# Patient Record
Sex: MALE | Race: BLACK OR AFRICAN AMERICAN | Employment: OTHER | URBAN - METROPOLITAN AREA
[De-identification: names, ages, dates, MRNs, and addresses within clinical notes are randomized per-mention and may not be internally consistent; named-entity substitution may affect disease eponyms.]

---

## 2017-08-19 ENCOUNTER — HOSPITAL ENCOUNTER (INPATIENT)
Age: 82
LOS: 5 days | Discharge: HOME | DRG: 247 | End: 2017-08-24
Attending: EMERGENCY MEDICINE | Admitting: INTERNAL MEDICINE
Payer: MEDICARE

## 2017-08-19 ENCOUNTER — APPOINTMENT (OUTPATIENT)
Dept: GENERAL RADIOLOGY | Age: 82
DRG: 247 | End: 2017-08-19
Attending: NURSE PRACTITIONER
Payer: MEDICARE

## 2017-08-19 DIAGNOSIS — I10 ESSENTIAL HYPERTENSION: ICD-10-CM

## 2017-08-19 DIAGNOSIS — G47.33 OSA (OBSTRUCTIVE SLEEP APNEA): ICD-10-CM

## 2017-08-19 DIAGNOSIS — I20.0 UNSTABLE ANGINA (HCC): Primary | ICD-10-CM

## 2017-08-19 LAB
ALBUMIN SERPL-MCNC: 3.9 G/DL (ref 3.5–5)
ALBUMIN/GLOB SERPL: 1 {RATIO} (ref 1.1–2.2)
ALP SERPL-CCNC: 84 U/L (ref 45–117)
ALT SERPL-CCNC: 16 U/L (ref 12–78)
ANION GAP SERPL CALC-SCNC: 5 MMOL/L (ref 5–15)
AST SERPL-CCNC: 11 U/L (ref 15–37)
ATRIAL RATE: 69 BPM
BASOPHILS # BLD: 0 K/UL (ref 0–0.1)
BASOPHILS NFR BLD: 0 % (ref 0–1)
BILIRUB SERPL-MCNC: 0.4 MG/DL (ref 0.2–1)
BUN SERPL-MCNC: 23 MG/DL (ref 6–20)
BUN/CREAT SERPL: 18 (ref 12–20)
CALCIUM SERPL-MCNC: 9.6 MG/DL (ref 8.5–10.1)
CALCULATED P AXIS, ECG09: 63 DEGREES
CALCULATED R AXIS, ECG10: 51 DEGREES
CALCULATED T AXIS, ECG11: 115 DEGREES
CHLORIDE SERPL-SCNC: 109 MMOL/L (ref 97–108)
CK SERPL-CCNC: 118 U/L (ref 39–308)
CO2 SERPL-SCNC: 28 MMOL/L (ref 21–32)
CREAT SERPL-MCNC: 1.26 MG/DL (ref 0.7–1.3)
DIAGNOSIS, 93000: NORMAL
EOSINOPHIL # BLD: 0.1 K/UL (ref 0–0.4)
EOSINOPHIL NFR BLD: 3 % (ref 0–7)
ERYTHROCYTE [DISTWIDTH] IN BLOOD BY AUTOMATED COUNT: 15.9 % (ref 11.5–14.5)
GLOBULIN SER CALC-MCNC: 4 G/DL (ref 2–4)
GLUCOSE SERPL-MCNC: 99 MG/DL (ref 65–100)
HCT VFR BLD AUTO: 40.3 % (ref 36.6–50.3)
HGB BLD-MCNC: 13.1 G/DL (ref 12.1–17)
LYMPHOCYTES # BLD: 2 K/UL (ref 0.8–3.5)
LYMPHOCYTES NFR BLD: 43 % (ref 12–49)
MCH RBC QN AUTO: 26.5 PG (ref 26–34)
MCHC RBC AUTO-ENTMCNC: 32.5 G/DL (ref 30–36.5)
MCV RBC AUTO: 81.4 FL (ref 80–99)
MONOCYTES # BLD: 0.4 K/UL (ref 0–1)
MONOCYTES NFR BLD: 9 % (ref 5–13)
NEUTS SEG # BLD: 2.1 K/UL (ref 1.8–8)
NEUTS SEG NFR BLD: 45 % (ref 32–75)
P-R INTERVAL, ECG05: 156 MS
PLATELET # BLD AUTO: 185 K/UL (ref 150–400)
POTASSIUM SERPL-SCNC: 4.1 MMOL/L (ref 3.5–5.1)
PROT SERPL-MCNC: 7.9 G/DL (ref 6.4–8.2)
Q-T INTERVAL, ECG07: 406 MS
QRS DURATION, ECG06: 82 MS
QTC CALCULATION (BEZET), ECG08: 435 MS
RBC # BLD AUTO: 4.95 M/UL (ref 4.1–5.7)
SODIUM SERPL-SCNC: 142 MMOL/L (ref 136–145)
TROPONIN I SERPL-MCNC: <0.04 NG/ML
TROPONIN I SERPL-MCNC: <0.04 NG/ML
VENTRICULAR RATE, ECG03: 69 BPM
WBC # BLD AUTO: 4.7 K/UL (ref 4.1–11.1)

## 2017-08-19 PROCEDURE — 74011250636 HC RX REV CODE- 250/636: Performed by: INTERNAL MEDICINE

## 2017-08-19 PROCEDURE — 65660000000 HC RM CCU STEPDOWN

## 2017-08-19 PROCEDURE — 99284 EMERGENCY DEPT VISIT MOD MDM: CPT

## 2017-08-19 PROCEDURE — 85025 COMPLETE CBC W/AUTO DIFF WBC: CPT | Performed by: NURSE PRACTITIONER

## 2017-08-19 PROCEDURE — 80053 COMPREHEN METABOLIC PANEL: CPT | Performed by: NURSE PRACTITIONER

## 2017-08-19 PROCEDURE — 36415 COLL VENOUS BLD VENIPUNCTURE: CPT | Performed by: NURSE PRACTITIONER

## 2017-08-19 PROCEDURE — 82550 ASSAY OF CK (CPK): CPT | Performed by: NURSE PRACTITIONER

## 2017-08-19 PROCEDURE — 84484 ASSAY OF TROPONIN QUANT: CPT | Performed by: NURSE PRACTITIONER

## 2017-08-19 PROCEDURE — 93005 ELECTROCARDIOGRAM TRACING: CPT

## 2017-08-19 PROCEDURE — 71020 XR CHEST PA LAT: CPT

## 2017-08-19 PROCEDURE — 74011250637 HC RX REV CODE- 250/637: Performed by: INTERNAL MEDICINE

## 2017-08-19 RX ORDER — SODIUM CHLORIDE 0.9 % (FLUSH) 0.9 %
5-10 SYRINGE (ML) INJECTION EVERY 8 HOURS
Status: DISCONTINUED | OUTPATIENT
Start: 2017-08-19 | End: 2017-08-24 | Stop reason: HOSPADM

## 2017-08-19 RX ORDER — LOSARTAN POTASSIUM 50 MG/1
100 TABLET ORAL DAILY
Status: CANCELLED | OUTPATIENT
Start: 2017-08-20

## 2017-08-19 RX ORDER — ACETAMINOPHEN 325 MG/1
650 TABLET ORAL
Status: DISCONTINUED | OUTPATIENT
Start: 2017-08-19 | End: 2017-08-24 | Stop reason: HOSPADM

## 2017-08-19 RX ORDER — POTASSIUM CHLORIDE 750 MG/1
20 TABLET, FILM COATED, EXTENDED RELEASE ORAL EVERY EVENING
Status: DISCONTINUED | OUTPATIENT
Start: 2017-08-19 | End: 2017-08-24 | Stop reason: HOSPADM

## 2017-08-19 RX ORDER — VITAMIN E 1000 UNIT
1000 CAPSULE ORAL DAILY
COMMUNITY
End: 2019-03-04

## 2017-08-19 RX ORDER — TRIAMCINOLONE ACETONIDE 1 MG/G
CREAM TOPICAL DAILY
Status: CANCELLED | OUTPATIENT
Start: 2017-08-20

## 2017-08-19 RX ORDER — HYDROCODONE BITARTRATE AND ACETAMINOPHEN 5; 325 MG/1; MG/1
1 TABLET ORAL
Status: CANCELLED | OUTPATIENT
Start: 2017-08-19

## 2017-08-19 RX ORDER — ERGOCALCIFEROL 1.25 MG/1
50000 CAPSULE ORAL
Status: CANCELLED | OUTPATIENT
Start: 2017-08-19

## 2017-08-19 RX ORDER — PRAVASTATIN SODIUM 20 MG/1
20 TABLET ORAL
Status: DISCONTINUED | OUTPATIENT
Start: 2017-08-19 | End: 2017-08-24 | Stop reason: HOSPADM

## 2017-08-19 RX ORDER — BICALUTAMIDE 50 MG/1
50 TABLET, FILM COATED ORAL DAILY
Status: DISCONTINUED | OUTPATIENT
Start: 2017-08-20 | End: 2017-08-24 | Stop reason: HOSPADM

## 2017-08-19 RX ORDER — METHOCARBAMOL 750 MG/1
750 TABLET, FILM COATED ORAL
Status: ON HOLD | COMMUNITY
End: 2019-05-24

## 2017-08-19 RX ORDER — NITROGLYCERIN 0.4 MG/1
0.4 TABLET SUBLINGUAL
Status: DISCONTINUED | OUTPATIENT
Start: 2017-08-19 | End: 2017-08-24 | Stop reason: HOSPADM

## 2017-08-19 RX ORDER — SODIUM CHLORIDE 0.9 % (FLUSH) 0.9 %
5-10 SYRINGE (ML) INJECTION AS NEEDED
Status: DISCONTINUED | OUTPATIENT
Start: 2017-08-19 | End: 2017-08-24 | Stop reason: HOSPADM

## 2017-08-19 RX ORDER — METFORMIN HYDROCHLORIDE 500 MG/1
500 TABLET ORAL 2 TIMES DAILY WITH MEALS
Status: DISCONTINUED | OUTPATIENT
Start: 2017-08-19 | End: 2017-08-20

## 2017-08-19 RX ORDER — LISINOPRIL 20 MG/1
40 TABLET ORAL DAILY
Status: DISCONTINUED | OUTPATIENT
Start: 2017-08-19 | End: 2017-08-24 | Stop reason: HOSPADM

## 2017-08-19 RX ORDER — EZETIMIBE 10 MG/1
10 TABLET ORAL DAILY
Status: CANCELLED | OUTPATIENT
Start: 2017-08-20

## 2017-08-19 RX ORDER — METOPROLOL TARTRATE 25 MG/1
25 TABLET, FILM COATED ORAL DAILY
Status: ON HOLD | COMMUNITY
End: 2017-08-24

## 2017-08-19 RX ORDER — LORATADINE 10 MG/1
10 TABLET ORAL
Status: DISCONTINUED | OUTPATIENT
Start: 2017-08-19 | End: 2017-08-24 | Stop reason: HOSPADM

## 2017-08-19 RX ORDER — FUROSEMIDE 40 MG/1
40 TABLET ORAL DAILY
Status: DISCONTINUED | OUTPATIENT
Start: 2017-08-19 | End: 2017-08-23

## 2017-08-19 RX ORDER — DOCUSATE SODIUM 100 MG/1
100 CAPSULE, LIQUID FILLED ORAL
COMMUNITY

## 2017-08-19 RX ORDER — METOPROLOL TARTRATE 50 MG/1
50 TABLET ORAL 2 TIMES DAILY
Status: DISCONTINUED | OUTPATIENT
Start: 2017-08-19 | End: 2017-08-21

## 2017-08-19 RX ORDER — NITROGLYCERIN 0.4 MG/1
0.4 TABLET SUBLINGUAL
Status: CANCELLED | OUTPATIENT
Start: 2017-08-19

## 2017-08-19 RX ORDER — AMLODIPINE BESYLATE 5 MG/1
10 TABLET ORAL DAILY
Status: DISCONTINUED | OUTPATIENT
Start: 2017-08-19 | End: 2017-08-24 | Stop reason: HOSPADM

## 2017-08-19 RX ORDER — METHOCARBAMOL 750 MG/1
750 TABLET, FILM COATED ORAL
Status: DISCONTINUED | OUTPATIENT
Start: 2017-08-19 | End: 2017-08-24 | Stop reason: HOSPADM

## 2017-08-19 RX ORDER — HYDROCODONE BITARTRATE AND ACETAMINOPHEN 5; 325 MG/1; MG/1
1 TABLET ORAL
COMMUNITY
End: 2017-08-19

## 2017-08-19 RX ORDER — ASCORBIC ACID 500 MG
1000 TABLET ORAL DAILY
Status: DISCONTINUED | OUTPATIENT
Start: 2017-08-20 | End: 2017-08-24 | Stop reason: HOSPADM

## 2017-08-19 RX ORDER — TRIAMCINOLONE ACETONIDE 1 MG/G
CREAM TOPICAL 2 TIMES DAILY
Status: DISCONTINUED | OUTPATIENT
Start: 2017-08-19 | End: 2017-08-24 | Stop reason: HOSPADM

## 2017-08-19 RX ORDER — DOCUSATE SODIUM 100 MG/1
100 CAPSULE, LIQUID FILLED ORAL
Status: DISCONTINUED | OUTPATIENT
Start: 2017-08-19 | End: 2017-08-24 | Stop reason: HOSPADM

## 2017-08-19 RX ORDER — ENOXAPARIN SODIUM 100 MG/ML
40 INJECTION SUBCUTANEOUS EVERY 24 HOURS
Status: DISCONTINUED | OUTPATIENT
Start: 2017-08-19 | End: 2017-08-21

## 2017-08-19 RX ORDER — ASPIRIN 81 MG/1
81 TABLET ORAL DAILY
Status: DISCONTINUED | OUTPATIENT
Start: 2017-08-20 | End: 2017-08-24 | Stop reason: HOSPADM

## 2017-08-19 RX ORDER — FLUTICASONE PROPIONATE 50 MCG
2 SPRAY, SUSPENSION (ML) NASAL
COMMUNITY
End: 2019-03-04 | Stop reason: ALTCHOICE

## 2017-08-19 RX ADMIN — Medication 10 ML: at 21:27

## 2017-08-19 RX ADMIN — ENOXAPARIN SODIUM 40 MG: 40 INJECTION SUBCUTANEOUS at 17:11

## 2017-08-19 RX ADMIN — PRAVASTATIN SODIUM 20 MG: 20 TABLET ORAL at 21:25

## 2017-08-19 RX ADMIN — METOPROLOL TARTRATE 50 MG: 50 TABLET ORAL at 15:49

## 2017-08-19 RX ADMIN — METHOCARBAMOL 750 MG: 750 TABLET ORAL at 21:25

## 2017-08-19 RX ADMIN — METFORMIN HYDROCHLORIDE 500 MG: 500 TABLET, FILM COATED ORAL at 17:12

## 2017-08-19 RX ADMIN — ACETAMINOPHEN 650 MG: 325 TABLET, FILM COATED ORAL at 17:11

## 2017-08-19 RX ADMIN — LISINOPRIL 40 MG: 20 TABLET ORAL at 15:49

## 2017-08-19 RX ADMIN — DOCUSATE SODIUM 100 MG: 100 CAPSULE, LIQUID FILLED ORAL at 21:25

## 2017-08-19 RX ADMIN — AMLODIPINE BESYLATE 10 MG: 5 TABLET ORAL at 15:48

## 2017-08-19 RX ADMIN — NITROGLYCERIN 1 INCH: 20 OINTMENT TOPICAL at 15:29

## 2017-08-19 RX ADMIN — POTASSIUM CHLORIDE 20 MEQ: 750 TABLET, FILM COATED, EXTENDED RELEASE ORAL at 17:12

## 2017-08-19 RX ADMIN — FUROSEMIDE 40 MG: 40 TABLET ORAL at 15:49

## 2017-08-19 RX ADMIN — Medication 10 ML: at 18:22

## 2017-08-19 NOTE — PROGRESS NOTES
Problem: Falls - Risk of  Goal: *Absence of Falls  Document J Luis Fall Risk and appropriate interventions in the flowsheet.    Outcome: Progressing Towards Goal  Fall Risk Interventions:     Call bell within reach  Bed in lowest position

## 2017-08-19 NOTE — ED NOTES
ED EKG interpretation:  Rhythm: normal sinus rhythm; and regular . Rate (approx.): 69; ST/T wave: T wave inverted in V5 and V6; No changes from June 2015.   Note written by Neris Mejia, as dictated by Price Day MD 11:19 AM    Alma Pandya NP  3:21 PM

## 2017-08-19 NOTE — PROGRESS NOTES
TRANSFER - IN REPORT:    Verbal report [facility] from [facility]name[facility]  being received from ED(unit) for routine progression of care      Report consisted of patients Situation, Background, Assessment and   Recommendations(SBAR). Information from the following report(s) SBAR, Kardex, [facility] and Recent Results was reviewed with the receiving nurse. Opportunity for questions and clarification was provided. Assessment completed upon patients arrival to unit and care assumed.

## 2017-08-19 NOTE — ED TRIAGE NOTES
Pt states that at 2 am this morning he woke with a sharp 10/10 midsternal chest pain which lasts about a minute at a time. Pt states that the pain is increased with inspiration and says he nauseated with a headache. Denies SOB and has been pain free.  Pt states that he took 324 mg of ASA at 9 am.

## 2017-08-19 NOTE — PROGRESS NOTES
Prior to Admission Medications   Prescriptions Last Dose Informant Patient Reported? Taking? HYDROcodone-acetaminophen (NORCO) 5-325 mg per tablet Not Taking at Unknown time  Yes No   Sig: Take 1 Tab by mouth every six (6) hours as needed for Pain. amLODIPine (NORVASC) 10 mg tablet 8/18/2017 at 0900  Yes Yes   Sig: Take 10 mg by mouth daily. ascorbic acid, vitamin C, (VITAMIN C) 1,000 mg tablet 8/18/2017 at 0900  Yes Yes   Sig: Take 1,000 mg by mouth daily. aspirin delayed-release 81 mg tablet 8/19/2017 at 0800  No Yes   Sig: Take 1 Tab by mouth daily. Patient taking differently: Take 81 mg by mouth two (2) times a day. betamethasone dipropionate (DIPROSONE) 0.05 % topical cream 8/12/2017 at Unknown time  Yes Yes   Sig: Apply  to affected area two (2) times a day. bicalutamide (CASODEX) 50 mg tablet 8/18/2017 at 0900  Yes Yes   Sig: Take 50 mg by mouth daily. docusate sodium (DULCOLAX STOOL SOFTENER, DSS,) 100 mg capsule 8/18/2017 at 2100  No Yes   Sig: Take 1 Cap by mouth two (2) times a day. Patient taking differently: Take 100 mg by mouth nightly.   ergocalciferol (VITAMIN D2) 50,000 unit capsule Not Taking at Unknown time  Yes No   Sig: Take 50,000 Units by mouth every thirty (30) days. ezetimibe (ZETIA) 10 mg tablet 8/18/2017 at 0900  Yes Yes   Sig: Take 10 mg by mouth daily. furosemide (LASIX) 40 mg tablet 8/18/2017 at 0900  Yes Yes   Sig: Take 40 mg by mouth daily. lisinopril (PRINIVIL, ZESTRIL) 40 mg tablet 8/18/2017 at 0900  Yes Yes   Sig: Take 40 mg by mouth daily. loratadine (CLARITIN) 10 mg tablet 8/18/2017 at 0900  Yes Yes   Sig: Take 10 mg by mouth nightly as needed for Allergies. losartan (COZAAR) 100 mg tablet 8/18/2017 at 2100  Yes Yes   Sig: Take 100 mg by mouth daily. metFORMIN (GLUCOPHAGE) 500 mg tablet 8/18/2017 at 1800  Yes Yes   Sig: Take 500 mg by mouth two (2) times daily (with meals).    methocarbamol (ROBAXIN) 750 mg tablet 8/18/2017 at 2100  Yes Yes   Sig: Take 750 mg by mouth nightly. metoprolol tartrate (LOPRESSOR) 25 mg tablet 8/18/2017 at 0900  Yes Yes   Sig: Take 25 mg by mouth daily. nitroglycerin (NITROSTAT) 0.4 mg SL tablet Not Taking at Unknown time  Yes No   Sig: by SubLINGual route every five (5) minutes as needed for Chest Pain (ntg 0.4 q 5 min. x 3 doses PRN chest pain  call MD or 911 if no relief). potassium chloride (K-DUR, KLOR-CON) 20 mEq tablet 8/18/2017 at 2100  Yes Yes   Sig: Take 20 mEq by mouth every evening. pravastatin (PRAVACHOL) 20 mg tablet 8/18/2017 at 2100  Yes Yes   Sig: Take 20 mg by mouth nightly. triamcinolone acetonide (KENALOG) 0.1 % topical cream 8/12/2017 at Unknown time  Yes Yes   Sig: Apply  to affected area daily. use thin layer      Facility-Administered Medications: None   Self:List updated per patient interview and some bottles brought in.  Awaiting wife to verify lorsartan and zetia

## 2017-08-19 NOTE — PROGRESS NOTES
Admission Medication Reconciliation:    Information obtained from: patient    Significant PMH/Disease States:   Past Medical History:   Diagnosis Date    Cancer [facility])     Prostate CA    [facility]     Hypertension     Myocardial infarct ([facility]     Sleep disorder     Sleep Apnea C-PAP use at home        Chief Complaint for this Admission:  No chief complaint on file. Allergies:  Iodine    Prior to Admission Medications:   Prior to Admission Medications   Prescriptions Last Dose Informant Patient Reported? Taking? VITAMIN E/QUININE SULFATE (LEG CRAMP TREATMENT PO) 2017  Yes Yes   Sig: Take 1 Cap by mouth nightly. Filiberto's leg cramps   amLODIPine (NORVASC) 10 mg tablet 2017 at 0900  Yes Yes   Sig: Take 10 mg by mouth daily. ascorbic acid, vitamin C, (VITAMIN C) 1,000 mg tablet 2017 at 0900  Yes Yes   Sig: Take 1,000 mg by mouth daily. aspirin delayed-release 81 mg tablet 2017 at 0800  No Yes   Sig: Take 1 Tab by mouth daily. betamethasone dipropionate (DIPROSONE) 0.05 % topical cream 2017  Yes Yes   Sig: Apply  to affected area two (2) times a day. bicalutamide (CASODEX) 50 mg tablet 2017 at 0900  Yes Yes   Sig: Take 50 mg by mouth daily. docusate sodium (COLACE) 100 mg capsule 2017  Yes Yes   Sig: Take 100 mg by mouth nightly as needed for Constipation. fluticasone (FLONASE) 50 mcg/actuation nasal spray 2017  Yes Yes   Si Sprays by Both Nostrils route nightly. furosemide (LASIX) 40 mg tablet 2017 at 0900  Yes Yes   Sig: Take 40 mg by mouth daily. lisinopril (PRINIVIL, ZESTRIL) 40 mg tablet 2017 at 0900  Yes Yes   Sig: Take 40 mg by mouth daily. loratadine (CLARITIN) 10 mg tablet 2017 at 0900  Yes Yes   Sig: Take 10 mg by mouth nightly as needed for Allergies. metFORMIN (GLUCOPHAGE) 500 mg tablet 2017 at 1800  Yes Yes   Sig: Take 500 mg by mouth two (2) times daily (with meals).    methocarbamol (ROBAXIN) 750 mg tablet 8/18/2017 at 2100  Yes Yes   Sig: Take 750 mg by mouth nightly. metoprolol tartrate (LOPRESSOR) 25 mg tablet 8/18/2017 at 0900  Yes Yes   Sig: Take 25 mg by mouth daily. potassium chloride (K-DUR, KLOR-CON) 20 mEq tablet 8/18/2017 at 2100  Yes Yes   Sig: Take 20 mEq by mouth every evening. pravastatin (PRAVACHOL) 20 mg tablet 8/18/2017 at 2100  Yes Yes   Sig: Take 20 mg by mouth nightly. Facility-Administered Medications: None         Comments/Recommendations: This medication history was obtained from patient and [address] [place] [address]  ([phone]); (s)he appears to be a good historian. An RX Query is available. Medications added: david's leg cramps, flonase  Medications deleted: vitD, zetia, losartan, nitrostat, triamcinolone  Medications amended: colace is nightly    Last doses of medication: yesterday. Patient only took 4 x aspirin this am  Review of Allergies: completed    Also of note: patient says he does not take the casodex and furosemide on sundays (reason is because he doesn't want to have to urinate during Taoism)    Thank you for allowing me to participate in the care of this patient. Please contact the pharmacy () or the medication reconciliation pharmacy () with any questions.     Chepe Dyer, MendezD

## 2017-08-19 NOTE — ED PROVIDER NOTES
HPI Comments: The pt is an 80year old male who presents with complaints of intermittent chest pain since 0200. Episodes last one minute and are exacerbated with deep breathing. Pt denies fevers, chills, night sweats, SOB, VALLADARES, PND, orthopnea, abdominal pain, n/v/d, melena, hematuria, dysuria, constipation, HA, dizziness, and syncope. He did take 325 mg of ASA this am.     Past Medical History:  No date: Cancer ([facility]      Comment: Prostate CA  No date: Diabetes ([facility]  No date: Hypertension  No date: Myocardial infarct ([facility]  No date: Sleep disorder      Comment: Sleep Apnea C-PAP use at home     Past Surgical History:  No date: CARDIAC SURG PROCEDURE UNLIST      Comment: CABG  2012: HX COLECTOMY  No date: HX CORONARY STENT PLACEMENT  No date: HX PROSTATECTOMY    PCP:  Nafisa Christiansen MD        Patient is a 80 y.o. male presenting with chest pain. The history is provided by the patient. Chest Pain (Angina)    This is a new problem. Episode onset: eight hours  The problem has not changed since onset. Duration of episode(s) is 1 minute. The problem occurs hourly. The pain is associated with normal activity. The pain is present in the substernal region. The quality of the pain is described as sharp. The pain does not radiate. Associated symptoms include headaches and nausea. Pertinent negatives include no abdominal pain, no back pain, no claudication, no cough, no diaphoresis, no dizziness, no exertional chest pressure, no fever, no hemoptysis, no irregular heartbeat, no leg pain, no lower extremity edema, no malaise/fatigue, no near-syncope, no numbness, no orthopnea, no palpitations, no PND, no shortness of breath, no sputum production, no vomiting and no weakness. He has tried aspirin for the symptoms. The treatment provided no relief. Risk factors include diabetes mellitus, obesity, a sendentary lifestyle, hypertension, male gender and cardiac disease.  Procedural history includes cardiac catheterization, stress echo and CABG. Past workup comments: most recent stress test in 2015. Past Medical History:   Diagnosis Date    Cancer [facility])     Prostate CA    [facility]     Hypertension     Myocardial infarct ([facility]     Sleep disorder     Sleep Apnea C-PAP use at home        Past Surgical History:   Procedure Laterality Date    CARDIAC SURG PROCEDURE UNLIST      CABG    HX COLECTOMY  2012    HX CORONARY STENT PLACEMENT      HX PROSTATECTOMY           History reviewed. No pertinent family history. Social History     Social History    Marital status: SINGLE     Spouse name: N/A    Number of children: N/A    Years of education: N/A     Occupational History    Not on file. Social History Main Topics    Smoking status: Never Smoker    Smokeless tobacco: Never Used    Alcohol use No    Drug use: Yes    Sexual activity: Not on file     Other Topics Concern    Not on file     Social History Narrative         ALLERGIES: Iodine    Review of Systems   Constitutional: Negative for activity change, appetite change, chills, diaphoresis, fatigue, fever, malaise/fatigue and unexpected weight change. HENT: Negative for congestion, ear pain, rhinorrhea, sinus pressure, sore throat, tinnitus, trouble swallowing and voice change. Eyes: Negative for pain, discharge, redness and visual disturbance. Respiratory: Negative for apnea, cough, hemoptysis, sputum production, choking, chest tightness, shortness of breath, wheezing and stridor. Cardiovascular: Positive for chest pain. Negative for palpitations, orthopnea, claudication, leg swelling, PND and near-syncope. Gastrointestinal: Positive for nausea. Negative for abdominal pain, constipation and vomiting. Endocrine: Negative for cold intolerance and heat intolerance. Genitourinary: Negative for difficulty urinating, dysuria, flank pain, hematuria, testicular pain and urgency.    Musculoskeletal: Negative for arthralgias, back pain, gait problem, joint swelling, myalgias, neck pain and neck stiffness. Skin: Negative for color change, pallor, rash and wound. Allergic/Immunologic: Negative for immunocompromised state. Neurological: Positive for headaches. Negative for dizziness, tremors, syncope, weakness, light-headedness and numbness. Hematological: Does not bruise/bleed easily. Psychiatric/Behavioral: Negative for agitation, confusion and suicidal ideas. Vitals:    08/19/17 1006   BP: (!) 183/91   Pulse: 71   Resp: 17   SpO2: 95%   Weight: 125.2 kg (276 lb)   Height: 5' 6\" (1.676 m)            Physical Exam   Constitutional: He is oriented to person, place, and time. He appears well-developed and well-nourished. No distress. HENT:   Head: Atraumatic. Nose: Nose normal.   Mouth/Throat: No oropharyngeal exudate. Eyes: Conjunctivae and EOM are normal. Right eye exhibits no discharge. Left eye exhibits no discharge. No scleral icterus. Neck: Normal range of motion. Neck supple. No JVD present. No tracheal deviation present. No thyromegaly present. Cardiovascular: Normal rate, regular rhythm, normal heart sounds, intact distal pulses and normal pulses. PMI is not displaced. Exam reveals no gallop and no friction rub. No murmur heard. Pulmonary/Chest: Breath sounds normal. No accessory muscle usage or stridor. No respiratory distress. He has no decreased breath sounds. He has no wheezes. He has no rhonchi. He has no rales. He exhibits no tenderness. Abdominal: Soft. Bowel sounds are normal. He exhibits no distension and no mass. There is no hepatosplenomegaly. There is no tenderness. There is no rigidity, no rebound, no guarding, no CVA tenderness, no tenderness at McBurney's point and negative Ferguson's sign. Musculoskeletal: Normal range of motion. He exhibits no edema or tenderness. Lymphadenopathy:     He has no cervical adenopathy. Neurological: He is alert and oriented to person, place, and time. He has normal strength.  No cranial nerve deficit or sensory deficit. GCS eye subscore is 4. GCS verbal subscore is 5. GCS motor subscore is 6. Skin: Skin is warm and dry. He is not diaphoretic. Psychiatric: He has a normal mood and affect. His behavior is normal.   Nursing note and vitals reviewed. MDM  Number of Diagnoses or Management Options  Diagnosis management comments:    * routine laboratory data    * EKG and CXR    * Consult to Cards        Amount and/or Complexity of Data Reviewed  Clinical lab tests: ordered and reviewed  Tests in the radiology section of CPT®: ordered and reviewed  Discussion of test results with the performing providers: yes  Review and summarize past medical records: yes  Discuss the patient with other providers: yes    Risk of Complications, Morbidity, and/or Mortality  General comments:    - hemodynamically stable pt in NAD    Patient Progress  Patient progress: stable    ED Course       Procedures         CONSULT NOTE:   3:20 PM  Rebeka Tyler NP spoke with Dr. Kasia Villaseñor MD,   Specialty: Cardiology  Discussed pt's hx, disposition, and available diagnostic and imaging results. Reviewed care plans. Consultant agrees with plans as outlined. Admit to inpatient. Rebeka Tyler NP      3:20 PM  Patient is being admitted to the hospital.  The results of their tests and reasons for their admission have been discussed with them and/or available family. They convey agreement and understanding for the need to be admitted and for their admission diagnosis. Consultation has been made with the inpatient physician specialist for hospitalization.     LABORATORY TESTS:  Recent Results (from the past 12 hour(s))   EKG, 12 LEAD, INITIAL    Collection Time: 08/19/17 10:05 AM   Result Value Ref Range    Ventricular Rate 69 BPM    Atrial Rate 69 BPM    P-R Interval 156 ms    QRS Duration 82 ms    Q-T Interval 406 ms    QTC Calculation (Bezet) 435 ms    Calculated P Axis 63 degrees    Calculated R Axis 51 degrees    Calculated T Axis 115 degrees    Diagnosis       Normal sinus rhythm  T wave abnormality, consider anterolateral ischemia  When compared with ECG of 14-JUL-2015 05:11,  No significant change was found     CBC WITH AUTOMATED DIFF    Collection Time: 08/19/17 10:15 AM   Result Value Ref Range    WBC 4.7 4.1 - 11.1 K/uL    RBC 4.95 4.10 - 5.70 M/uL    HGB 13.1 12.1 - 17.0 g/dL    HCT 40.3 36.6 - 50.3 %    MCV 81.4 80.0 - 99.0 FL    MCH 26.5 26.0 - 34.0 PG    MCHC 32.5 30.0 - 36.5 g/dL    RDW 15.9 (H) 11.5 - 14.5 %    PLATELET 230 567 - 571 K/uL    NEUTROPHILS 45 32 - 75 %    LYMPHOCYTES 43 12 - 49 %    MONOCYTES 9 5 - 13 %    EOSINOPHILS 3 0 - 7 %    BASOPHILS 0 0 - 1 %    ABS. NEUTROPHILS 2.1 1.8 - 8.0 K/UL    ABS. LYMPHOCYTES 2.0 0.8 - 3.5 K/UL    ABS. MONOCYTES 0.4 0.0 - 1.0 K/UL    ABS. EOSINOPHILS 0.1 0.0 - 0.4 K/UL    ABS. BASOPHILS 0.0 0.0 - 0.1 K/UL   METABOLIC PANEL, COMPREHENSIVE    Collection Time: 08/19/17 10:15 AM   Result Value Ref Range    Sodium 142 136 - 145 mmol/L    Potassium 4.1 3.5 - 5.1 mmol/L    Chloride 109 (H) 97 - 108 mmol/L    CO2 28 21 - 32 mmol/L    Anion gap 5 5 - 15 mmol/L    Glucose 99 65 - 100 mg/dL    BUN 23 (H) 6 - 20 MG/DL    Creatinine 1.26 0.70 - 1.30 MG/DL    BUN/Creatinine ratio 18 12 - 20      GFR est AA >60 >60 ml/min/1.73m2    GFR est non-AA 54 (L) >60 ml/min/1.73m2    Calcium 9.6 8.5 - 10.1 MG/DL    Bilirubin, total 0.4 0.2 - 1.0 MG/DL    ALT (SGPT) 16 12 - 78 U/L    AST (SGOT) 11 (L) 15 - 37 U/L    Alk.  phosphatase 84 45 - 117 U/L    Protein, total 7.9 6.4 - 8.2 g/dL    Albumin 3.9 3.5 - 5.0 g/dL    Globulin 4.0 2.0 - 4.0 g/dL    A-G Ratio 1.0 (L) 1.1 - 2.2     CK W/ REFLX CKMB    Collection Time: 08/19/17 10:15 AM   Result Value Ref Range     39 - 308 U/L   TROPONIN I    Collection Time: 08/19/17 10:15 AM   Result Value Ref Range    Troponin-I, Qt. <0.04 <0.05 ng/mL   TROPONIN I    Collection Time: 08/19/17 12:35 PM   Result Value Ref Range    Troponin-I, Qt. <0.04 <0.05 ng/mL       IMAGING RESULTS:  XR CHEST PA LAT   Final Result        Xr Chest Pa Lat    Result Date: 8/19/2017  INDICATION:   chest pain  midsternal, 10 out of 10, increases with inspiration, nausea and headache COMPARISON: 7/13/15 FINDINGS: Frontal and lateral views of the chest demonstrate cardiomegaly and prior median sternotomy. The lungs are bibasilar atelectasis or scarring. There is no edema, effusion, consolidation, or pneumothorax. The osseous structures are unremarkable. IMPRESSION: No acute process. MEDICATIONS GIVEN:  Medications   amLODIPine (NORVASC) tablet 10 mg (not administered)   furosemide (LASIX) tablet 40 mg (not administered)   lisinopril (PRINIVIL, ZESTRIL) tablet 40 mg (not administered)   nitroglycerin (NITROBID) 2 % ointment 1 Inch (not administered)   metoprolol tartrate (LOPRESSOR) tablet 50 mg (not administered)       IMPRESSION:  1. Unstable angina ([address]    2. Essential hypertension    3. NAOMIE (obstructive sleep apnea)        PLAN:  1.  Admit to Cardiology       Lilia Sims NP  3:21 PM

## 2017-08-19 NOTE — ROUTINE PROCESS
TRANSFER - OUT REPORT:    Verbal report given to MIRANDA Norton(name) on Mata Chacon  being transferred to [facility]) for routine progression of care       Report consisted of patients Situation, Background, Assessment and   Recommendations(SBAR). Information from the following report(s) SBAR and Kardex was reviewed with the receiving nurse. Lines:   Peripheral IV 08/19/17 Left Antecubital (Active)   Site Assessment Clean, dry, & intact 8/19/2017 10:19 AM   Phlebitis Assessment 0 8/19/2017 10:19 AM   Infiltration Assessment 0 8/19/2017 10:19 AM   Dressing Status Clean, dry, & intact 8/19/2017 10:19 AM   Dressing Type Tape;Transparent 8/19/2017 10:19 AM   Hub Color/Line Status Pink;Flushed;Patent 8/19/2017 10:19 AM   Action Taken Blood drawn 8/19/2017 10:19 AM        Opportunity for questions and clarification was provided.       Patient transported with:   [facility]

## 2017-08-19 NOTE — IP AVS SNAPSHOT
[address] 
[phone] Patient: Dez Parker MRN: [id number] VPK:[id number] You are allergic to the following Allergen Reactions Iodine Other (comments) \"it gives me a headache\" Recent Documentation Height Weight BMI Smoking Status 1.676 m 122.4 kg 43.55 kg/m2 Never Smoker Emergency Contacts Name Discharge Info Relation Home Work Mobile Janelle Gonsales  Child [2] [phone] About your hospitalization You were admitted on:  August 19, 2017 You last received care in the:  [facility] 4 IMCU 2 You were discharged on:  August 24, 2017 Unit phone number:  [phone] Why you were hospitalized Your primary diagnosis was:  Not on File Your diagnoses also included:  Unstable Angina (Hcc) Providers Seen During Your Hospitalizations Provider Role Specialty Primary office phone Stanford Willard MD Attending Provider Emergency Medicine [phone] Peter Flowers MD Attending Provider Cardiology [phone] Dheeraj Clemons MD Attending Provider Cardiology [phone] Your Primary Care Physician (PCP) Primary Care Physician Office Phone Office Fax [place], [address] [place] [address] [phone] Follow-up Information Follow up With Details Comments Contact Info Additional Information [facility] CARDIAC WELLNESS  appointmennt for Monday 9/11/2017 at 1:00pm. bring medication list and insurannce cards. this is a two hour appointment . wear comfortable clothing [phone] [facility] 54 
[phone] Please arrive 15 minutes prior to your appointment time and you will register in the [facility] 100, [address], on the first floor of the [address] [place] [address] Road. Telephone: [phone] Fax: [phone] Driving directions To [facility] and [facility] [place].  Building: Driving WEST on [address], take exit [address] to [place]. Turn left onto [facility], then turn right into [employer] Parking lot Driving EAST on [address], take [address] [facility]. Turn right at the end of the exit ramp. Turn left onto [facility], then turn right into [employer] lot. João Sosa MD  As needed [address] [address] 
[phone] Herrera Mendes MD On 9/13/2017 9/13/17 at 2pm for hospital follow up [address] [place] [address] [address] 
[phone] Your Appointments Monday September 11, 2017  1:00 PM EDT INTAKE with Kwabena Holley RN  
[facility] CARDIAC WELLNESS ([address]) [address]  
[phone] Please arrive 15 minutes prior to your appointment time and you will register in the [facility] 100, [address], on the first floor of the [address] [place] [address] Road. Telephone: [phone] Fax: [phone] Driving directions To [facility] and [facility] [place]. Building: Driving WEST on [address], take exit [address] to [place]. Turn left onto [facility], then turn right into [employer] Parking lot Driving EAST on [address], take exit [address] [facility]. Turn right at the end of the exit ramp. Turn left onto [facility], then turn right into [employer] lot. Current Discharge Medication List  
  
START taking these medications Dose & Instructions Dispensing Information Comments Morning Noon Evening Bedtime  
 clopidogrel 75 mg Tab Commonly known as:  PLAVIX Your last dose was: Your next dose is:    
   
   
 Dose:  75 mg Take 1 Tab by mouth daily. Indications: Thrombosis Prevention after PCI Quantity:  90 Tab Refills:  3 CONTINUE these medications which have CHANGED Dose & Instructions Dispensing Information Comments Morning Noon Evening Bedtime  
 metoprolol tartrate 25 mg tablet Commonly known as:  LOPRESSOR What changed:  how much to take Your last dose was: Your next dose is:    
   
   
 Dose:  12.5 mg Take 0.5 Tabs by mouth daily. Quantity:  30 Tab Refills:  3 CONTINUE these medications which have NOT CHANGED Dose & Instructions Dispensing Information Comments Morning Noon Evening Bedtime  
 amLODIPine 10 mg tablet Commonly known as:  Schuyler Cordial Your last dose was: Your next dose is:    
   
   
 Dose:  10 mg Take 10 mg by mouth daily. Refills:  0  
     
   
   
   
  
 aspirin delayed-release 81 mg tablet Your last dose was: Your next dose is:    
   
   
 Dose:  81 mg Take 1 Tab by mouth daily. Quantity:  100 Tab Refills:  4  
     
   
   
   
  
 betamethasone dipropionate 0.05 % topical cream  
Commonly known as:  Alroy Carbon Your last dose was: Your next dose is:    
   
   
 Apply  to affected area two (2) times a day. Refills:  0  
     
   
   
   
  
 bicalutamide 50 mg tablet Commonly known as:  CASODEX Your last dose was: Your next dose is:    
   
   
 Dose:  50 mg Take 50 mg by mouth daily. Refills:  0  
     
   
   
   
  
 COLACE 100 mg capsule Generic drug:  docusate sodium Your last dose was: Your next dose is:    
   
   
 Dose:  100 mg Take 100 mg by mouth nightly as needed for Constipation. Refills:  0  
     
   
   
   
  
 FLONASE 50 mcg/actuation nasal spray Generic drug:  fluticasone Your last dose was: Your next dose is:    
   
   
 Dose:  2 Spray 2 Sprays by Both Nostrils route nightly. Refills:  0 LEG CRAMP TREATMENT PO Your last dose was: Your next dose is:    
   
   
 Dose:  1 Cap Take 1 Cap by mouth nightly. Filiberto's leg cramps Refills:  0  
     
   
   
   
  
 lisinopril 40 mg tablet Commonly known as:  Willem Vlales Your last dose was: Your next dose is:    
   
   
 Dose:  40 mg Take 40 mg by mouth daily. Refills:  0  
     
   
   
   
  
 loratadine 10 mg tablet Commonly known as:  Deann Nett Your last dose was: Your next dose is:    
   
   
 Dose:  10 mg Take 10 mg by mouth nightly as needed for Allergies. Refills:  0  
     
   
   
   
  
 methocarbamol 750 mg tablet Commonly known as:  ROBAXIN Your last dose was: Your next dose is:    
   
   
 Dose:  750 mg Take 750 mg by mouth nightly. Refills:  0  
     
   
   
   
  
 pravastatin 20 mg tablet Commonly known as:  PRAVACHOL Your last dose was: Your next dose is:    
   
   
 Dose:  20 mg Take 20 mg by mouth nightly. Refills:  0  
     
   
   
   
  
 VITAMIN C 1,000 mg tablet Generic drug:  ascorbic acid (vitamin C) Your last dose was: Your next dose is:    
   
   
 Dose:  1000 mg Take 1,000 mg by mouth daily. Refills:  0 STOP taking these medications   
 furosemide 40 mg tablet Commonly known as:  LASIX  
   
  
 metFORMIN 500 mg tablet Commonly known as:  GLUCOPHAGE potassium chloride 20 mEq tablet Commonly known as:  K-DUR, KLOR-CON Where to Get Your Medications Information on where to get these meds will be given to you by the nurse or doctor. ! Ask your nurse or doctor about these medications  
  clopidogrel 75 mg Tab  
 metoprolol tartrate 25 mg tablet Discharge Instructions Patient Discharge Instructions Jair Reynolds / [id number] : 10/30/1930 Admitted 2017 Discharged: 2017 Take Home Medications · It is important that you take the medication exactly as they are prescribed. · Keep your medication in the bottles provided by the pharmacist and keep a list of the medication names, dosages, and times to be taken in your wallet. · Do not take other medications without consulting your doctor. · You must take aspirin and clopidogrel every day to keep your new stent from clotting · Do not take metformin until you see your PCP since you had a problem with your kidneys. You may need to take a different medication for your diabetes BRING ALL OF YOUR MEDICINES TO YOUR OFFICE VISIT with Dr. Tierra Carlos. Follow-up with Carolyn Martinez MD in 3 weeks. Call [phone] to arrange. Cardiac Catheterization  Discharge Instructions ? Do not drive, operate any machinery, or sign any legal documents for 24 hours after your procedure. You must have someone to drive you home. ? You may take a shower 24 hours after your cardiac catheterization. Be sure to get the dressing wet and then remove it; gently wash the area with warm soapy water. Pat dry and leave open to air. To help prevent infections, be sure to keep the cath site clean and dry. No lotions, creams, powders, ointments, etc. in the cath site for approximately 1 week. ? Do not take a tub bath, get in a hot tub or swimming pool for approximately 5 days or until the cath site is completely healed. ? No strenuous activity or heavy lifting over 10 lbs. for 7 days. ? Drink plenty of fluids for 24-48 hours after your cath to flush the contrast dye from your kidneys. No alcoholic beverages for 24 hours. You may resume your previous diet (low fat, low cholesterol) after your cath. ? After your cath, some bruising or discomfort is common during the healing process. Tylenol, 1-2 tablets every 6 hours as needed, is recommended if you experience any discomfort.   If you experience any signs or symptoms of infection such as fever, chills, or poorly healing incision, persistent tenderness or swelling in the groin, redness and/or warmth to the touch, numbness, significant tingling or pain at the groin site or affected extremity, rash, drainage from the cath site, or if the leg feels tight or swollen, call your physician right away. ? If bleeding at the cath site occurs, take a clean gauze pad and apply direct pressure to the groin just above the puncture site. Call 911 immediately, and continue to apply direct pressure until an ambulance gets to your location. ? You may return to work  2  days after your cardiac cath if no groin bleeding. Information obtained by : 
I understand that if any problems occur once I am at home I am to contact my physician. I understand and acknowledge receipt of the instructions indicated above. R.N.'s Signature                                                                  Date/Time Patient or Representative Signature                                                          Date/Time Jakub Fowler MD 
 
Discharge Orders None [employer] Announcement We are excited to announce that we are making your provider's discharge notes available to you in [employer]. You will see these notes when they are completed and signed by the physician that discharged you from your recent hospital stay. If you have any questions or concerns about any information you see in [employer], please call the Health Information Department where you were seen or reach out to your Primary Care Provider for more information about your plan of care. Introducing [facility]!    
 [employer] introduces [employer] patient portal. Now you can access parts of your medical record, email your doctor's office, and request medication refills online. 1. In your internet browser, go to [email or web address] 2. Click on the First Time User? Click Here link in the Sign In box. You will see the New Member Sign Up page. 3. Enter your [employer] Access Code exactly as it appears below. You will not need to use this code after youve completed the sign-up process. If you do not sign up before the expiration date, you must request a new code. · [employer] Access Code: [email or web address] Expires: 11/19/2017  7:41 AM 
 
4. Enter the last four digits of your Social Security Number (xxxx) and Date of Birth (mm/dd/yyyy) as indicated and click Submit. You will be taken to the next sign-up page. 5. Create a [employer] ID. This will be your [employer] login ID and cannot be changed, so think of one that is secure and easy to remember. 6. Create a [employer] password. You can change your password at any time. 7. Enter your Password Reset Question and Answer. This can be used at a later time if you forget your password. 8. Enter your e-mail address. You will receive e-mail notification when new information is available in [address] 9. Click Sign Up. You can now view and download portions of your medical record. 10. Click the Download Summary menu link to download a portable copy of your medical information. If you have questions, please visit the Frequently Asked Questions section of the [employer] website. Remember, [employer] is NOT to be used for urgent needs. For medical emergencies, dial 911. Now available from your iPhone and Android! General Information Please provide this summary of care documentation to your next provider. Patient Signature:  ____________________________________________________________ Date:  ____________________________________________________________  
  
Gwendlo Finger  Provider Signature: ____________________________________________________________ Date:  ____________________________________________________________

## 2017-08-19 NOTE — H&P
[address]   [address] [place] [address]   HISTORY AND PHYSICAL       Name:  Tran Jarrell   MR#:  [id number]   :  10/30/1930   Account #:  [de-identified]        Date of Adm:  2017       HISTORY OF PRESENT ILLNESS: This 51-year-old man is admitted   with recurring episodes of substernal chest discomfort, some of it is   pleuritic, some of it is short lasting, but there is a persistent sense of   chest tightness. The symptoms began around 2:30 this morning. He   was last admitted in 2015 by Dr. Rhoda Gee. He has a history of   coronary bypass grafting in . Shortly after his bypass, within the   first year, he had failure of his LIMA graft, and had native LAD stenting. He had done well since that time. He had had repeat catheterizations,   and at his last admission in 2015, a nuclear medicine scan   showed no evidence of myocardium at risk, with normal wall motion   and an ejection fraction of 53%. No invasive testing was done at that   time. An echo from that hospitalization showed an EF in the 60% to   65% range. He has not had orthopnea, PND, edema, no presyncope,   syncope or palpitations. PAST MEDICAL HISTORY: Obstructive sleep apnea, uses a CPAP. There had been a myocardial infarction in the past, hypertension, type   2 diabetes mellitus, prostate cancer. He is morbidly obese In addition   to coronary bypass grafting, he has had prostatectomy, stent   placement and colectomy dating back to . FAMILY HISTORY: Negative for premature heart disease. SOCIAL HISTORY: Here with attentive family. Quit smoking. No more   than a social drinker. 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 CURRENT MEDICATIONS   1. Amlodipine 10 mg daily. 2. Vitamin C 1000 mg a day. 3. Aspirin 81 mg a day. 4. Diprosone ointment as needed. 5. Casodex 50 mg a day. 6. Colace 100 twice a day. 7. Vitamin D 50,000 units every 30 days.    8. Zetia 10 mg a day. 9. Furosemide 40 mg a day. 10. Norco 5/325 q.6 h. p.r.n., has not used in 9 months. 11. Lisinopril 40 mg a day. 12. Loratadine 10 mg as needed daily. 13. Losartan 100 mg daily. 14. Metformin 500 twice a day. 15. Robaxin 750 nightly. 16. Metoprolol 25 at night. 17. Nitroglycerin 0.4 sublingual p.r.n. 18. Potassium 20 mEq every evening. 19. Pravachol 20 at bedtime. 20. Kenalog ointment as needed. ALLERGIES: IODINE. REVIEW OF SYSTEMS: Aside from the presenting symptoms in the   HPI, has been assessed and is negative. PHYSICAL EXAMINATION   GENERAL: Obese, pleasant elderly man in no acute distress. VITAL SIGNS: Blood pressure 183/91, pulse 72 and regular. HEENT: There is no jugular venous distention. Carotids are full without   bruits. There is bilateral arcus senilis. Pupils equal, round, react to light   and accommodation. Extraocular muscles full without nystagmus. Sclerae are clear. No adenopathy. No JVD. LUNGS: Show distant breath sounds, otherwise clear. HEART: Regular rate and rhythm. Distant tones. No murmur, rub,   gallop or click. ABDOMEN: Obese, without obvious masses or organomegaly. EXTREMITIES: 1+/4 edema. Pedal pulses are intact. SKIN: Intact. MUSCULOSKELETAL: No deformities. NEUROLOGIC: Nonfocal.    EKG: Normal sinus rhythm, anterolateral T-wave inversions, which   could indicate ischemia, unchanged from 2 years ago. CHEST X-RAY: No acute process. LABORATORY DATA: Hemoglobin 13.1, hematocrit 40.3, white count   4700, platelets 503,205. Sodium 142, potassium 4.1, chloride 109,   CO2 1.26. Troponins are negative x2. PROBLEMS   1. Unstable angina, with known underlying coronary artery disease,   awakening at 2:30 this morning with severe and recurring symptoms of   chest discomfort. 2. Morbid obesity. 3. Obstructive sleep apnea. 4. Hypertension. 5. Type 2 diabetes mellitus. 6. History of prostate cancer.     PLANS: Admit to telemetry bed, get serial enzymes, EKG. Plan for   noninvasive versus invasive evaluation depending on clinical course. Adjust medicines as needed.         MD Alfredo Hazel [place] [address] / [facility]   D:  08/19/2017   14:12   T:  08/19/2017   15:03   Job #:  [id number]

## 2017-08-19 NOTE — PROGRESS NOTES
Primary Nurse Lynne Beckham RN and [facility], RN performed a dual skin assessment on this patient No impairment noted

## 2017-08-20 LAB
ANION GAP SERPL CALC-SCNC: 9 MMOL/L (ref 5–15)
ATRIAL RATE: 58 BPM
BASOPHILS # BLD: 0 K/UL (ref 0–0.1)
BASOPHILS NFR BLD: 0 % (ref 0–1)
BUN SERPL-MCNC: 17 MG/DL (ref 6–20)
BUN/CREAT SERPL: 13 (ref 12–20)
CALCIUM SERPL-MCNC: 9.6 MG/DL (ref 8.5–10.1)
CALCULATED P AXIS, ECG09: 72 DEGREES
CALCULATED R AXIS, ECG10: 65 DEGREES
CALCULATED T AXIS, ECG11: 125 DEGREES
CHLORIDE SERPL-SCNC: 106 MMOL/L (ref 97–108)
CHOLEST SERPL-MCNC: 138 MG/DL
CK MB CFR SERPL CALC: NORMAL % (ref 0–2.5)
CK MB SERPL-MCNC: <1 NG/ML (ref 5–25)
CK SERPL-CCNC: 105 U/L (ref 39–308)
CO2 SERPL-SCNC: 27 MMOL/L (ref 21–32)
CREAT SERPL-MCNC: 1.27 MG/DL (ref 0.7–1.3)
DIAGNOSIS, 93000: NORMAL
EOSINOPHIL # BLD: 0.2 K/UL (ref 0–0.4)
EOSINOPHIL NFR BLD: 4 % (ref 0–7)
ERYTHROCYTE [DISTWIDTH] IN BLOOD BY AUTOMATED COUNT: 16.1 % (ref 11.5–14.5)
GLUCOSE SERPL-MCNC: 97 MG/DL (ref 65–100)
HCT VFR BLD AUTO: 39.5 % (ref 36.6–50.3)
HDLC SERPL-MCNC: 42 MG/DL
HDLC SERPL: 3.3 {RATIO} (ref 0–5)
HGB BLD-MCNC: 12.9 G/DL (ref 12.1–17)
LDLC SERPL CALC-MCNC: 68.2 MG/DL (ref 0–100)
LIPID PROFILE,FLP: NORMAL
LYMPHOCYTES # BLD: 2 K/UL (ref 0.8–3.5)
LYMPHOCYTES NFR BLD: 45 % (ref 12–49)
MCH RBC QN AUTO: 26.7 PG (ref 26–34)
MCHC RBC AUTO-ENTMCNC: 32.7 G/DL (ref 30–36.5)
MCV RBC AUTO: 81.6 FL (ref 80–99)
MONOCYTES # BLD: 0.3 K/UL (ref 0–1)
MONOCYTES NFR BLD: 7 % (ref 5–13)
NEUTS SEG # BLD: 2 K/UL (ref 1.8–8)
NEUTS SEG NFR BLD: 44 % (ref 32–75)
P-R INTERVAL, ECG05: 156 MS
PLATELET # BLD AUTO: 184 K/UL (ref 150–400)
POTASSIUM SERPL-SCNC: 3.9 MMOL/L (ref 3.5–5.1)
Q-T INTERVAL, ECG07: 446 MS
QRS DURATION, ECG06: 78 MS
QTC CALCULATION (BEZET), ECG08: 437 MS
RBC # BLD AUTO: 4.84 M/UL (ref 4.1–5.7)
SODIUM SERPL-SCNC: 142 MMOL/L (ref 136–145)
T4 FREE SERPL-MCNC: 1 NG/DL (ref 0.8–1.5)
TRIGL SERPL-MCNC: 139 MG/DL (ref ?–150)
TROPONIN I SERPL-MCNC: <0.04 NG/ML
TSH SERPL DL<=0.05 MIU/L-ACNC: 2.41 UIU/ML (ref 0.36–3.74)
VENTRICULAR RATE, ECG03: 58 BPM
VLDLC SERPL CALC-MCNC: 27.8 MG/DL
WBC # BLD AUTO: 4.5 K/UL (ref 4.1–11.1)

## 2017-08-20 PROCEDURE — 36415 COLL VENOUS BLD VENIPUNCTURE: CPT | Performed by: INTERNAL MEDICINE

## 2017-08-20 PROCEDURE — 93306 TTE W/DOPPLER COMPLETE: CPT

## 2017-08-20 PROCEDURE — 74011250637 HC RX REV CODE- 250/637: Performed by: INTERNAL MEDICINE

## 2017-08-20 PROCEDURE — 93005 ELECTROCARDIOGRAM TRACING: CPT

## 2017-08-20 PROCEDURE — 82550 ASSAY OF CK (CPK): CPT | Performed by: INTERNAL MEDICINE

## 2017-08-20 PROCEDURE — 84484 ASSAY OF TROPONIN QUANT: CPT | Performed by: INTERNAL MEDICINE

## 2017-08-20 PROCEDURE — 80048 BASIC METABOLIC PNL TOTAL CA: CPT | Performed by: INTERNAL MEDICINE

## 2017-08-20 PROCEDURE — 93041 RHYTHM ECG TRACING: CPT

## 2017-08-20 PROCEDURE — 65660000000 HC RM CCU STEPDOWN

## 2017-08-20 PROCEDURE — 84443 ASSAY THYROID STIM HORMONE: CPT | Performed by: INTERNAL MEDICINE

## 2017-08-20 PROCEDURE — 84439 ASSAY OF FREE THYROXINE: CPT | Performed by: INTERNAL MEDICINE

## 2017-08-20 PROCEDURE — 80061 LIPID PANEL: CPT | Performed by: INTERNAL MEDICINE

## 2017-08-20 PROCEDURE — 85025 COMPLETE CBC W/AUTO DIFF WBC: CPT | Performed by: INTERNAL MEDICINE

## 2017-08-20 PROCEDURE — 74011250636 HC RX REV CODE- 250/636: Performed by: INTERNAL MEDICINE

## 2017-08-20 RX ORDER — SUCRALFATE 1 G/1
1 TABLET ORAL
Status: DISCONTINUED | OUTPATIENT
Start: 2017-08-20 | End: 2017-08-24 | Stop reason: HOSPADM

## 2017-08-20 RX ORDER — SIMETHICONE 80 MG
80 TABLET,CHEWABLE ORAL
Status: DISCONTINUED | OUTPATIENT
Start: 2017-08-20 | End: 2017-08-24 | Stop reason: HOSPADM

## 2017-08-20 RX ORDER — PANTOPRAZOLE SODIUM 40 MG/1
40 TABLET, DELAYED RELEASE ORAL
Status: DISCONTINUED | OUTPATIENT
Start: 2017-08-20 | End: 2017-08-24 | Stop reason: HOSPADM

## 2017-08-20 RX ADMIN — TRIAMCINOLONE ACETONIDE: 1 CREAM TOPICAL at 09:00

## 2017-08-20 RX ADMIN — METOPROLOL TARTRATE 50 MG: 50 TABLET ORAL at 10:29

## 2017-08-20 RX ADMIN — POTASSIUM CHLORIDE 20 MEQ: 750 TABLET, FILM COATED, EXTENDED RELEASE ORAL at 17:51

## 2017-08-20 RX ADMIN — DOCUSATE SODIUM 100 MG: 100 CAPSULE, LIQUID FILLED ORAL at 22:39

## 2017-08-20 RX ADMIN — OXYCODONE HYDROCHLORIDE AND ACETAMINOPHEN 1000 MG: 500 TABLET ORAL at 08:41

## 2017-08-20 RX ADMIN — ASPIRIN 81 MG: 81 TABLET, COATED ORAL at 08:41

## 2017-08-20 RX ADMIN — METHOCARBAMOL 750 MG: 750 TABLET ORAL at 22:39

## 2017-08-20 RX ADMIN — METOPROLOL TARTRATE 50 MG: 50 TABLET ORAL at 22:39

## 2017-08-20 RX ADMIN — TRIAMCINOLONE ACETONIDE: 1 CREAM TOPICAL at 18:00

## 2017-08-20 RX ADMIN — SUCRALFATE 1 G: 1 TABLET ORAL at 10:31

## 2017-08-20 RX ADMIN — ENOXAPARIN SODIUM 40 MG: 40 INJECTION SUBCUTANEOUS at 16:55

## 2017-08-20 RX ADMIN — Medication 5 ML: at 06:00

## 2017-08-20 RX ADMIN — PANTOPRAZOLE SODIUM 40 MG: 40 TABLET, DELAYED RELEASE ORAL at 10:29

## 2017-08-20 RX ADMIN — BICALUTAMIDE 50 MG: 50 TABLET, FILM COATED ORAL at 08:42

## 2017-08-20 RX ADMIN — SIMETHICONE CHEW TAB 80 MG 80 MG: 80 TABLET ORAL at 16:55

## 2017-08-20 RX ADMIN — METFORMIN HYDROCHLORIDE 500 MG: 500 TABLET, FILM COATED ORAL at 16:55

## 2017-08-20 RX ADMIN — AMLODIPINE BESYLATE 10 MG: 5 TABLET ORAL at 16:54

## 2017-08-20 RX ADMIN — Medication 5 ML: at 14:00

## 2017-08-20 RX ADMIN — SIMETHICONE CHEW TAB 80 MG 80 MG: 80 TABLET ORAL at 22:39

## 2017-08-20 RX ADMIN — Medication 10 ML: at 22:39

## 2017-08-20 RX ADMIN — METFORMIN HYDROCHLORIDE 500 MG: 500 TABLET, FILM COATED ORAL at 08:41

## 2017-08-20 RX ADMIN — FUROSEMIDE 40 MG: 40 TABLET ORAL at 16:55

## 2017-08-20 RX ADMIN — LISINOPRIL 40 MG: 20 TABLET ORAL at 16:55

## 2017-08-20 RX ADMIN — ACETAMINOPHEN 650 MG: 325 TABLET, FILM COATED ORAL at 22:39

## 2017-08-20 RX ADMIN — PRAVASTATIN SODIUM 20 MG: 20 TABLET ORAL at 22:39

## 2017-08-20 NOTE — PROGRESS NOTES
Problem: Falls - Risk of  Goal: *Absence of Falls  Document J Luis Fall Risk and appropriate interventions in the flowsheet. Outcome: Progressing Towards Goal  Fall Risk Interventions:     Pt has not fallen. Call bell and belongings in reach, hourly rounds performed, nonkid footwear applied, room near nurses' station. Medication Interventions: Evaluate medications/consider consulting pharmacy                       Problem: Unstable angina/NSTEMI: Day of Admission/Day 1  Goal: *Optimal pain control at patients stated goal  Outcome: Progressing Towards Goal  Pt complains of no chest tightness at the moment. Pt states \"When I sleep it goes away. I am great right now\". 1420  Pt complains of cramps in his belly. MD Shanelle Browning notified at 1500.     1450  Pt complains of chest pain. \"I was asleep, and a sudden sharp pain in my chest woke me\". Pt states it was \"just like the pain I had when I came to the hospital\". B/P 92/70. Pt states he was not having pain by the time the RN came into the room. EKG performed. B/P 159/63. MD link. Will continue to monitor. 500 [facility] notified. Orders received for Simethicone. Will administer and continue to monitor.

## 2017-08-20 NOTE — PROGRESS NOTES
Cardiology Progress Note  2017     Admit Date: 2017  Admit Diagnosis: Unstable angina (HCC)  CC: none currently    Assessment:   Active Problems:    Unstable angina ([address] (2017)      Plan:   Persistent and vague mid-upper CP. Not SOB and no distress. Negative troponin and no new EKG changes. Adjust meds and cover for GERD. Volume status:euvolemic  Renal function: stable    For other plans, see orders.   Subjective: Margaret Banerjee reports   Chest Pain:  []   none,  consistent with  []   non-cardiac   [x]   atypical   []   angina             []   none now    [x]      on-going  Dyspnea: [x]   none  []   at rest  []   with exertion     []   improved   []   unchanged   []   worsening  PND:       [x]   none  []   overnight    Orthopnea: [x]   none  []   improved  []   unchanged  []   worsening  Presyncope: [x]   none   []   improved    []   unchanged    []   worsening  Ambulated in hallway without symptoms  []   Yes  Ambulated in room without symptoms  []   Yes    Objective:    Physical Exam:  Overall VSSAF;    Visit Vitals    /73 (BP 1 Location: Right arm, BP Patient Position: At rest;Supine)    Pulse 63    Temp 98 °F (36.7 °C)    Resp 20    Ht 5' 6\" (1.676 m)    Wt 121.8 kg (268 lb 8.3 oz)    SpO2 94%    BMI 43.34 kg/m2     Temp (24hrs), Av.3 °F (36.8 °C), Min:98 °F (36.7 °C), Max:98.5 °F (36.9 °C)    Patient Vitals for the past 8 hrs:   Pulse   17 0910 63   17 0750 (!) 54   17 0400 (!) 59   17 0314 (!) 52   17 0300 (!) 55   17 0200 (!) 56    Patient Vitals for the past 8 hrs:   Resp   17 0750 20   17 0314 20    Patient Vitals for the past 8 hrs:   BP   17 0750 122/73   17 0314 126/65        Intake/Output Summary (Last 24 hours) at 17 0914  Last data filed at 17 0314   Gross per 24 hour   Intake              480 ml   Output             2000 ml   Net            -1520 ml       General Appearance: Well developed, well nourished, no acute distress. Ears/Nose/Mouth/Throat:   Normal MM; anicteric. JVP: WNL   Resp:   Lungs clear to auscultation bilaterally. Nl resp effort. Cardiovascular:  RRR, S1, S2 normal, no new murmur. No gallop or rub. Abdomen:   Soft, non-tender, bowel sounds are present. Obese. Extremities: 1+ edema bilaterally. Skin:  Neuro: Warm and dry. A/O x3, grossly nonfocal    []      cath site intact w/o hematoma or bruit; distal pulse unchanged. Data Review:     Telemetry independently reviewed : [x]   sinus  []   chronic afib   []   par afib  []      NSVT    ECG independently reviewed:  [x]   NSR   [x]   no significant changes  []   no new ECG provided for review  Lab results reviewed as noted below. Current medications reviewed as noted below. No results for input(s): PH, PCO2, PO2 in the last 72 hours. Recent Labs      08/20/17   0442  08/19/17   1235  08/19/17   1015   CPK  105   --    --    CKMB  <1.0   --    --    TROIQ  <0.04  <0.04  <0.04     Recent Labs      08/20/17   0442  08/19/17   1015   NA  142  142   K  3.9  4.1   CL  106  109*   CO2  27  28   BUN  17  23*   CREA  1.27  1.26   GLU  97  99   CA  9.6  9.6   ALB   --   3.9   WBC  4.5  4.7   HGB  12.9  13.1   HCT  39.5  40.3   PLT  184  185     Recent Labs      08/19/17   1015   SGOT  11*   ALT  16   AP  84   TBILI  0.4   TP  7.9   ALB  3.9   GLOB  4.0     No results for input(s): INR, PTP, APTT in the last 72 hours. No lab exists for component: INREXT   No results for input(s): FE, TIBC, PSAT, FERR in the last 72 hours.    No results found for: GLUCPOC    Current Facility-Administered Medications   Medication Dose Route Frequency    amLODIPine (NORVASC) tablet 10 mg  10 mg Oral DAILY    ascorbic acid (vitamin C) (VITAMIN C) tablet 1,000 mg  1,000 mg Oral DAILY    aspirin delayed-release tablet 81 mg  81 mg Oral DAILY    bicalutamide (CASODEX) tablet 50 mg  50 mg Oral DAILY    docusate sodium (COLACE) capsule 100 mg  100 mg Oral QHS    furosemide (LASIX) tablet 40 mg  40 mg Oral DAILY    lisinopril (PRINIVIL, ZESTRIL) tablet 40 mg  40 mg Oral DAILY    loratadine (CLARITIN) tablet 10 mg  10 mg Oral QHS PRN    metFORMIN (GLUCOPHAGE) tablet 500 mg  500 mg Oral BID WITH MEALS    methocarbamol (ROBAXIN) tablet 750 mg  750 mg Oral QHS    pravastatin (PRAVACHOL) tablet 20 mg  20 mg Oral QHS    sodium chloride (NS) flush 5-10 mL  5-10 mL IntraVENous Q8H    sodium chloride (NS) flush 5-10 mL  5-10 mL IntraVENous PRN    acetaminophen (TYLENOL) tablet 650 mg  650 mg Oral Q4H PRN    enoxaparin (LOVENOX) injection 40 mg  40 mg SubCUTAneous Q24H    nitroglycerin (NITROSTAT) tablet 0.4 mg  0.4 mg SubLINGual Q5MIN PRN    metoprolol tartrate (LOPRESSOR) tablet 50 mg  50 mg Oral BID    triamcinolone acetonide (KENALOG) 0.1 % cream   Topical BID    potassium chloride SR (KLOR-CON 10) tablet 20 mEq  20 mEq Oral QPM        Karla Taylor MD

## 2017-08-20 NOTE — PROGRESS NOTES
6433  RN in room, pt complains of \"chest tightness\". Pt states \"It's not pain. The pain went away, but now I feel chest tightness\". Will perform EKG and notify MD.     0900   EKG performed. Pt refuses pain medication and Nitro stating \"it gives me such a headache\". Will continue to monitor. De[facility]re [facility] notified. No new orders received. Pt in bed resting. Will continue to monitor. 1000  Pt resting in bed, no complaints of pain or tightness. Will continue to monitor.

## 2017-08-20 NOTE — PROGRESS NOTES
Bedside shift change report given to Montrell (oncoming nurse) by ANALILIA Torres (offgoing nurse). Report included the following information SBAR, Kardex, MAR and Recent Results.

## 2017-08-21 LAB
ATRIAL RATE: 56 BPM
CALCULATED P AXIS, ECG09: 67 DEGREES
CALCULATED R AXIS, ECG10: 63 DEGREES
CALCULATED T AXIS, ECG11: 102 DEGREES
DIAGNOSIS, 93000: NORMAL
GLUCOSE BLD STRIP.AUTO-MCNC: 94 MG/DL (ref 65–100)
P-R INTERVAL, ECG05: 170 MS
Q-T INTERVAL, ECG07: 434 MS
QRS DURATION, ECG06: 82 MS
QTC CALCULATION (BEZET), ECG08: 418 MS
SERVICE CMNT-IMP: NORMAL
TROPONIN I SERPL-MCNC: <0.04 NG/ML
VENTRICULAR RATE, ECG03: 56 BPM

## 2017-08-21 PROCEDURE — 77030013744

## 2017-08-21 PROCEDURE — 4A023N7 MEASUREMENT OF CARDIAC SAMPLING AND PRESSURE, LEFT HEART, PERCUTANEOUS APPROACH: ICD-10-PCS | Performed by: INTERNAL MEDICINE

## 2017-08-21 PROCEDURE — 77030004533 HC CATH ANGI DX IMP BSC -B

## 2017-08-21 PROCEDURE — C1894 INTRO/SHEATH, NON-LASER: HCPCS

## 2017-08-21 PROCEDURE — 77030013715 HC INFL SYS MRTM -B

## 2017-08-21 PROCEDURE — C1874 STENT, COATED/COV W/DEL SYS: HCPCS

## 2017-08-21 PROCEDURE — 74011636320 HC RX REV CODE- 636/320: Performed by: INTERNAL MEDICINE

## 2017-08-21 PROCEDURE — 93005 ELECTROCARDIOGRAM TRACING: CPT

## 2017-08-21 PROCEDURE — 74011250636 HC RX REV CODE- 250/636: Performed by: INTERNAL MEDICINE

## 2017-08-21 PROCEDURE — 74011250637 HC RX REV CODE- 250/637: Performed by: INTERNAL MEDICINE

## 2017-08-21 PROCEDURE — 93459 L HRT ART/GRFT ANGIO: CPT

## 2017-08-21 PROCEDURE — 36415 COLL VENOUS BLD VENIPUNCTURE: CPT | Performed by: INTERNAL MEDICINE

## 2017-08-21 PROCEDURE — B2131ZZ FLUOROSCOPY OF MULTIPLE CORONARY ARTERY BYPASS GRAFTS USING LOW OSMOLAR CONTRAST: ICD-10-PCS | Performed by: INTERNAL MEDICINE

## 2017-08-21 PROCEDURE — 74011000258 HC RX REV CODE- 258: Performed by: INTERNAL MEDICINE

## 2017-08-21 PROCEDURE — B2111ZZ FLUOROSCOPY OF MULTIPLE CORONARY ARTERIES USING LOW OSMOLAR CONTRAST: ICD-10-PCS | Performed by: INTERNAL MEDICINE

## 2017-08-21 PROCEDURE — C1887 CATHETER, GUIDING: HCPCS

## 2017-08-21 PROCEDURE — 027034Z DILATION OF CORONARY ARTERY, ONE ARTERY WITH DRUG-ELUTING INTRALUMINAL DEVICE, PERCUTANEOUS APPROACH: ICD-10-PCS | Performed by: INTERNAL MEDICINE

## 2017-08-21 PROCEDURE — 74011000250 HC RX REV CODE- 250: Performed by: INTERNAL MEDICINE

## 2017-08-21 PROCEDURE — 84484 ASSAY OF TROPONIN QUANT: CPT | Performed by: INTERNAL MEDICINE

## 2017-08-21 PROCEDURE — C1725 CATH, TRANSLUMIN NON-LASER: HCPCS

## 2017-08-21 PROCEDURE — 65660000000 HC RM CCU STEPDOWN

## 2017-08-21 PROCEDURE — 74011250636 HC RX REV CODE- 250/636

## 2017-08-21 PROCEDURE — B2151ZZ FLUOROSCOPY OF LEFT HEART USING LOW OSMOLAR CONTRAST: ICD-10-PCS | Performed by: INTERNAL MEDICINE

## 2017-08-21 PROCEDURE — C1769 GUIDE WIRE: HCPCS

## 2017-08-21 PROCEDURE — C1760 CLOSURE DEV, VASC: HCPCS

## 2017-08-21 PROCEDURE — 82962 GLUCOSE BLOOD TEST: CPT

## 2017-08-21 RX ORDER — LIDOCAINE HYDROCHLORIDE 10 MG/ML
10-30 INJECTION INFILTRATION; PERINEURAL ONCE
Status: COMPLETED | OUTPATIENT
Start: 2017-08-21 | End: 2017-08-21

## 2017-08-21 RX ORDER — SODIUM CHLORIDE 9 MG/ML
3 INJECTION, SOLUTION INTRAVENOUS CONTINUOUS
Status: DISPENSED | OUTPATIENT
Start: 2017-08-21 | End: 2017-08-21

## 2017-08-21 RX ORDER — DIPHENHYDRAMINE HYDROCHLORIDE 50 MG/ML
INJECTION, SOLUTION INTRAMUSCULAR; INTRAVENOUS
Status: COMPLETED
Start: 2017-08-21 | End: 2017-08-21

## 2017-08-21 RX ORDER — FENTANYL CITRATE 50 UG/ML
25-200 INJECTION, SOLUTION INTRAMUSCULAR; INTRAVENOUS
Status: DISCONTINUED | OUTPATIENT
Start: 2017-08-21 | End: 2017-08-21 | Stop reason: ALTCHOICE

## 2017-08-21 RX ORDER — METOPROLOL TARTRATE 25 MG/1
25 TABLET, FILM COATED ORAL 2 TIMES DAILY
Status: DISCONTINUED | OUTPATIENT
Start: 2017-08-21 | End: 2017-08-24 | Stop reason: HOSPADM

## 2017-08-21 RX ORDER — CLOPIDOGREL 300 MG/1
600 TABLET, FILM COATED ORAL AS NEEDED
Status: DISCONTINUED | OUTPATIENT
Start: 2017-08-21 | End: 2017-08-21 | Stop reason: ALTCHOICE

## 2017-08-21 RX ORDER — HEPARIN SODIUM 200 [USP'U]/100ML
1000 INJECTION, SOLUTION INTRAVENOUS AS NEEDED
Status: DISCONTINUED | OUTPATIENT
Start: 2017-08-21 | End: 2017-08-21 | Stop reason: ALTCHOICE

## 2017-08-21 RX ORDER — MIDAZOLAM HYDROCHLORIDE 1 MG/ML
.5-1 INJECTION, SOLUTION INTRAMUSCULAR; INTRAVENOUS
Status: DISCONTINUED | OUTPATIENT
Start: 2017-08-21 | End: 2017-08-21 | Stop reason: ALTCHOICE

## 2017-08-21 RX ORDER — HYDRALAZINE HYDROCHLORIDE 20 MG/ML
20 INJECTION INTRAMUSCULAR; INTRAVENOUS ONCE
Status: COMPLETED | OUTPATIENT
Start: 2017-08-21 | End: 2017-08-21

## 2017-08-21 RX ORDER — HEPARIN SODIUM 1000 [USP'U]/ML
10000 INJECTION, SOLUTION INTRAVENOUS; SUBCUTANEOUS
Status: DISCONTINUED | OUTPATIENT
Start: 2017-08-21 | End: 2017-08-21 | Stop reason: ALTCHOICE

## 2017-08-21 RX ORDER — DIPHENHYDRAMINE HYDROCHLORIDE 50 MG/ML
25 INJECTION, SOLUTION INTRAMUSCULAR; INTRAVENOUS ONCE
Status: COMPLETED | OUTPATIENT
Start: 2017-08-21 | End: 2017-08-21

## 2017-08-21 RX ORDER — CLOPIDOGREL 300 MG/1
600 TABLET, FILM COATED ORAL
Status: DISCONTINUED | OUTPATIENT
Start: 2017-08-21 | End: 2017-08-21 | Stop reason: ALTCHOICE

## 2017-08-21 RX ORDER — HYDROCORTISONE SODIUM SUCCINATE 100 MG/2ML
100 INJECTION, POWDER, FOR SOLUTION INTRAMUSCULAR; INTRAVENOUS ONCE
Status: COMPLETED | OUTPATIENT
Start: 2017-08-21 | End: 2017-08-21

## 2017-08-21 RX ORDER — SODIUM CHLORIDE 0.9 % (FLUSH) 0.9 %
10 SYRINGE (ML) INJECTION AS NEEDED
Status: DISCONTINUED | OUTPATIENT
Start: 2017-08-21 | End: 2017-08-21 | Stop reason: ALTCHOICE

## 2017-08-21 RX ORDER — VERAPAMIL HYDROCHLORIDE 2.5 MG/ML
2.5-5 INJECTION, SOLUTION INTRAVENOUS
Status: DISCONTINUED | OUTPATIENT
Start: 2017-08-21 | End: 2017-08-21 | Stop reason: ALTCHOICE

## 2017-08-21 RX ORDER — HYDROCORTISONE SODIUM SUCCINATE 100 MG/2ML
INJECTION, POWDER, FOR SOLUTION INTRAMUSCULAR; INTRAVENOUS
Status: COMPLETED
Start: 2017-08-21 | End: 2017-08-21

## 2017-08-21 RX ORDER — SODIUM CHLORIDE 9 MG/ML
1.5 INJECTION, SOLUTION INTRAVENOUS CONTINUOUS
Status: DISPENSED | OUTPATIENT
Start: 2017-08-21 | End: 2017-08-21

## 2017-08-21 RX ORDER — ATROPINE SULFATE 0.1 MG/ML
1 INJECTION INTRAVENOUS AS NEEDED
Status: DISCONTINUED | OUTPATIENT
Start: 2017-08-21 | End: 2017-08-21 | Stop reason: ALTCHOICE

## 2017-08-21 RX ORDER — CLOPIDOGREL BISULFATE 75 MG/1
75 TABLET ORAL DAILY
Status: DISCONTINUED | OUTPATIENT
Start: 2017-08-22 | End: 2017-08-24 | Stop reason: HOSPADM

## 2017-08-21 RX ORDER — PRASUGREL 10 MG/1
10-60 TABLET, FILM COATED ORAL AS NEEDED
Status: DISCONTINUED | OUTPATIENT
Start: 2017-08-21 | End: 2017-08-21 | Stop reason: ALTCHOICE

## 2017-08-21 RX ADMIN — HYDROCORTISONE SODIUM SUCCINATE 100 MG: 100 INJECTION, POWDER, FOR SOLUTION INTRAMUSCULAR; INTRAVENOUS at 10:04

## 2017-08-21 RX ADMIN — METOPROLOL TARTRATE 25 MG: 25 TABLET ORAL at 21:22

## 2017-08-21 RX ADMIN — MIDAZOLAM HYDROCHLORIDE 1 MG: 1 INJECTION, SOLUTION INTRAMUSCULAR; INTRAVENOUS at 09:56

## 2017-08-21 RX ADMIN — FUROSEMIDE 40 MG: 40 TABLET ORAL at 16:29

## 2017-08-21 RX ADMIN — BIVALIRUDIN 211.22 MG/HR: 250 INJECTION, POWDER, LYOPHILIZED, FOR SOLUTION INTRAVENOUS at 10:19

## 2017-08-21 RX ADMIN — Medication 10 ML: at 22:00

## 2017-08-21 RX ADMIN — SUCRALFATE 1 G: 1 TABLET ORAL at 21:22

## 2017-08-21 RX ADMIN — AMLODIPINE BESYLATE 10 MG: 5 TABLET ORAL at 16:29

## 2017-08-21 RX ADMIN — TRIAMCINOLONE ACETONIDE: 1 CREAM TOPICAL at 12:57

## 2017-08-21 RX ADMIN — DOCUSATE SODIUM 100 MG: 100 CAPSULE, LIQUID FILLED ORAL at 21:22

## 2017-08-21 RX ADMIN — CLOPIDOGREL BISULFATE 600 MG: 300 TABLET, FILM COATED ORAL at 10:51

## 2017-08-21 RX ADMIN — PANTOPRAZOLE SODIUM 40 MG: 40 TABLET, DELAYED RELEASE ORAL at 12:56

## 2017-08-21 RX ADMIN — TRIAMCINOLONE ACETONIDE: 1 CREAM TOPICAL at 17:38

## 2017-08-21 RX ADMIN — SUCRALFATE 1 G: 1 TABLET ORAL at 12:56

## 2017-08-21 RX ADMIN — ASPIRIN 81 MG: 81 TABLET, COATED ORAL at 12:56

## 2017-08-21 RX ADMIN — SODIUM CHLORIDE 3 ML/KG/HR: 900 INJECTION, SOLUTION INTRAVENOUS at 08:18

## 2017-08-21 RX ADMIN — DIPHENHYDRAMINE HYDROCHLORIDE 25 MG: 50 INJECTION, SOLUTION INTRAMUSCULAR; INTRAVENOUS at 10:00

## 2017-08-21 RX ADMIN — FENTANYL CITRATE 25 MCG: 50 INJECTION, SOLUTION INTRAMUSCULAR; INTRAVENOUS at 09:56

## 2017-08-21 RX ADMIN — LISINOPRIL 40 MG: 20 TABLET ORAL at 16:29

## 2017-08-21 RX ADMIN — HEPARIN SODIUM 2000 UNITS: 200 INJECTION, SOLUTION INTRAVENOUS at 09:56

## 2017-08-21 RX ADMIN — PRAVASTATIN SODIUM 20 MG: 20 TABLET ORAL at 21:22

## 2017-08-21 RX ADMIN — Medication 10 ML: at 09:56

## 2017-08-21 RX ADMIN — IOPAMIDOL 50 ML: 755 INJECTION, SOLUTION INTRAVENOUS at 10:19

## 2017-08-21 RX ADMIN — METHOCARBAMOL 750 MG: 750 TABLET ORAL at 21:22

## 2017-08-21 RX ADMIN — HYDRALAZINE HYDROCHLORIDE 20 MG: 20 INJECTION INTRAMUSCULAR; INTRAVENOUS at 11:55

## 2017-08-21 RX ADMIN — MIDAZOLAM HYDROCHLORIDE 1 MG: 1 INJECTION, SOLUTION INTRAMUSCULAR; INTRAVENOUS at 10:05

## 2017-08-21 RX ADMIN — LIDOCAINE HYDROCHLORIDE 10 ML: 10 INJECTION, SOLUTION INFILTRATION; PERINEURAL at 10:04

## 2017-08-21 RX ADMIN — BICALUTAMIDE 50 MG: 50 TABLET, FILM COATED ORAL at 12:57

## 2017-08-21 RX ADMIN — OXYCODONE HYDROCHLORIDE AND ACETAMINOPHEN 1000 MG: 500 TABLET ORAL at 12:56

## 2017-08-21 RX ADMIN — FENTANYL CITRATE 25 MCG: 50 INJECTION, SOLUTION INTRAMUSCULAR; INTRAVENOUS at 10:05

## 2017-08-21 RX ADMIN — SUCRALFATE 1 G: 1 TABLET ORAL at 16:29

## 2017-08-21 RX ADMIN — POTASSIUM CHLORIDE 20 MEQ: 750 TABLET, FILM COATED, EXTENDED RELEASE ORAL at 17:39

## 2017-08-21 RX ADMIN — IOPAMIDOL 261 ML: 755 INJECTION, SOLUTION INTRAVENOUS at 10:46

## 2017-08-21 NOTE — CDMP QUERY
Dear Dr. Renetta Castrejon,    Please clarify if this patient is being treated/managed for:    =>Obesity hypoventilation syndrome in the setting of morbid obesity requiring treatment with CPAP   =>Other Explanation of clinical findings  =>Unable to Determine (no explanation of clinical findings)    The medical record reflects the following clinical findings, treatment, and risk factors:    Risk Factors: 87yo cc chest pain, nausea, headache  Clinical Indicators: per H&P\" Morbid obesity, Obstructive sleep apnea\", headaches  Treatment: CPAP    Please clarify and document your clinical opinion in the progress notes and discharge summary including the definitive and/or presumptive diagnosis, (suspected or probable), related to the above clinical findings. Please include clinical findings supporting your diagnosis.     Thank You  Jeanne Johns,MSN,BSN,RN,[facility]  [phone]

## 2017-08-21 NOTE — PROGRESS NOTES
Bedside and Verbal shift change report given to Shantelle Mckeon RN (oncoming nurse) by Amber Mahoney RN (offgoing nurse). Report included the following information SBAR, Kardex, Intake/Output, MAR and Recent Results.

## 2017-08-21 NOTE — PROGRESS NOTES
1058:  TRANSFER - IN REPORT:    Verbal report received from Nelson Stratton on Donovan Hernandez , from the Cardiac Cath lab, for routine progression of care. Report consisted of patients Situation, Background, Assessment and Recommendations(SBAR). Information from the following report(s) Procedure Summary, Intake/Output, MAR and Recent Results was reviewed with the receiving clinician. Opportunity for questions and clarification was provided. Assessment completed upon patients arrival to [address] [place] [address] and care assumed. Cardiac Cath Lab Recovery Arrival Note:     Donovan Hernandez arrived to [facility] recovery area. Patient procedure= LHC. Patient on cardiac monitor, non-invasive blood pressure, Patient status doing well without problems. Patient is A&Ox 4. Patient reports no complaints. Procedure site without any bleeding and no hematoma. Dr. Wilberto Richmond made aware of the patient's elevated blood pressure. Orders to be received.

## 2017-08-21 NOTE — PROCEDURES
Cardiac Catheterization Procedure Note   Patient: Arian Holder  MRN: [id number]  SSN: [id number]   YOB: 1930 Age: 80 y.o.   Sex: male    Date of Procedure: 8/21/2017   Pre-procedure Diagnosis: Chest pain CCS Class IV  Post-procedure Diagnosis: Coronary Artery Disease  Procedure: Left Heart Cath, PCI, to RCA and to LAD  :  Dr. Chapincito Jones MD    Assistant(s):  None  Anesthesia: Moderate Sedation   Estimated Blood Loss: Less than 10 mL   Specimens Removed: None  Findings: 90% discrete proximal LAD stenosis, in-stent, 80% discrete proximal RCA stenosis, patent SVG to OM1 and OM2; normal LV filling pressure, EF 65%; no MR; no AS; POBA of LAD in-stent lesion with a 3.0 x 14 NC balloon, peak MALLORY 14 mm with an excellent angiographic result and 0% residual stenosis; direct stenting of the native proximal   rca with a 3.0 x 14 Resolute JENNY peak MALLORY 14 MALLORY with 0% residual stenosis with an excellent angiographic result; well tolerated to RR in stable condition    Complications: None   Implants:  None  Signed by:  Chapincito Jones MD  8/21/2017  10:51 AM

## 2017-08-21 NOTE — CARDIO/PULMONARY
Cardiac Wellness: CAD education folder to bedside of Alan Murray. Educated using teach back method. Reviewed CAD diagnosis definition and purpose of intervention. Identified pertinent CAD risk factors including diabetes, HTN and inactivity. Patient is a non smoker. Reviewed importance of medication compliance and follow up appointments with cardiologist.  Discussed purpose of Plavix and potential side effects, signs and symptoms to report to physician after discharge. Emphasized value of cardiac rehab, discussed Cardiac Wellness Program and encouraged enrollment. Alan Murray is interested in the program.     Will continue to follow for educational needs and follow up with patient by phone after discharge for  participation. Alan Murray verbalized understanding with questions answered.     Andrew Saldivar RN

## 2017-08-21 NOTE — ROUTINE PROCESS
Cardiac Cath Lab Procedure Area Arrival Note:    Krista Richardson arrived to Cardiac Cath Lab, Procedure Area. Patient identifiers verified with NAME and DATE OF BIRTH. Procedure verified with patient. Consent forms verified. Allergies verified. Patient informed of procedure and plan of care. Questions answered with review. Patient voiced understanding of procedure and plan of care. Patient on cardiac monitor, non-invasive blood pressure, SPO2 monitor. On O2 @ 2 lpm via NC.  IV of NS on pump at 181 ml/hr. Patient status doing well without problems. Patient is A&Ox 4. Patient reports no pain. Patient medicated during procedure with orders obtained and verified by Dr. Kar Arredondo. Refer to patients Cardiac Cath Lab PROCEDURE REPORT for vital signs, assessment, status, and response during procedure, printed at end of case. Printed report on chart or scanned into chart. TRANSFER - OUT REPORT:    Verbal report given to OSCAR Gonzales on Krista Richardson being transferred to cath lab recovery for routine progression of care       Report consisted of patients Situation, Background, Assessment and   Recommendations(SBAR). Information from the following report(s) Procedure Summary was reviewed with the receiving nurse. Opportunity for questions and clarification was provided.

## 2017-08-22 ENCOUNTER — APPOINTMENT (OUTPATIENT)
Dept: ULTRASOUND IMAGING | Age: 82
DRG: 247 | End: 2017-08-22
Attending: INTERNAL MEDICINE
Payer: MEDICARE

## 2017-08-22 LAB
ANION GAP SERPL CALC-SCNC: 6 MMOL/L (ref 5–15)
APPEARANCE UR: CLEAR
ATRIAL RATE: 68 BPM
BACTERIA URNS QL MICRO: NEGATIVE /HPF
BILIRUB UR QL: NEGATIVE
BUN SERPL-MCNC: 19 MG/DL (ref 6–20)
BUN/CREAT SERPL: 13 (ref 12–20)
CALCIUM SERPL-MCNC: 9 MG/DL (ref 8.5–10.1)
CALCULATED P AXIS, ECG09: -6 DEGREES
CALCULATED R AXIS, ECG10: 65 DEGREES
CALCULATED T AXIS, ECG11: 108 DEGREES
CHLORIDE SERPL-SCNC: 108 MMOL/L (ref 97–108)
CO2 SERPL-SCNC: 26 MMOL/L (ref 21–32)
COLOR UR: NORMAL
CREAT SERPL-MCNC: 1.45 MG/DL (ref 0.7–1.3)
DIAGNOSIS, 93000: NORMAL
EPITH CASTS URNS QL MICRO: NORMAL /LPF
GLUCOSE BLD STRIP.AUTO-MCNC: 114 MG/DL (ref 65–100)
GLUCOSE BLD STRIP.AUTO-MCNC: 123 MG/DL (ref 65–100)
GLUCOSE SERPL-MCNC: 97 MG/DL (ref 65–100)
GLUCOSE UR STRIP.AUTO-MCNC: NEGATIVE MG/DL
HGB UR QL STRIP: NEGATIVE
HYALINE CASTS URNS QL MICRO: NORMAL /LPF (ref 0–5)
KETONES UR QL STRIP.AUTO: NEGATIVE MG/DL
LEUKOCYTE ESTERASE UR QL STRIP.AUTO: NEGATIVE
NITRITE UR QL STRIP.AUTO: NEGATIVE
P-R INTERVAL, ECG05: 176 MS
PH UR STRIP: 5 [PH] (ref 5–8)
POTASSIUM SERPL-SCNC: 4 MMOL/L (ref 3.5–5.1)
PROT UR STRIP-MCNC: NEGATIVE MG/DL
Q-T INTERVAL, ECG07: 430 MS
QRS DURATION, ECG06: 78 MS
QTC CALCULATION (BEZET), ECG08: 457 MS
RBC #/AREA URNS HPF: NORMAL /HPF (ref 0–5)
SERVICE CMNT-IMP: ABNORMAL
SERVICE CMNT-IMP: ABNORMAL
SODIUM SERPL-SCNC: 140 MMOL/L (ref 136–145)
SP GR UR REFRACTOMETRY: 1.01 (ref 1–1.03)
UA: UC IF INDICATED,UAUC: NORMAL
UROBILINOGEN UR QL STRIP.AUTO: 0.2 EU/DL (ref 0.2–1)
VENTRICULAR RATE, ECG03: 68 BPM
WBC URNS QL MICRO: NORMAL /HPF (ref 0–4)

## 2017-08-22 PROCEDURE — 74011000250 HC RX REV CODE- 250: Performed by: INTERNAL MEDICINE

## 2017-08-22 PROCEDURE — 74011250636 HC RX REV CODE- 250/636: Performed by: INTERNAL MEDICINE

## 2017-08-22 PROCEDURE — 36415 COLL VENOUS BLD VENIPUNCTURE: CPT | Performed by: INTERNAL MEDICINE

## 2017-08-22 PROCEDURE — 74011250637 HC RX REV CODE- 250/637: Performed by: INTERNAL MEDICINE

## 2017-08-22 PROCEDURE — 82962 GLUCOSE BLOOD TEST: CPT

## 2017-08-22 PROCEDURE — 81001 URINALYSIS AUTO W/SCOPE: CPT | Performed by: INTERNAL MEDICINE

## 2017-08-22 PROCEDURE — 80048 BASIC METABOLIC PNL TOTAL CA: CPT | Performed by: INTERNAL MEDICINE

## 2017-08-22 PROCEDURE — 76770 US EXAM ABDO BACK WALL COMP: CPT

## 2017-08-22 PROCEDURE — 65660000000 HC RM CCU STEPDOWN

## 2017-08-22 RX ORDER — ACETYLCYSTEINE 200 MG/ML
600 SOLUTION ORAL; RESPIRATORY (INHALATION) 2 TIMES DAILY
Status: DISCONTINUED | OUTPATIENT
Start: 2017-08-22 | End: 2017-08-23

## 2017-08-22 RX ORDER — SODIUM CHLORIDE 9 MG/ML
100 INJECTION, SOLUTION INTRAVENOUS CONTINUOUS
Status: DISPENSED | OUTPATIENT
Start: 2017-08-22 | End: 2017-08-23

## 2017-08-22 RX ADMIN — LISINOPRIL 40 MG: 20 TABLET ORAL at 16:18

## 2017-08-22 RX ADMIN — METHOCARBAMOL 750 MG: 750 TABLET ORAL at 21:01

## 2017-08-22 RX ADMIN — ASPIRIN 81 MG: 81 TABLET, COATED ORAL at 08:39

## 2017-08-22 RX ADMIN — PRAVASTATIN SODIUM 20 MG: 20 TABLET ORAL at 21:01

## 2017-08-22 RX ADMIN — SUCRALFATE 1 G: 1 TABLET ORAL at 11:35

## 2017-08-22 RX ADMIN — FUROSEMIDE 40 MG: 40 TABLET ORAL at 16:17

## 2017-08-22 RX ADMIN — Medication 10 ML: at 13:20

## 2017-08-22 RX ADMIN — POTASSIUM CHLORIDE 20 MEQ: 750 TABLET, FILM COATED, EXTENDED RELEASE ORAL at 17:52

## 2017-08-22 RX ADMIN — SUCRALFATE 1 G: 1 TABLET ORAL at 06:44

## 2017-08-22 RX ADMIN — TRIAMCINOLONE ACETONIDE: 1 CREAM TOPICAL at 17:53

## 2017-08-22 RX ADMIN — Medication 10 ML: at 06:00

## 2017-08-22 RX ADMIN — SUCRALFATE 1 G: 1 TABLET ORAL at 16:18

## 2017-08-22 RX ADMIN — METOPROLOL TARTRATE 25 MG: 25 TABLET ORAL at 21:01

## 2017-08-22 RX ADMIN — CLOPIDOGREL BISULFATE 75 MG: 75 TABLET ORAL at 08:39

## 2017-08-22 RX ADMIN — ACETYLCYSTEINE 600 MG: 200 SOLUTION ORAL; RESPIRATORY (INHALATION) at 22:07

## 2017-08-22 RX ADMIN — AMLODIPINE BESYLATE 10 MG: 5 TABLET ORAL at 16:17

## 2017-08-22 RX ADMIN — METOPROLOL TARTRATE 25 MG: 25 TABLET ORAL at 08:38

## 2017-08-22 RX ADMIN — DOCUSATE SODIUM 100 MG: 100 CAPSULE, LIQUID FILLED ORAL at 21:01

## 2017-08-22 RX ADMIN — Medication 10 ML: at 21:03

## 2017-08-22 RX ADMIN — SUCRALFATE 1 G: 1 TABLET ORAL at 21:01

## 2017-08-22 RX ADMIN — BICALUTAMIDE 50 MG: 50 TABLET, FILM COATED ORAL at 08:42

## 2017-08-22 RX ADMIN — OXYCODONE HYDROCHLORIDE AND ACETAMINOPHEN 1000 MG: 500 TABLET ORAL at 08:39

## 2017-08-22 RX ADMIN — TRIAMCINOLONE ACETONIDE: 1 CREAM TOPICAL at 08:40

## 2017-08-22 RX ADMIN — PANTOPRAZOLE SODIUM 40 MG: 40 TABLET, DELAYED RELEASE ORAL at 07:30

## 2017-08-22 RX ADMIN — SODIUM CHLORIDE 100 ML/HR: 900 INJECTION, SOLUTION INTRAVENOUS at 21:37

## 2017-08-22 NOTE — ACP (ADVANCE CARE PLANNING)
Visited with patient per in box request to assist with completion of Advanced Medical Directive (AMD). Explained document and its purpose to patient who indicated he wanted to wait for his wife and daughter to arrive to discuss and complete. Consulted with RN Jaimie Allen. Advised patient of  availability to assist with completion as needed and desired. Patient will have chaplains page if/when ready to complete.  Koffi Rinaldi M.Div.    Paging Service [phone])

## 2017-08-22 NOTE — PROGRESS NOTES
Problem: Unstable angina/NSTEMI: Day 2  Goal: Medications  Outcome: Progressing Towards Goal  Pt tolerated all medications given this evening  Goal: Respiratory  Outcome: Progressing Towards Goal  Pt on room air, O2 sats remain above 93%  Goal: *Optimal pain control at patients stated goal  Outcome: Progressing Towards Goal  Pt did not complain of pain  Goal: *Lungs clear or at baseline  Outcome: Progressing Towards Goal  Pt lungs are clear during shift

## 2017-08-22 NOTE — PROGRESS NOTES
8/22/2017     Admit Date: 8/19/2017    Admit Diagnosis: Unstable angina [facility])    Active Problems:    Unstable angina ([address] (8/19/2017)        Assessment/Plan:  1. Doing well post PCI. No more chest pain. 2. Moderate increase in Cr post contrast c/w KOJO superimposed on CKD  Plan;  1. Hydration   2. Renal ultrasound  3. Mucomyst  4. Repeat labs in AM and if stable discharge     Subjective:  Feels well this morning       Catie Juarez denies chest pain, chest pressure/discomfort. Objective:     Visit Vitals    /66 (BP 1 Location: Right arm, BP Patient Position: At rest)    Pulse (!) 59    Temp 98.3 °F (36.8 °C)    Resp 20    Ht 5' 6\" (1.676 m)    Wt 122.3 kg (269 lb 10 oz)    SpO2 96%    BMI 43.52 kg/m2        Physical Exam:  Neck: supple, symmetrical, trachea midline, no adenopathy, thyroid: not enlarged, symmetric, no tenderness/mass/nodules, no carotid bruit and no JVD  Heart: S1, S2 normal, S4 present, no rub  Lungs: clear to auscultation bilaterally, normal percussion bilaterally  Abdomen: soft, non-tender.  Bowel sounds normal. No masses,  no organomegaly  Extremities: extremities normal, atraumatic, no cyanosis or edema  Pulses: 2+ and symmetric  Neurologic: Grossly normal      Labs:    Recent Labs      08/21/17   0410  08/20/17   0442   CPK   --   105   CKMB   --   <1.0   TROIQ  <0.04  <0.04     Recent Labs      08/22/17   1043   NA  140   K  4.0   CL  108   BUN  19   CREA  1.45*   GLU  97   CA  9.0     Recent Labs      08/20/17   0442   WBC  4.5   HGB  12.9   HCT  39.5   PLT  184     Recent Labs      08/20/17   0442   CHOL  138   LDLC  68.2       Telemetry: normal sinus rhythm     Data Review: current meds, labs,recent radiology, intake/output/weight and problem list reviewed

## 2017-08-22 NOTE — PROGRESS NOTES
Spiritual Care Assessment/Progress Notes    Sylvain Dejesus [id number]  [id number]    10/30/1930  80 y.o.  male    Patient Telephone Number: [phone] (home)   Hoahaoism Affiliation: Other   Language: English   Extended Emergency Contact Information  Primary Emergency Contact: Kuldip West Phone: [phone]  Relation: Child   Patient Active Problem List    Diagnosis Date Noted    Unstable angina ([address] 08/19/2017        Date: 8/22/2017       Level of Hoahaoism/Spiritual Activity:  []         Involved in pat tradition/spiritual practice    []         Not involved in pat tradition/spiritual practice  []         Spiritually oriented    []         Claims no spiritual orientation    []         seeking spiritual identity  []         Feels alienated from Muslim practice/tradition  []         Feels angry about Muslim practice/tradition  [x]         Spirituality/Muslim tradition may be a resource for coping at this time.   [x]         Not able to assess     Services Provided Today:  []         crisis intervention    []         reading Scriptures  []         spiritual assessment    []         prayer  []         empathic listening/emotional support  []         rites and rituals (cite in comments)  []         life review     []         Muslim support  []         theological development   []         advocacy  []         ethical dialog     []         blessing  []         bereavement support    []         support to family  []         anticipatory grief support   [x]         help with AMD  []         spiritual guidance    []         meditation      Spiritual Care Needs  []         Emotional Support  []         Spiritual/Hoahaoism Care  []         Loss/Adjustment  []         Advocacy/Referral                /Ethics  [x]         No needs expressed at               this time  []         Other: (note in               comments)  [address]  []         Follow up visits with pt/family  []         Provide materials  []         Schedule sacraments  []         Contact Community               Clergy  [x]         Follow up as needed  []         Other: (note in               comments)     Comments: Visited with patient per in box request to assist with completion of Advanced Medical Directive (AMD). Explained document and its purpose to patient who indicated he wanted to wait for his wife and daughter to arrive to discuss and complete. Consulted with RN Edwin Guardado. Advised patient of  availability to assist with completion as needed and desired. Patient will have chaplains page if/when ready to complete. ABBIE FryDiv.    Paging Service [phone])

## 2017-08-22 NOTE — PROGRESS NOTES
1945: Bedside and Verbal shift change report given to [facility] (oncoming nurse) by Sherin Brown RN (offgoing nurse). Report included the following information SBAR, Kardex, Intake/Output, MAR, Accordion, Recent Results, Med Rec Status, Cardiac Rhythm NSR and Alarm Parameters . 0330: Patient refused lab draw    0800: Bedside and Verbal shift change report given to [facility] (oncoming nurse) by Brandon Ling (offgoing nurse). Report included the following information SBAR, Kardex, Intake/Output, MAR, Accordion, Recent Results, Med Rec Status, Cardiac Rhythm NSR and Alarm Parameters . Hourly rounds completed throughout shift. I have reviewed and agree with charting done by Gela Perez RN.

## 2017-08-22 NOTE — PROGRESS NOTES
Patient alone but has supportive family. Patient's emergency contact is his daughter, Hans Cantu (ph# [phone]). Patient's daughter & granddaughter will assist him with transportation home at the time of discharge. Patient's PCP is Dr. Kira Vale (ph# [phone]). Patient compliant in his physician appointments & his medication regimen. Patient can afford his medication. Patient verified that he has health insurance through [employer] & [email or web address]. Patient is retired. Will follow for discharge needs.

## 2017-08-22 NOTE — PROCEDURES
Cardiac Catheterization Procedure Note   Patient: Corrinne Malling  MRN: [id number]  SSN: [id number]   YOB: 1930 Age: 80 y.o.   Sex: male    Date of Procedure: 08/21/2017   Pre-procedure Diagnosis: Chest pain CCS Class III  Post-procedure Diagnosis: Coronary Artery Disease  Procedure: Left Heart Cath, PCI, to RCA and to LAD  :  Dr. Eyal Bravo MD    Assistant(s):  None  Anesthesia: Moderate Sedation   Estimated Blood Loss: Less than 10 mL   Specimens Removed: None  Findings: 85% discrete in-stent early mid LAD stenosis; 80% proximal native RCA stenosis; patent circumflex; normal LV EF @ 60-65%; mild to moderate elevation of LV filling pressure; POBA of LAD lesion with 3.0 balloon with an excellent angiographic result such that re-stenting was not necessary; 3.0 x 14 mm JENNY to proximal RCA with an excellent angiographic result  Complications: None   Implants:  None  Signed by:  Eyal Bravo MD  8/22/2017  2:42 PM

## 2017-08-23 LAB
ANION GAP SERPL CALC-SCNC: 6 MMOL/L (ref 5–15)
BUN SERPL-MCNC: 23 MG/DL (ref 6–20)
BUN/CREAT SERPL: 15 (ref 12–20)
CALCIUM SERPL-MCNC: 8.7 MG/DL (ref 8.5–10.1)
CHLORIDE SERPL-SCNC: 108 MMOL/L (ref 97–108)
CO2 SERPL-SCNC: 27 MMOL/L (ref 21–32)
CREAT SERPL-MCNC: 1.51 MG/DL (ref 0.7–1.3)
EST. AVERAGE GLUCOSE BLD GHB EST-MCNC: 131 MG/DL
GLUCOSE SERPL-MCNC: 112 MG/DL (ref 65–100)
HBA1C MFR BLD: 6.2 % (ref 4.2–6.3)
POTASSIUM SERPL-SCNC: 4.2 MMOL/L (ref 3.5–5.1)
SODIUM SERPL-SCNC: 141 MMOL/L (ref 136–145)

## 2017-08-23 PROCEDURE — 74011250636 HC RX REV CODE- 250/636: Performed by: INTERNAL MEDICINE

## 2017-08-23 PROCEDURE — 65660000000 HC RM CCU STEPDOWN

## 2017-08-23 PROCEDURE — 74011250637 HC RX REV CODE- 250/637: Performed by: INTERNAL MEDICINE

## 2017-08-23 PROCEDURE — 83036 HEMOGLOBIN GLYCOSYLATED A1C: CPT | Performed by: INTERNAL MEDICINE

## 2017-08-23 PROCEDURE — 80048 BASIC METABOLIC PNL TOTAL CA: CPT | Performed by: INTERNAL MEDICINE

## 2017-08-23 PROCEDURE — 36415 COLL VENOUS BLD VENIPUNCTURE: CPT | Performed by: INTERNAL MEDICINE

## 2017-08-23 PROCEDURE — 74011000250 HC RX REV CODE- 250: Performed by: INTERNAL MEDICINE

## 2017-08-23 RX ORDER — SODIUM CHLORIDE 9 MG/ML
75 INJECTION, SOLUTION INTRAVENOUS CONTINUOUS
Status: DISCONTINUED | OUTPATIENT
Start: 2017-08-23 | End: 2017-08-24 | Stop reason: HOSPADM

## 2017-08-23 RX ORDER — ACETYLCYSTEINE 200 MG/ML
600 SOLUTION ORAL; RESPIRATORY (INHALATION) 2 TIMES DAILY
Status: DISCONTINUED | OUTPATIENT
Start: 2017-08-23 | End: 2017-08-24 | Stop reason: HOSPADM

## 2017-08-23 RX ADMIN — METHOCARBAMOL 750 MG: 750 TABLET ORAL at 21:10

## 2017-08-23 RX ADMIN — METOPROLOL TARTRATE 25 MG: 25 TABLET ORAL at 21:10

## 2017-08-23 RX ADMIN — SODIUM CHLORIDE 100 ML/HR: 900 INJECTION, SOLUTION INTRAVENOUS at 06:59

## 2017-08-23 RX ADMIN — LISINOPRIL 40 MG: 20 TABLET ORAL at 17:11

## 2017-08-23 RX ADMIN — Medication 10 ML: at 06:59

## 2017-08-23 RX ADMIN — TRIAMCINOLONE ACETONIDE: 1 CREAM TOPICAL at 17:12

## 2017-08-23 RX ADMIN — PRAVASTATIN SODIUM 20 MG: 20 TABLET ORAL at 21:10

## 2017-08-23 RX ADMIN — METOPROLOL TARTRATE 25 MG: 25 TABLET ORAL at 08:25

## 2017-08-23 RX ADMIN — ACETYLCYSTEINE 600 MG: 200 SOLUTION ORAL; RESPIRATORY (INHALATION) at 17:53

## 2017-08-23 RX ADMIN — SIMETHICONE CHEW TAB 80 MG 80 MG: 80 TABLET ORAL at 08:25

## 2017-08-23 RX ADMIN — CLOPIDOGREL BISULFATE 75 MG: 75 TABLET ORAL at 08:25

## 2017-08-23 RX ADMIN — ASPIRIN 81 MG: 81 TABLET, COATED ORAL at 08:25

## 2017-08-23 RX ADMIN — SUCRALFATE 1 G: 1 TABLET ORAL at 06:58

## 2017-08-23 RX ADMIN — OXYCODONE HYDROCHLORIDE AND ACETAMINOPHEN 1000 MG: 500 TABLET ORAL at 08:25

## 2017-08-23 RX ADMIN — SUCRALFATE 1 G: 1 TABLET ORAL at 17:11

## 2017-08-23 RX ADMIN — DOCUSATE SODIUM 100 MG: 100 CAPSULE, LIQUID FILLED ORAL at 21:10

## 2017-08-23 RX ADMIN — TRIAMCINOLONE ACETONIDE: 1 CREAM TOPICAL at 08:28

## 2017-08-23 RX ADMIN — SUCRALFATE 1 G: 1 TABLET ORAL at 21:10

## 2017-08-23 RX ADMIN — AMLODIPINE BESYLATE 10 MG: 5 TABLET ORAL at 17:11

## 2017-08-23 RX ADMIN — POTASSIUM CHLORIDE 20 MEQ: 750 TABLET, FILM COATED, EXTENDED RELEASE ORAL at 17:11

## 2017-08-23 RX ADMIN — SUCRALFATE 1 G: 1 TABLET ORAL at 12:13

## 2017-08-23 RX ADMIN — Medication 10 ML: at 21:11

## 2017-08-23 RX ADMIN — PANTOPRAZOLE SODIUM 40 MG: 40 TABLET, DELAYED RELEASE ORAL at 06:58

## 2017-08-23 RX ADMIN — SODIUM CHLORIDE 75 ML/HR: 900 INJECTION, SOLUTION INTRAVENOUS at 09:20

## 2017-08-23 RX ADMIN — BICALUTAMIDE 50 MG: 50 TABLET, FILM COATED ORAL at 08:25

## 2017-08-23 NOTE — PROGRESS NOTES
8/23/2017     Admit Date: 8/19/2017    Admit Diagnosis: Unstable angina [facility])    Active Problems:    Unstable angina ([address] (8/19/2017)        Assessment/Plan:  1. Stable post PCI  2. Nonoliguric KOJO post cath: Cr eric again to 1.51 but rate of rise has slowed and has hopefully peaked  Plan:  1. Keep in house and continue hydration and mucomyst  2. Hopefully home tomorrow  3. Encouraged to keep ambulating     Subjective:  Feels fine       Jackson Lo denies chest pain, chest pressure/discomfort, dyspnea, palpitations, syncope, orthopnea, paroxysmal nocturnal dyspnea, exertional chest pressure/discomfort. Objective:     Visit Vitals    /58 (BP 1 Location: Right arm, BP Patient Position: Sitting)    Pulse 61    Temp 98.4 °F (36.9 °C)    Resp 19    Ht 5' 6\" (1.676 m)    Wt 121.1 kg (266 lb 15.6 oz)    SpO2 95%    BMI 43.09 kg/m2        Physical Exam:  Neck: supple, symmetrical, trachea midline, no adenopathy, thyroid: not enlarged, symmetric, no tenderness/mass/nodules, no carotid bruit and no JVD  Heart: regular rate and rhythm, S1, S2 normal  Lungs: clear to auscultation bilaterally, normal percussion bilaterally  Abdomen: soft, non-tender. Bowel sounds normal. No masses,  no organomegaly  Extremities: extremities normal, atraumatic, no cyanosis or edema  Pulses: 2+ and symmetric  Neurologic: Grossly normal      Labs:    Recent Labs      08/21/17   0410   TROIQ  <0.04     Recent Labs      08/23/17   0334   NA  141   K  4.2   CL  108   BUN  23*   CREA  1.51*   GLU  112*   CA  8.7     No results for input(s): WBC, HGB, HCT, PLT, HGBEXT, HCTEXT, PLTEXT in the last 72 hours. No results for input(s): CHOL, LDLC in the last 72 hours.     No lab exists for component: TGL, HDLC,  HBA1C    Telemetry: normal sinus rhythm     Data Review: current meds, labs,recent radiology, intake/output/weight and problem list reviewed

## 2017-08-23 NOTE — PROGRESS NOTES
Problem: Falls - Risk of  Goal: *Absence of Falls  Document J Luis Fall Risk and appropriate interventions in the flowsheet. Outcome: Progressing Towards Goal  Fall Risk Interventions:     Bed in low position. Locked bed wheels. Call bell and personal items within reach. Side rails up x3. Hourly rounding performed.   No falls during shift

## 2017-08-23 NOTE — PROGRESS NOTES
Bedside and Verbal shift change report given to Bhaskar Stallings RN (oncoming nurse) by Clare Goins RN (offgoing nurse). Report included the following information SBAR, Kardex, Intake/Output, MAR and Recent Results.

## 2017-08-23 NOTE — PROGRESS NOTES
Problem: Falls - Risk of  Goal: *Absence of Falls  Document J Luis Fall Risk and appropriate interventions in the flowsheet. Outcome: Progressing Towards Goal  Fall Risk Interventions:         No falls. Pt up ad stiven. Medication Interventions: Teach patient to arise slowly, Patient to call before getting OOB                       Problem: Pressure Injury - Risk of  Goal: *Prevention of pressure ulcer  Outcome: Progressing Towards Goal  No pressure ulcer or skin breakdown. Pt turns self. Problem: Unstable angina/NSTEMI: Day 2  Goal: *Hemodynamically stable  Outcome: Progressing Towards Goal  Pt is stable. Vital signs WNL. Goal: *Optimal pain control at patients stated goal  Outcome: Progressing Towards Goal  Pt does not complain of any pain. Just mild gas relieved with simethicone. Goal: *Lungs clear or at baseline  Outcome: Progressing Towards Goal  Lungs are clear bilaterally.

## 2017-08-23 NOTE — CARDIO/PULMONARY
Cardiac Wellness: Briefly, met with Дмитрий Naqvi. Reinforced the benefits of cardiac rehab and that his intake appointment is made and on his discharge instructions for when he is discharged. Дмитрий Naqvi verbalized understanding with questions answered.

## 2017-08-23 NOTE — PROGRESS NOTES
2000: Bedside and Verbal shift change report given to [address] [place] [address] (oncoming nurse) by Eli Partida RN (offgoing nurse). Report included the following information SBAR, Kardex, Intake/Output, MAR, Accordion, Recent Results, Med Rec Status, Cardiac Rhythm NSR and Alarm Parameters . Hourly rounds completed throughout shift. I have reviewed and agree with charting done by Cl Graves RN.

## 2017-08-24 VITALS
DIASTOLIC BLOOD PRESSURE: 53 MMHG | SYSTOLIC BLOOD PRESSURE: 128 MMHG | HEIGHT: 66 IN | OXYGEN SATURATION: 93 % | WEIGHT: 269.84 LBS | BODY MASS INDEX: 43.37 KG/M2 | TEMPERATURE: 97.8 F | RESPIRATION RATE: 20 BRPM | HEART RATE: 55 BPM

## 2017-08-24 LAB
ANION GAP SERPL CALC-SCNC: 6 MMOL/L (ref 5–15)
BUN SERPL-MCNC: 21 MG/DL (ref 6–20)
BUN/CREAT SERPL: 15 (ref 12–20)
CALCIUM SERPL-MCNC: 8.9 MG/DL (ref 8.5–10.1)
CHLORIDE SERPL-SCNC: 109 MMOL/L (ref 97–108)
CO2 SERPL-SCNC: 27 MMOL/L (ref 21–32)
CREAT SERPL-MCNC: 1.4 MG/DL (ref 0.7–1.3)
GLUCOSE SERPL-MCNC: 112 MG/DL (ref 65–100)
POTASSIUM SERPL-SCNC: 4.3 MMOL/L (ref 3.5–5.1)
SODIUM SERPL-SCNC: 142 MMOL/L (ref 136–145)

## 2017-08-24 PROCEDURE — 74011250637 HC RX REV CODE- 250/637: Performed by: INTERNAL MEDICINE

## 2017-08-24 PROCEDURE — 80048 BASIC METABOLIC PNL TOTAL CA: CPT | Performed by: INTERNAL MEDICINE

## 2017-08-24 PROCEDURE — 36415 COLL VENOUS BLD VENIPUNCTURE: CPT | Performed by: INTERNAL MEDICINE

## 2017-08-24 PROCEDURE — 74011250636 HC RX REV CODE- 250/636: Performed by: INTERNAL MEDICINE

## 2017-08-24 RX ORDER — METOPROLOL TARTRATE 25 MG/1
12.5 TABLET, FILM COATED ORAL DAILY
Qty: 30 TAB | Refills: 3 | Status: ON HOLD
Start: 2017-08-24 | End: 2022-06-30 | Stop reason: SDUPTHER

## 2017-08-24 RX ORDER — CLOPIDOGREL BISULFATE 75 MG/1
75 TABLET ORAL DAILY
Qty: 90 TAB | Refills: 3 | Status: SHIPPED | OUTPATIENT
Start: 2017-08-24

## 2017-08-24 RX ADMIN — BICALUTAMIDE 50 MG: 50 TABLET, FILM COATED ORAL at 09:06

## 2017-08-24 RX ADMIN — CLOPIDOGREL BISULFATE 75 MG: 75 TABLET ORAL at 09:06

## 2017-08-24 RX ADMIN — SODIUM CHLORIDE 75 ML/HR: 900 INJECTION, SOLUTION INTRAVENOUS at 04:21

## 2017-08-24 RX ADMIN — SUCRALFATE 1 G: 1 TABLET ORAL at 07:28

## 2017-08-24 RX ADMIN — METOPROLOL TARTRATE 25 MG: 25 TABLET ORAL at 09:06

## 2017-08-24 RX ADMIN — OXYCODONE HYDROCHLORIDE AND ACETAMINOPHEN 1000 MG: 500 TABLET ORAL at 09:06

## 2017-08-24 RX ADMIN — PANTOPRAZOLE SODIUM 40 MG: 40 TABLET, DELAYED RELEASE ORAL at 07:29

## 2017-08-24 RX ADMIN — ASPIRIN 81 MG: 81 TABLET, COATED ORAL at 09:06

## 2017-08-24 NOTE — PROGRESS NOTES
Problem: Falls - Risk of  Goal: *Absence of Falls  Document J Luis Fall Risk and appropriate interventions in the flowsheet.    Outcome: Progressing Towards Goal  Fall Risk Interventions:              Medication Interventions: Patient to call before getting OOB           History of Falls Interventions: Door open when patient unattended, Room close to nurse's station

## 2017-08-24 NOTE — DISCHARGE INSTRUCTIONS
Patient Discharge Instructions    Margaret Banerjee / [id number] : 10/30/1930    Admitted 2017 Discharged: 2017     Take Home Medications            · It is important that you take the medication exactly as they are prescribed. · Keep your medication in the bottles provided by the pharmacist and keep a list of the medication names, dosages, and times to be taken in your wallet. · Do not take other medications without consulting your doctor. · You must take aspirin and clopidogrel every day to keep your new stent from clotting  · Do not take metformin until you see your PCP since you had a problem with your kidneys. You may need to take a different medication for your diabetes    BRING ALL OF YOUR MEDICINES TO YOUR OFFICE VISIT with Dr. Jeffrey Cruz. Follow-up with Gabriel Pringle MD in 3 weeks. Call [phone] to arrange. Cardiac Catheterization  Discharge Instructions     Do not drive, operate any machinery, or sign any legal documents for 24 hours after your procedure. You must have someone to drive you home.  You may take a shower 24 hours after your cardiac catheterization. Be sure to get the dressing wet and then remove it; gently wash the area with warm soapy water. Pat dry and leave open to air. To help prevent infections, be sure to keep the cath site clean and dry. No lotions, creams, powders, ointments, etc. in the cath site for approximately 1 week.  Do not take a tub bath, get in a hot tub or swimming pool for approximately 5 days or until the cath site is completely healed.  No strenuous activity or heavy lifting over 10 lbs. for 7 days.  Drink plenty of fluids for 24-48 hours after your cath to flush the contrast dye from your kidneys. No alcoholic beverages for 24 hours. You may resume your previous diet (low fat, low cholesterol) after your cath.  After your cath, some bruising or discomfort is common during the healing process.   Tylenol, 1-2 tablets every 6 hours as needed, is recommended if you experience any discomfort. If you experience any signs or symptoms of infection such as fever, chills, or poorly healing incision, persistent tenderness or swelling in the groin, redness and/or warmth to the touch, numbness, significant tingling or pain at the groin site or affected extremity, rash, drainage from the cath site, or if the leg feels tight or swollen, call your physician right away.  If bleeding at the cath site occurs, take a clean gauze pad and apply direct pressure to the groin just above the puncture site. Call 911 immediately, and continue to apply direct pressure until an ambulance gets to your location.  You may return to work  2  days after your cardiac cath if no groin bleeding. Information obtained by :  I understand that if any problems occur once I am at home I am to contact my physician. I understand and acknowledge receipt of the instructions indicated above.                                                                                                                                            R.N.'s Signature                                                                  Date/Time                                                                                                                                              Patient or Representative Signature                                                          Date/Time      Pau Roldan MD

## 2017-08-24 NOTE — ROUTINE PROCESS
Bedside shift change report given to kirstian JEAN (oncoming nurse) by Saintclair Reilly (offgoing nurse). Report included the following information SBAR, Kardex, ED Summary, MAR, Recent Results and Cardiac Rhythm nsr.

## 2017-08-24 NOTE — PROGRESS NOTES
2345: Bedside and Verbal shift change report given to [address] [place] [address] (oncoming nurse) by Abad Marcos RN (offgoing nurse). Report included the following information SBAR, Kardex, Intake/Output, MAR, Accordion, Recent Results, Med Rec Status, Cardiac Rhythm NSR and Alarm Parameters . 0740: Bedside and Verbal shift change report given to [address] (oncoming nurse) by Tay Lerner RN/Tahira JEAN (offgoing nurse). Report included the following information SBAR, Kardex, Intake/Output, MAR, Accordion, Recent Results, Med Rec Status, Cardiac Rhythm NSR and Alarm Parameters . Hourly rounds completed throughout shift. I have reviewed and agree with charting done by Anastacio Mckeon RN.

## 2017-08-24 NOTE — PROGRESS NOTES
Problem: Falls - Risk of  Goal: *Absence of Falls  Document J Luis Fall Risk and appropriate interventions in the flowsheet. Outcome: Progressing Towards Goal  Fall Risk Interventions:     Pt has not fallen. Call bell and belongings in reach, hourly rounds performed. Medication Interventions: Teach patient to arise slowly, Patient to call before getting OOB            Problem: Unstable angina/NSTEMI: Day of Admission/Day 1  Goal: *Optimal pain control at patients stated goal  Outcome: Progressing Towards Goal  Pt complains of no chest pain, or any other kind. 2000  Bedside and Verbal shift change report given to Jed Bashir RN (oncoming nurse) by Edith Hewitt (offgoing nurse). Report included the following information SBAR, ED Summary, Procedure Summary, Intake/Output, MAR, Recent Results and Cardiac Rhythm nsr. Hourly rounds performed.

## 2017-08-24 NOTE — PROGRESS NOTES
I have reviewed discharge instructions with the patient. The patient verbalized understanding. Opportunity for questions given. RN removed patient PIV and telemetry monitoring. Medication education given and reviewed with patient regarding new medications at discharge. Follow up appt made with cardiology. Patient awaiting ride for discharge home. Post cath instructions discussed.

## 2017-08-25 NOTE — DISCHARGE SUMMARY
[address] [place] [address] SUMMARY       Name:  Andrew Kaufman   MR#:  [id number]   :  10/30/1930   Account #:  [de-identified]        Date of Adm:  2017       DIAGNOSES   1. Acute coronary syndrome and unstable angina. 2. Coronary artery disease with disease progression requiring   percutaneous coronary intervention. 3. Chronic kidney disease, stage III. 4. Acute kidney injury, resolving. 5. Hypertension. 6. Diabetes mellitus, on oral agents. HOSPITAL COURSE: The patient was admitted to the hospital with   somewhat atypical chest pain. Cardiac enzymes remained negative. He was given medication over the weekend and remained stable. He   had coronary angiography on Monday, 2017. He was found to   have high-grade focal in-stent restenosis in the LAD and proximal   native right coronary artery stenosis. The LAD lesion was treated with   balloon angioplasty only, with an excellent angiographic result. The   proximal right coronary artery was treated with a drug-eluting stent,   with an excellent angiographic result. The patient did well with no   complications during the procedure. Discharge has been delayed with   moderate rise in his creatinine from his baseline. His creatinine peaked   at 1.5 yesterday, it is now 1.40 today. He has been ambulating for the   last 3 days without difficulty and no chest pain. Otherwise, feeling well. His physical exam is stable and unchanged from admission. MEDICATIONS   1. Clopidogrel 75 mg daily. 2. Metoprolol tartrate, will change, will be 12.5 mg daily. 3. Amlodipine 10 mg daily. 4. Aspirin 81 mg daily. 5. Betamethasone topical cream as directed. 6. Bicalutamide 50 mg daily. 7. Colace 100 mg every night. 8. Flonase nasal spray as directed. 9. Lisinopril 40 mg daily. 10. Loratadine 10 mg daily. 11. Methocarbamol 750 nightly. 12. Pravastatin 20 mg daily.    13. Vitamin C 1000 mg daily.   14. He will stop furosemide. 15. He will stop metformin. 16. He will stop potassium. FOLLOWUP: The patient will follow up with me in 3 weeks. DISCHARGE INSTRUCTIONS   1. He has been instructed to follow up with his primary care physician   over the next week to see if he can resume his metformin or if another   agent might be appropriate. 2. He will be advised specifically that he must take dual-antiplatelet   therapy with a new drug-eluting stent to prevent thrombosis. 3. He will be advised to weigh himself daily, and if his weight starts to   go up, he will start back on half of his usual furosemide dose.         Venkat Mccauley MD      [facility]   D:  08/24/2017   07:29   T:  08/24/2017   08:24   Job #:  [id number]

## 2017-09-08 ENCOUNTER — TELEPHONE (OUTPATIENT)
Dept: CARDIAC REHAB | Age: 82
End: 2017-09-08

## 2017-09-08 NOTE — TELEPHONE ENCOUNTER
9/8/2017 Cardiac Wellness: Called Mr. Mary Duenas to remind of intake appointment on Monday, September 11, 2017. Confirmed appointment with patient. Provided pt with contact information for [facility]. Also, reminded pt to bring a list of current medications, a personal schedule, and to wear comfortable clothes and shoes.  Odalis Jim

## 2017-09-11 ENCOUNTER — HOSPITAL ENCOUNTER (OUTPATIENT)
Dept: CARDIAC REHAB | Age: 82
Discharge: HOME | End: 2017-09-11
Payer: MEDICARE

## 2017-09-11 VITALS — WEIGHT: 270 LBS | BODY MASS INDEX: 43.39 KG/M2 | HEIGHT: 66 IN

## 2017-09-11 VITALS — BODY MASS INDEX: 43.58 KG/M2 | WEIGHT: 270 LBS

## 2017-09-11 PROCEDURE — 93798 PHYS/QHP OP CAR RHAB W/ECG: CPT

## 2017-09-11 NOTE — CARDIO/PULMONARY
Tristan Frank  80 y.o. presented to cardiac wellness for orientation and exercise tolerance test today with a primary diagnosis of PCI and stent 08/21/17. His EF is 65-70%. Tristan Frank has ca cardiac history of MI and CABG in 2000 and PCI in 2001. Cardiac risk factors include family history, dyslipidemia, diabetes mellitus, obesity, hypertension, prior MI, inactivity and these were reviewed with Shae Suarez and his wife. Tristan Frank lives with his supportive wife and they have grown children. He is a retired from the state and worked in . He now works part time as a . GALA-D, depression score, is 9 and this is considered normal.  Patient denied chest pain or SOB during 6 minute walk and was in SR with PACs. Tristan Frank will attend a 60 minute class once a week and exercise 2 days a week in cardiac wellness.   Marlen Hawkins RN  9/11/2017

## 2017-09-12 ENCOUNTER — APPOINTMENT (OUTPATIENT)
Dept: CARDIAC REHAB | Age: 82
End: 2017-09-12
Payer: MEDICARE

## 2017-09-14 ENCOUNTER — HOSPITAL ENCOUNTER (OUTPATIENT)
Dept: CARDIAC REHAB | Age: 82
Discharge: HOME | End: 2017-09-14
Payer: MEDICARE

## 2017-09-14 VITALS — WEIGHT: 266 LBS | BODY MASS INDEX: 42.93 KG/M2

## 2017-09-14 PROCEDURE — 93798 PHYS/QHP OP CAR RHAB W/ECG: CPT

## 2017-09-19 ENCOUNTER — HOSPITAL ENCOUNTER (OUTPATIENT)
Dept: CARDIAC REHAB | Age: 82
Discharge: HOME | End: 2017-09-19
Payer: MEDICARE

## 2017-09-19 VITALS — WEIGHT: 268 LBS | BODY MASS INDEX: 43.26 KG/M2

## 2017-09-19 PROCEDURE — 93797 PHYS/QHP OP CAR RHAB WO ECG: CPT

## 2017-09-19 PROCEDURE — 93798 PHYS/QHP OP CAR RHAB W/ECG: CPT

## 2017-09-21 ENCOUNTER — HOSPITAL ENCOUNTER (OUTPATIENT)
Dept: CARDIAC REHAB | Age: 82
Discharge: HOME | End: 2017-09-21
Payer: MEDICARE

## 2017-09-21 ENCOUNTER — APPOINTMENT (OUTPATIENT)
Dept: CARDIAC REHAB | Age: 82
End: 2017-09-21
Payer: MEDICARE

## 2017-09-26 ENCOUNTER — HOSPITAL ENCOUNTER (OUTPATIENT)
Dept: CARDIAC REHAB | Age: 82
Discharge: HOME | End: 2017-09-26
Payer: MEDICARE

## 2017-09-26 VITALS — WEIGHT: 265 LBS | BODY MASS INDEX: 42.77 KG/M2

## 2017-09-26 PROCEDURE — 93797 PHYS/QHP OP CAR RHAB WO ECG: CPT

## 2017-09-26 PROCEDURE — 93798 PHYS/QHP OP CAR RHAB W/ECG: CPT | Performed by: DIETITIAN, REGISTERED

## 2017-09-28 ENCOUNTER — APPOINTMENT (OUTPATIENT)
Dept: CARDIAC REHAB | Age: 82
End: 2017-09-28
Payer: MEDICARE

## 2017-09-29 ENCOUNTER — HOSPITAL ENCOUNTER (OUTPATIENT)
Dept: CARDIAC REHAB | Age: 82
Discharge: HOME | End: 2017-09-29
Payer: MEDICARE

## 2017-09-29 VITALS — WEIGHT: 266 LBS | BODY MASS INDEX: 42.93 KG/M2

## 2017-09-29 PROCEDURE — 93798 PHYS/QHP OP CAR RHAB W/ECG: CPT

## 2017-10-03 ENCOUNTER — HOSPITAL ENCOUNTER (OUTPATIENT)
Dept: CARDIAC REHAB | Age: 82
Discharge: HOME | End: 2017-10-03
Payer: MEDICARE

## 2017-10-03 VITALS — WEIGHT: 261 LBS | BODY MASS INDEX: 42.13 KG/M2

## 2017-10-03 PROCEDURE — 93798 PHYS/QHP OP CAR RHAB W/ECG: CPT | Performed by: DIETITIAN, REGISTERED

## 2017-10-03 PROCEDURE — 93797 PHYS/QHP OP CAR RHAB WO ECG: CPT

## 2017-10-05 ENCOUNTER — HOSPITAL ENCOUNTER (OUTPATIENT)
Dept: CARDIAC REHAB | Age: 82
Discharge: HOME | End: 2017-10-05
Payer: MEDICARE

## 2017-10-05 VITALS — WEIGHT: 262 LBS | BODY MASS INDEX: 42.29 KG/M2

## 2017-10-05 PROCEDURE — 93798 PHYS/QHP OP CAR RHAB W/ECG: CPT

## 2017-10-10 ENCOUNTER — HOSPITAL ENCOUNTER (OUTPATIENT)
Dept: CARDIAC REHAB | Age: 82
Discharge: HOME | End: 2017-10-10
Payer: MEDICARE

## 2017-10-10 VITALS — BODY MASS INDEX: 41.8 KG/M2 | WEIGHT: 259 LBS

## 2017-10-10 PROCEDURE — 93798 PHYS/QHP OP CAR RHAB W/ECG: CPT | Performed by: DIETITIAN, REGISTERED

## 2017-10-10 PROCEDURE — 93797 PHYS/QHP OP CAR RHAB WO ECG: CPT | Performed by: DIETITIAN, REGISTERED

## 2017-10-12 ENCOUNTER — APPOINTMENT (OUTPATIENT)
Dept: CARDIAC REHAB | Age: 82
End: 2017-10-12
Payer: MEDICARE

## 2017-10-12 ENCOUNTER — HOSPITAL ENCOUNTER (OUTPATIENT)
Dept: CARDIAC REHAB | Age: 82
Discharge: HOME | End: 2017-10-12
Payer: MEDICARE

## 2017-10-12 VITALS — BODY MASS INDEX: 41 KG/M2 | WEIGHT: 254 LBS

## 2017-10-12 PROCEDURE — 93798 PHYS/QHP OP CAR RHAB W/ECG: CPT

## 2017-10-17 ENCOUNTER — HOSPITAL ENCOUNTER (OUTPATIENT)
Dept: CARDIAC REHAB | Age: 82
Discharge: HOME | End: 2017-10-17
Payer: MEDICARE

## 2017-10-17 VITALS — WEIGHT: 258 LBS | BODY MASS INDEX: 41.64 KG/M2

## 2017-10-17 PROCEDURE — 93798 PHYS/QHP OP CAR RHAB W/ECG: CPT

## 2017-10-17 PROCEDURE — 93797 PHYS/QHP OP CAR RHAB WO ECG: CPT

## 2017-10-19 ENCOUNTER — HOSPITAL ENCOUNTER (OUTPATIENT)
Dept: CARDIAC REHAB | Age: 82
Discharge: HOME | End: 2017-10-19
Payer: MEDICARE

## 2017-10-19 VITALS — WEIGHT: 258 LBS | BODY MASS INDEX: 41.64 KG/M2

## 2017-10-19 PROCEDURE — 93798 PHYS/QHP OP CAR RHAB W/ECG: CPT

## 2017-10-24 ENCOUNTER — HOSPITAL ENCOUNTER (OUTPATIENT)
Dept: CARDIAC REHAB | Age: 82
Discharge: HOME | End: 2017-10-24
Payer: MEDICARE

## 2017-10-24 VITALS — BODY MASS INDEX: 41.32 KG/M2 | WEIGHT: 256 LBS

## 2017-10-24 PROCEDURE — 93797 PHYS/QHP OP CAR RHAB WO ECG: CPT

## 2017-10-24 PROCEDURE — 93798 PHYS/QHP OP CAR RHAB W/ECG: CPT | Performed by: DIETITIAN, REGISTERED

## 2017-10-26 ENCOUNTER — HOSPITAL ENCOUNTER (OUTPATIENT)
Dept: CARDIAC REHAB | Age: 82
Discharge: HOME | End: 2017-10-26
Payer: MEDICARE

## 2017-10-26 VITALS — BODY MASS INDEX: 41.32 KG/M2 | WEIGHT: 256 LBS

## 2017-10-26 PROCEDURE — 93798 PHYS/QHP OP CAR RHAB W/ECG: CPT

## 2017-10-31 ENCOUNTER — APPOINTMENT (OUTPATIENT)
Dept: CARDIAC REHAB | Age: 82
End: 2017-10-31
Payer: MEDICARE

## 2017-11-07 ENCOUNTER — HOSPITAL ENCOUNTER (OUTPATIENT)
Dept: CARDIAC REHAB | Age: 82
Discharge: HOME | End: 2017-11-07
Payer: MEDICARE

## 2017-11-07 VITALS — BODY MASS INDEX: 41.08 KG/M2 | WEIGHT: 254.5 LBS

## 2017-11-07 PROCEDURE — 93798 PHYS/QHP OP CAR RHAB W/ECG: CPT | Performed by: DIETITIAN, REGISTERED

## 2017-11-07 PROCEDURE — 93797 PHYS/QHP OP CAR RHAB WO ECG: CPT | Performed by: DIETITIAN, REGISTERED

## 2017-11-09 ENCOUNTER — HOSPITAL ENCOUNTER (OUTPATIENT)
Dept: CARDIAC REHAB | Age: 82
Discharge: HOME | End: 2017-11-09
Payer: MEDICARE

## 2017-11-09 VITALS — BODY MASS INDEX: 41 KG/M2 | WEIGHT: 254 LBS

## 2017-11-09 PROCEDURE — 93798 PHYS/QHP OP CAR RHAB W/ECG: CPT

## 2017-11-14 ENCOUNTER — HOSPITAL ENCOUNTER (OUTPATIENT)
Dept: CARDIAC REHAB | Age: 82
Discharge: HOME | End: 2017-11-14
Payer: MEDICARE

## 2017-11-14 VITALS — WEIGHT: 253 LBS | BODY MASS INDEX: 40.84 KG/M2

## 2017-11-14 PROCEDURE — 93797 PHYS/QHP OP CAR RHAB WO ECG: CPT | Performed by: DIETITIAN, REGISTERED

## 2017-11-14 PROCEDURE — 93798 PHYS/QHP OP CAR RHAB W/ECG: CPT | Performed by: DIETITIAN, REGISTERED

## 2017-11-16 ENCOUNTER — HOSPITAL ENCOUNTER (OUTPATIENT)
Dept: CARDIAC REHAB | Age: 82
Discharge: HOME | End: 2017-11-16
Payer: MEDICARE

## 2017-11-16 VITALS — BODY MASS INDEX: 40.84 KG/M2 | WEIGHT: 253 LBS

## 2017-11-16 PROCEDURE — 93798 PHYS/QHP OP CAR RHAB W/ECG: CPT

## 2017-11-21 ENCOUNTER — HOSPITAL ENCOUNTER (OUTPATIENT)
Dept: CARDIAC REHAB | Age: 82
Discharge: HOME | End: 2017-11-21
Payer: MEDICARE

## 2017-11-21 VITALS — WEIGHT: 251 LBS | BODY MASS INDEX: 40.51 KG/M2

## 2017-11-21 PROCEDURE — 93797 PHYS/QHP OP CAR RHAB WO ECG: CPT | Performed by: DIETITIAN, REGISTERED

## 2017-11-21 PROCEDURE — 93798 PHYS/QHP OP CAR RHAB W/ECG: CPT | Performed by: DIETITIAN, REGISTERED

## 2017-11-28 ENCOUNTER — HOSPITAL ENCOUNTER (OUTPATIENT)
Dept: CARDIAC REHAB | Age: 82
Discharge: HOME | End: 2017-11-28
Payer: MEDICARE

## 2017-11-28 VITALS — BODY MASS INDEX: 40.67 KG/M2 | WEIGHT: 252 LBS

## 2017-11-28 PROCEDURE — 93797 PHYS/QHP OP CAR RHAB WO ECG: CPT | Performed by: DIETITIAN, REGISTERED

## 2017-11-28 PROCEDURE — 93798 PHYS/QHP OP CAR RHAB W/ECG: CPT | Performed by: DIETITIAN, REGISTERED

## 2017-12-05 ENCOUNTER — HOSPITAL ENCOUNTER (OUTPATIENT)
Dept: CARDIAC REHAB | Age: 82
Discharge: HOME | End: 2017-12-05
Payer: MEDICARE

## 2017-12-05 VITALS — WEIGHT: 251 LBS | BODY MASS INDEX: 40.51 KG/M2

## 2017-12-05 PROCEDURE — 93798 PHYS/QHP OP CAR RHAB W/ECG: CPT | Performed by: DIETITIAN, REGISTERED

## 2017-12-05 PROCEDURE — 93797 PHYS/QHP OP CAR RHAB WO ECG: CPT | Performed by: DIETITIAN, REGISTERED

## 2017-12-07 ENCOUNTER — HOSPITAL ENCOUNTER (OUTPATIENT)
Dept: CARDIAC REHAB | Age: 82
Discharge: HOME | End: 2017-12-07
Payer: MEDICARE

## 2017-12-07 VITALS — BODY MASS INDEX: 40.35 KG/M2 | WEIGHT: 250 LBS

## 2017-12-07 PROCEDURE — 93798 PHYS/QHP OP CAR RHAB W/ECG: CPT

## 2017-12-12 ENCOUNTER — APPOINTMENT (OUTPATIENT)
Dept: CARDIAC REHAB | Age: 82
End: 2017-12-12
Payer: MEDICARE

## 2017-12-12 ENCOUNTER — HOSPITAL ENCOUNTER (OUTPATIENT)
Dept: CARDIAC REHAB | Age: 82
Discharge: HOME | End: 2017-12-12
Payer: MEDICARE

## 2017-12-12 VITALS — BODY MASS INDEX: 40.35 KG/M2 | WEIGHT: 250 LBS

## 2017-12-12 PROCEDURE — 93798 PHYS/QHP OP CAR RHAB W/ECG: CPT | Performed by: DIETITIAN, REGISTERED

## 2017-12-14 ENCOUNTER — HOSPITAL ENCOUNTER (OUTPATIENT)
Dept: CARDIAC REHAB | Age: 82
Discharge: HOME | End: 2017-12-14
Payer: MEDICARE

## 2017-12-14 VITALS — WEIGHT: 251 LBS | BODY MASS INDEX: 40.51 KG/M2

## 2017-12-14 PROCEDURE — 93798 PHYS/QHP OP CAR RHAB W/ECG: CPT

## 2017-12-19 ENCOUNTER — HOSPITAL ENCOUNTER (OUTPATIENT)
Dept: CARDIAC REHAB | Age: 82
Discharge: HOME | End: 2017-12-19
Payer: MEDICARE

## 2017-12-19 VITALS — BODY MASS INDEX: 40.51 KG/M2 | WEIGHT: 251 LBS

## 2017-12-19 PROCEDURE — 93798 PHYS/QHP OP CAR RHAB W/ECG: CPT | Performed by: DIETITIAN, REGISTERED

## 2017-12-21 ENCOUNTER — TELEPHONE (OUTPATIENT)
Dept: CARDIAC REHAB | Age: 82
End: 2017-12-21

## 2017-12-21 ENCOUNTER — APPOINTMENT (OUTPATIENT)
Dept: CARDIAC REHAB | Age: 82
End: 2017-12-21
Payer: MEDICARE

## 2017-12-21 NOTE — TELEPHONE ENCOUNTER
12/21/2017 Cardiac Wellness: Mr. Pastora Hardy called to cancel today's appointment d/t sick. Will return for next appointment.  Scott Mena

## 2017-12-29 ENCOUNTER — TELEPHONE (OUTPATIENT)
Dept: CARDIAC REHAB | Age: 82
End: 2017-12-29

## 2017-12-29 NOTE — TELEPHONE ENCOUNTER
12/29/2017 Cardiac Wellness: Called Mr. Hinojosa Falling  to cancel Tuesday's (1/2/18) appointment d/t we are  closed. Will return for next appointment.    Giancarlo More

## 2018-01-02 ENCOUNTER — APPOINTMENT (OUTPATIENT)
Dept: CARDIAC REHAB | Age: 83
End: 2018-01-02
Payer: MEDICARE

## 2018-01-09 ENCOUNTER — HOSPITAL ENCOUNTER (OUTPATIENT)
Dept: CARDIAC REHAB | Age: 83
Discharge: HOME | End: 2018-01-09
Payer: MEDICARE

## 2018-01-09 VITALS — WEIGHT: 250 LBS | BODY MASS INDEX: 40.35 KG/M2

## 2018-01-11 ENCOUNTER — HOSPITAL ENCOUNTER (OUTPATIENT)
Dept: CARDIAC REHAB | Age: 83
Discharge: HOME | End: 2018-01-11
Payer: MEDICARE

## 2018-01-11 VITALS — WEIGHT: 250 LBS | BODY MASS INDEX: 40.35 KG/M2

## 2018-01-11 PROCEDURE — 93798 PHYS/QHP OP CAR RHAB W/ECG: CPT

## 2018-01-16 ENCOUNTER — TELEPHONE (OUTPATIENT)
Dept: CARDIAC REHAB | Age: 83
End: 2018-01-16

## 2018-01-16 ENCOUNTER — APPOINTMENT (OUTPATIENT)
Dept: CARDIAC REHAB | Age: 83
End: 2018-01-16
Payer: MEDICARE

## 2018-01-16 NOTE — TELEPHONE ENCOUNTER
1/16/2018 Cardiac Wellness: Mr. Sumeet Payne called to cancel today's appointment d/t personal. Will return for next appointment.  Nancy Olson

## 2018-01-18 ENCOUNTER — HOSPITAL ENCOUNTER (OUTPATIENT)
Dept: CARDIAC REHAB | Age: 83
Discharge: HOME | End: 2018-01-18
Payer: MEDICARE

## 2018-01-18 VITALS — WEIGHT: 250 LBS | BODY MASS INDEX: 40.35 KG/M2

## 2018-01-23 ENCOUNTER — HOSPITAL ENCOUNTER (OUTPATIENT)
Dept: CARDIAC REHAB | Age: 83
Discharge: HOME | End: 2018-01-23
Payer: MEDICARE

## 2018-01-23 VITALS — BODY MASS INDEX: 39.87 KG/M2 | WEIGHT: 247 LBS

## 2018-01-30 ENCOUNTER — HOSPITAL ENCOUNTER (OUTPATIENT)
Dept: CARDIAC REHAB | Age: 83
Discharge: HOME | End: 2018-01-30
Payer: MEDICARE

## 2018-01-30 VITALS — WEIGHT: 251 LBS | BODY MASS INDEX: 40.51 KG/M2

## 2018-02-01 ENCOUNTER — APPOINTMENT (OUTPATIENT)
Dept: CARDIAC REHAB | Age: 83
End: 2018-02-01

## 2018-02-06 ENCOUNTER — HOSPITAL ENCOUNTER (OUTPATIENT)
Dept: CARDIAC REHAB | Age: 83
Discharge: HOME | End: 2018-02-06

## 2018-02-06 ENCOUNTER — APPOINTMENT (OUTPATIENT)
Dept: CARDIAC REHAB | Age: 83
End: 2018-02-06

## 2018-02-06 VITALS — BODY MASS INDEX: 40.35 KG/M2 | WEIGHT: 250 LBS

## 2018-02-13 ENCOUNTER — HOSPITAL ENCOUNTER (OUTPATIENT)
Dept: CARDIAC REHAB | Age: 83
Discharge: HOME | End: 2018-02-13

## 2018-02-13 VITALS — WEIGHT: 252 LBS | BODY MASS INDEX: 40.67 KG/M2

## 2018-02-27 ENCOUNTER — HOSPITAL ENCOUNTER (OUTPATIENT)
Dept: CARDIAC REHAB | Age: 83
Discharge: HOME | End: 2018-02-27

## 2018-02-27 VITALS — BODY MASS INDEX: 41 KG/M2 | WEIGHT: 254 LBS

## 2018-03-06 ENCOUNTER — HOSPITAL ENCOUNTER (OUTPATIENT)
Dept: CARDIAC REHAB | Age: 83
Discharge: HOME | End: 2018-03-06
Payer: MEDICARE

## 2018-03-13 ENCOUNTER — HOSPITAL ENCOUNTER (OUTPATIENT)
Dept: CARDIAC REHAB | Age: 83
Discharge: HOME | End: 2018-03-13
Payer: MEDICARE

## 2018-03-13 VITALS — WEIGHT: 251 LBS | BODY MASS INDEX: 40.51 KG/M2

## 2018-03-20 ENCOUNTER — HOSPITAL ENCOUNTER (OUTPATIENT)
Dept: CARDIAC REHAB | Age: 83
Discharge: HOME | End: 2018-03-20
Payer: MEDICARE

## 2018-03-20 VITALS — WEIGHT: 250 LBS | BODY MASS INDEX: 40.35 KG/M2

## 2018-03-27 ENCOUNTER — HOSPITAL ENCOUNTER (OUTPATIENT)
Dept: CARDIAC REHAB | Age: 83
Discharge: HOME | End: 2018-03-27
Payer: MEDICARE

## 2018-03-27 VITALS — BODY MASS INDEX: 40.35 KG/M2 | WEIGHT: 250 LBS

## 2018-03-27 PROCEDURE — 93798 PHYS/QHP OP CAR RHAB W/ECG: CPT | Performed by: DIETITIAN, REGISTERED

## 2018-03-27 PROCEDURE — 93798 PHYS/QHP OP CAR RHAB W/ECG: CPT

## 2018-04-04 ENCOUNTER — OFFICE VISIT (OUTPATIENT)
Dept: URGENT CARE | Age: 83
End: 2018-04-04

## 2018-04-04 VITALS
HEIGHT: 66 IN | WEIGHT: 253 LBS | BODY MASS INDEX: 40.66 KG/M2 | DIASTOLIC BLOOD PRESSURE: 64 MMHG | HEART RATE: 73 BPM | RESPIRATION RATE: 18 BRPM | TEMPERATURE: 98.2 F | SYSTOLIC BLOOD PRESSURE: 131 MMHG | OXYGEN SATURATION: 96 %

## 2018-04-04 DIAGNOSIS — R09.81 NASAL CONGESTION: Primary | ICD-10-CM

## 2018-04-04 DIAGNOSIS — J30.1 SEASONAL ALLERGIC RHINITIS DUE TO POLLEN: ICD-10-CM

## 2018-04-04 RX ORDER — AZELASTINE 1 MG/ML
1 SPRAY, METERED NASAL 2 TIMES DAILY
Qty: 1 BOTTLE | Refills: 0 | Status: SHIPPED | OUTPATIENT
Start: 2018-04-04 | End: 2019-03-04

## 2018-04-04 RX ORDER — PREDNISONE 5 MG/1
TABLET ORAL
Qty: 21 TAB | Refills: 0 | Status: SHIPPED | OUTPATIENT
Start: 2018-04-04 | End: 2019-03-04

## 2018-04-04 RX ORDER — CETIRIZINE HCL 10 MG
10 TABLET ORAL DAILY
Qty: 30 TAB | Refills: 0 | Status: SHIPPED | OUTPATIENT
Start: 2018-04-04 | End: 2019-03-04

## 2018-04-04 RX ORDER — BENZONATATE 200 MG/1
200 CAPSULE ORAL
Qty: 21 CAP | Refills: 0 | Status: SHIPPED | OUTPATIENT
Start: 2018-04-04 | End: 2018-04-11

## 2018-04-04 NOTE — PATIENT INSTRUCTIONS
Saline Nasal Washes: Care Instructions  Your Care Instructions  Saline nasal washes help keep the nasal passages open by washing out thick or dried mucus. This simple remedy can help relieve symptoms of allergies, sinusitis, and colds. It also can make the nose feel more comfortable by keeping the mucous membranes moist. You may notice a little burning sensation in your nose the first few times you use the solution, but this usually gets better in a few days. Follow-up care is a key part of your treatment and safety. Be sure to make and go to all appointments, and call your doctor if you are having problems. It's also a good idea to know your test results and keep a list of the medicines you take. How can you care for yourself at home? · You can buy premixed saline solution in a squeeze bottle or other sinus rinse products at a drugstore. Read and follow the instructions on the label. · You also can make your own saline solution by adding 1 teaspoon of salt and 1 teaspoon of baking soda to 2 cups of distilled water. · If you use a homemade solution, pour a small amount into a clean bowl. Using a rubber bulb syringe, squeeze the syringe and place the tip in the salt water. Pull a small amount of the salt water into the syringe by relaxing your hand. · Sit down with your head tilted slightly back. Do not lie down. Put the tip of the bulb syringe or the squeeze bottle a little way into one of your nostrils. Gently drip or squirt a few drops into the nostril. Repeat with the other nostril. Some sneezing and gagging are normal at first.  · Gently blow your nose. · Wipe the syringe or bottle tip clean after each use. · Repeat this 2 or 3 times a day. · Use nasal washes gently if you have nosebleeds often. When should you call for help? Watch closely for changes in your health, and be sure to contact your doctor if:  ? · You often get nosebleeds. ? · You have problems doing the nasal washes.    Where can you learn more? Go to http://salvatore-thu.info/. Enter [id number] in the search box to learn more about \"Saline Nasal Washes: Care Instructions. \"  Current as of: May 12, 2017  Content Version: 11.4  © 0306-7962 Healthwise, [employer]. Care instructions adapted under license by [employer] (which disclaims liability or warranty for this information). If you have questions about a medical condition or this instruction, always ask your healthcare professional. [address] any warranty or liability for your use of this information.

## 2018-04-04 NOTE — MR AVS SNAPSHOT
[address] [place] [address] 
[phone] Patient: Sundar Bowers MRN: [id number] LRJ:[birth year] Visit Information Date & Time Provider Department Dept. Phone Encounter #  
 4/4/2018 10:30 AM [facility] [phone] [id number] Follow-up Instructions Return if symptoms worsen or fail to improve, for Follow up with PCP. Upcoming Health Maintenance Date Due  
 FOOT EXAM Q1 10/30/1940 MICROALBUMIN Q1 10/30/1940 EYE EXAM RETINAL OR DILATED Q1 10/30/1940 DTaP/Tdap/Td series (1 - Tdap) 10/30/1951 ZOSTER VACCINE AGE 60> 8/30/1990 GLAUCOMA SCREENING Q2Y 10/30/1995 Pneumococcal 65+ Low/Medium Risk (1 of 2 - PCV13) 10/30/1995 HEMOGLOBIN A1C Q6M 2/23/2018 MEDICARE YEARLY EXAM 3/20/2018 LIPID PANEL Q1 8/20/2018 Allergies as of 4/4/2018  Review Complete On: 4/4/2018 By: Maikol Mtz RN Severity Noted Reaction Type Reactions Iodine  04/18/2013    Other (comments) \"it gives me a headache\" Current Immunizations  Reviewed on 9/11/2017 Name Date Influenza Vaccine 8/21/2017 Not reviewed this visit You Were Diagnosed With   
  
 Codes Comments Nasal congestion    -  Primary ICD-10-CM: R09.81 ICD-9-CM: 478.19 Seasonal allergic rhinitis due to pollen     ICD-10-CM: J30.1 ICD-9-CM: 477.0 Vitals BP Pulse Temp Resp Height(growth percentile) Weight(growth percentile) 131/64 73 98.2 °F (36.8 °C) 18 5' 6\" (1.676 m) 253 lb (114.8 kg) SpO2 BMI Smoking Status 96% 40.84 kg/m2 Never Smoker BMI and BSA Data Body Mass Index Body Surface Area  
 40.84 kg/m 2 2.31 m 2 Preferred Pharmacy Pharmacy Name Phone [address] [address] [address] [phone] Your Updated Medication List  
  
   
This list is accurate as of 4/4/18 11:23 AM.  Always use your most recent med list. amLODIPine 10 mg tablet Commonly known as:  Unknown Carney Take 10 mg by mouth daily. aspirin delayed-release 81 mg tablet Take 1 Tab by mouth daily. azelastine 137 mcg (0.1 %) nasal spray Commonly known as:  ASTELIN  
1 Spray by Both Nostrils route two (2) times a day. Use in each nostril as directed  
  
 benzonatate 200 mg capsule Commonly known as:  TESSALON Take 1 Cap by mouth three (3) times daily as needed for Cough for up to 7 days. betamethasone dipropionate 0.05 % topical cream  
Commonly known as:  Eliseo Dopp Apply  to affected area two (2) times a day. bicalutamide 50 mg tablet Commonly known as:  CASODEX Take 50 mg by mouth daily. cetirizine 10 mg tablet Commonly known as:  ZyrTEC Take 1 Tab by mouth daily. clopidogrel 75 mg Tab Commonly known as:  PLAVIX Take 1 Tab by mouth daily. Indications: Thrombosis Prevention after PCI  
  
 COLACE 100 mg capsule Generic drug:  docusate sodium Take 100 mg by mouth nightly as needed for Constipation. FLONASE 50 mcg/actuation nasal spray Generic drug:  fluticasone 2 Sprays by Both Nostrils route nightly. LEG CRAMP TREATMENT PO Take 1 Cap by mouth nightly. Filiberto's leg cramps  
  
 lisinopril 40 mg tablet Commonly known as:  Cheshire McCoy Take 40 mg by mouth daily. loratadine 10 mg tablet Commonly known as:  [place] Take 10 mg by mouth nightly as needed for Allergies. methocarbamol 750 mg tablet Commonly known as:  ROBAXIN Take 750 mg by mouth nightly. metoprolol tartrate 25 mg tablet Commonly known as:  LOPRESSOR Take 0.5 Tabs by mouth daily. pravastatin 20 mg tablet Commonly known as:  PRAVACHOL Take 20 mg by mouth nightly. predniSONE 5 mg dose pack Commonly known as:  STERAPRED See administration instruction per 5mg dose pack VITAMIN C 1,000 mg tablet Generic drug:  ascorbic acid (vitamin C) Take 1,000 mg by mouth daily. Prescriptions Sent to Pharmacy Refills  
 benzonatate (TESSALON) 200 mg capsule 0 Sig: Take 1 Cap by mouth three (3) times daily as needed for Cough for up to 7 days. Class: Normal  
 [facility] [address] [phone] Route: Oral  
 cetirizine (ZYRTEC) 10 mg tablet 0 Sig: Take 1 Tab by mouth daily. Class: Normal  
 [facility]: [phone] Route: Oral  
 predniSONE (STERAPRED) 5 mg dose pack 0 Sig: See administration instruction per 5mg dose pack Class: Normal  
 Pharmacy: [address] [facility] Ph #: [phone]  
 azelastine (ASTELIN) 137 mcg (0.1 %) nasal spray 0 [place] by Both Nostrils route two (2) times a day. Use in each nostril as directed Class: Normal  
 Pharmacy:  [facility] Ph #: [phone] Route: Both Nostrils Follow-up Instructions Return if symptoms worsen or fail to improve, for Follow up with PCP. Patient Instructions Saline Nasal Washes: Care Instructions Your Care Instructions Saline nasal washes help keep the nasal passages open by washing out thick or dried mucus. This simple remedy can help relieve symptoms of allergies, sinusitis, and colds. It also can make the nose feel more comfortable by keeping the mucous membranes moist. You may notice a little burning sensation in your nose the first few times you use the solution, but this usually gets better in a few days. Follow-up care is a key part of your treatment and safety. Be sure to make and go to all appointments, and call your doctor if you are having problems. It's also a good idea to know your test results and keep a list of the medicines you take. How can you care for yourself at home? · You can buy premixed saline solution in a squeeze bottle or other sinus rinse products at a drugstore. Read and follow the instructions on the label. · You also can make your own saline solution by adding 1 teaspoon of salt and 1 teaspoon of baking soda to 2 cups of distilled water. · If you use a homemade solution, pour a small amount into a clean bowl. Using a rubber bulb syringe, squeeze the syringe and place the tip in the salt water. Pull a small amount of the salt water into the syringe by relaxing your hand. · Sit down with your head tilted slightly back. Do not lie down. Put the tip of the bulb syringe or the squeeze bottle a little way into one of your nostrils. Gently drip or squirt a few drops into the nostril. Repeat with the other nostril. Some sneezing and gagging are normal at first. 
· Gently blow your nose. · Wipe the syringe or bottle tip clean after each use. · Repeat this 2 or 3 times a day. · Use nasal washes gently if you have nosebleeds often. When should you call for help? Watch closely for changes in your health, and be sure to contact your doctor if: 
? · You often get nosebleeds. ? · You have problems doing the nasal washes. Where can you learn more? Go to http://salvatore-thu.info/. Enter [phone] in the search box to learn more about \"Saline Nasal Washes: Care Instructions. \" Current as of: May 12, 2017 Content Version: 11.4 © 1621-4057 [employer]. Care instructions adapted under license by [employer] (which disclaims liability or warranty for this information). If you have questions about a medical condition or this instruction, always ask your healthcare professional. [place] [address] any warranty or liability for your use of this information. Introducing [facility]!    
 [facility] introduces [employer] patient portal. Now you can access parts of your medical record, email your doctor's office, and request medication refills online. 1. In your internet browser, go to [email or web address] 2. Click on the First Time User? Click Here link in the Sign In box. You will see the New Member Sign Up page. 3. Enter your Mobivity Access Code exactly as it appears below. You will not need to use this code after youve completed the sign-up process. If you do not sign up before the expiration date, you must request a new code. · Mobivity Access Code: [email or web address] Expires: 5/28/2018  2:03 PM 
 
4. Enter the last four digits of your Social Security Number (xxxx) and Date of Birth (mm/dd/yyyy) as indicated and click Submit. You will be taken to the next sign-up page. 5. Create a Mobivity ID. This will be your Mobivity login ID and cannot be changed, so think of one that is secure and easy to remember. 6. Create a Mobivity password. You can change your password at any time. 7. Enter your Password Reset Question and Answer. This can be used at a later time if you forget your password. 8. Enter your e-mail address. You will receive e-mail notification when new information is available in [address] 9. Click Sign Up. You can now view and download portions of your medical record. 10. Click the Download Summary menu link to download a portable copy of your medical information. If you have questions, please visit the Frequently Asked Questions section of the Mobivity website. Remember, Mobivity is NOT to be used for urgent needs. For medical emergencies, dial 911. Now available from your iPhone and Android! Please provide this summary of care documentation to your next provider. Your primary care clinician is listed as [address]. If you have any questions after today's visit, please call [phone].

## 2018-04-04 NOTE — PROGRESS NOTES
Patient is a 80 y.o. male presenting with sinus pain. The history is provided by the patient. Sinus Pain   This is a new problem. The current episode started more than 2 days ago. The problem occurs daily. The problem has not changed since onset. Pertinent negatives include no headaches and no shortness of breath. Associated symptoms comments: Nasal congestion/ sneezing . The symptoms are aggravated by sneezing and coughing. Nothing relieves the symptoms. He has tried nothing for the symptoms. Past Medical History:   Diagnosis Date    CAD (coronary artery disease) 08/21/2017    PCI and stent    Cancer ([facility]     Prostate CA    Diabetes ([facility]     Hypertension     Myocardial infarct ([facility] 2000    Sleep apnea     wears C-PAP    Sleep disorder     Sleep Apnea C-PAP use at home         Past Surgical History:   Procedure Laterality Date    CARDIAC SURG PROCEDURE UNLIST  2000    CABG    HX COLECTOMY  2012    HX CORONARY STENT PLACEMENT      HX PROSTATECTOMY           Family History   Problem Relation Age of Onset    Heart Disease Sister         Social History     Social History    Marital status: SINGLE     Spouse name: N/A    Number of children: N/A    Years of education: N/A     Occupational History    Not on file. Social History Main Topics    Smoking status: Never Smoker    Smokeless tobacco: Never Used    Alcohol use No    Drug use: Yes    Sexual activity: Not on file     Other Topics Concern    Not on file     Social History Narrative                ALLERGIES: Iodine    Review of Systems   HENT: Positive for congestion, postnasal drip, rhinorrhea, sinus pain and sneezing. Respiratory: Negative for shortness of breath. Neurological: Negative for headaches. All other systems reviewed and are negative.       Vitals:    04/04/18 1052   BP: 131/64   Pulse: 73   Resp: 18   Temp: 98.2 °F (36.8 °C)   SpO2: 96%   Weight: 253 lb (114.8 kg)   Height: 5' 6\" (1.676 m)       Physical Exam Constitutional: No distress. HENT:   Right Ear: Tympanic membrane and ear canal normal.   Left Ear: Tympanic membrane and ear canal normal.   Nose: Mucosal edema and rhinorrhea present. Mouth/Throat: No oropharyngeal exudate, posterior oropharyngeal edema or posterior oropharyngeal erythema. Eyes: Conjunctivae are normal. Right eye exhibits no discharge. Left eye exhibits no discharge. Neck: Neck supple. Pulmonary/Chest: Effort normal and breath sounds normal. No respiratory distress. He has no wheezes. He has no rales. Lymphadenopathy:     He has no cervical adenopathy. Skin: No rash noted. Nursing note and vitals reviewed. MDM    Procedures        ICD-10-CM ICD-9-CM    1. Nasal congestion R09.81 478.19    2. Seasonal allergic rhinitis due to pollen J30.1 477.0      Medications Ordered Today   Medications    benzonatate (TESSALON) 200 mg capsule     Sig: Take 1 Cap by mouth three (3) times daily as needed for Cough for up to 7 days. Dispense:  21 Cap     Refill:  0    cetirizine (ZYRTEC) 10 mg tablet     Sig: Take 1 Tab by mouth daily. Dispense:  30 Tab     Refill:  0    predniSONE (STERAPRED) 5 mg dose pack     Sig: See administration instruction per 5mg dose pack     Dispense:  21 Tab     Refill:  0    azelastine (ASTELIN) 137 mcg (0.1 %) nasal spray     [place] by Both Nostrils route two (2) times a day. Use in each nostril as directed     Dispense:  1 Bottle     Refill:  0     No results found for any visits on 18. The patients condition was discussed with the patient and they understand. The patient is to follow up with primary care doctor. If signs and symptoms become worse the pt is to go to the ER. The patient is to take medications as prescribed.

## 2018-06-18 NOTE — CARDIO/PULMONARY
Joe Miles  Completed phase II cardiac rehab and attended 36 sessions from 9/11/17 - 3/27/18. Joe Miles is interested in maintaining optimal health and will work with Dr. Massimo Jeff MD.  Joe Miles has improved his endurance and stamina through regular exercise, lost 20 lbs and 0.75 inches from his waist. Blood pressure is 135/74 and is WNL. He has also improved his nutrition, [employer] and depression scores and these were reviewed with patient.        Joe Miles plans to continue exercising with the [employer] after 50's program.    Ailyn Ta, RN  6/18/2018

## 2019-03-04 ENCOUNTER — OFFICE VISIT (OUTPATIENT)
Dept: URGENT CARE | Age: 84
End: 2019-03-04

## 2019-03-04 VITALS
OXYGEN SATURATION: 92 % | WEIGHT: 265 LBS | SYSTOLIC BLOOD PRESSURE: 140 MMHG | RESPIRATION RATE: 18 BRPM | DIASTOLIC BLOOD PRESSURE: 65 MMHG | TEMPERATURE: 97.8 F | HEIGHT: 66 IN | HEART RATE: 78 BPM | BODY MASS INDEX: 42.59 KG/M2

## 2019-03-04 DIAGNOSIS — J30.9 ALLERGIC RHINITIS, UNSPECIFIED SEASONALITY, UNSPECIFIED TRIGGER: Primary | ICD-10-CM

## 2019-03-04 DIAGNOSIS — H04.203 WATERY EYES: ICD-10-CM

## 2019-03-04 RX ORDER — TRIAMCINOLONE ACETONIDE 55 UG/1
2 SPRAY, METERED NASAL DAILY
Qty: 1 BOTTLE | Refills: 0 | Status: SHIPPED | OUTPATIENT
Start: 2019-03-04 | End: 2019-05-24

## 2019-03-04 RX ORDER — LEVOCETIRIZINE DIHYDROCHLORIDE 5 MG/1
5 TABLET, FILM COATED ORAL DAILY
Qty: 30 TAB | Refills: 0 | Status: SHIPPED | OUTPATIENT
Start: 2019-03-04 | End: 2019-05-24

## 2019-03-04 NOTE — PATIENT INSTRUCTIONS
Follow up with your Primary Care Doctor if not improving over next 7-10 days. Managing Your Allergies: Care Instructions  Your Care Instructions    Managing your allergies is an important part of staying healthy. Your doctor will help you find out what may be causing the allergies. Common causes of allergy symptoms are house dust and dust mites, animal dander, mold, and pollen. As soon as you know what triggers your symptoms, try to reduce your exposure to your triggers. This can help prevent allergy symptoms, asthma, and other health problems. Ask your doctor about allergy medicine or immunotherapy. These treatments may help reduce or prevent allergy symptoms. Follow-up care is a key part of your treatment and safety. Be sure to make and go to all appointments, and call your doctor if you are having problems. It's also a good idea to know your test results and keep a list of the medicines you take. How can you care for yourself at home? · If you think that dust or dust mites are causing your allergies:  ? Wash sheets, pillowcases, and other bedding every week in hot water. ? Use airtight, dust-proof covers for pillows, duvets, and mattresses. Avoid plastic covers, because they tend to tear quickly and do not \"breathe. \" Wash according to the instructions. ? Remove extra blankets and pillows that you don't need. ? Use blankets that are machine-washable. ? Don't use home humidifiers. They can help mites live longer. · Use air-conditioning. Change or clean all filters every month. Keep windows closed. Use high-efficiency air filters. Don't use window or attic fans, which draw dust into the air. · If you're allergic to pet dander, keep pets outside or, at the very least, out of your bedroom. Old carpet and cloth-covered furniture can hold a lot of animal dander. You may need to replace them. · Look for signs of cockroaches. Use cockroach baits to get rid of them. Then clean your home well.   · If you're allergic to mold, don't keep indoor plants, because molds can grow in soil. Get rid of furniture, rugs, and drapes that smell musty. Check for mold in the bathroom. · If you're allergic to pollen, stay inside when pollen counts are high. · Don't smoke or let anyone else smoke in your house. Don't use fireplaces or wood-burning stoves. Avoid paint fumes, perfumes, and other strong odors. When should you call for help? Give an epinephrine shot if:    · You think you are having a severe allergic reaction.    After giving an epinephrine shot call 911, even if you feel better.   Call 911 if:    · You have symptoms of a severe allergic reaction. These may include:  ? Sudden raised, red areas (hives) all over your body. ? Swelling of the throat, mouth, lips, or tongue. ? Trouble breathing. ? Passing out (losing consciousness). Or you may feel very lightheaded or suddenly feel weak, confused, or restless.     · You have been given an epinephrine shot, even if you feel better.    Call your doctor now or seek immediate medical care if:    · You have symptoms of an allergic reaction, such as:  ? A rash or hives (raised, red areas on the skin). ? Itching. ? Swelling. ? Belly pain, nausea, or vomiting.    Watch closely for changes in your health, and be sure to contact your doctor if:    · Your allergies get worse.     · You need help controlling your allergies.     · You have questions about allergy testing.     · You do not get better as expected. Where can you learn more? Go to http://salvatore-thu.info/. Enter L249 in the search box to learn more about \"Managing Your Allergies: Care Instructions. \"  Current as of: June 27, 2018  Content Version: 11.9  © 9066-4686 [employer]. Care instructions adapted under license by [employer] (which disclaims liability or warranty for this information).  If you have questions about a medical condition or this instruction, always ask your healthcare professional. [place] [address] any warranty or liability for your use of this information.

## 2019-03-04 NOTE — PROGRESS NOTES
Here for nasal congestion, itchy nose and watery eyes for 1 month  Deneis fever, chills, headaches, weakness or body aches  No significant cough  Has not tried any particular therapies or medications for this  Has normal energy otherwise and no other concerns at this time. Past Medical History:   Diagnosis Date    CAD (coronary artery disease) 08/21/2017    PCI and stent    [facility] ([facility] [address]     Prostate CA    [facility]     Hypertension     Myocardial infarct ([facility] 2000    Sleep apnea     wears C-PAP    Sleep disorder     Sleep Apnea C-PAP use at home         Past Surgical History:   Procedure Laterality Date    CARDIAC SURG PROCEDURE UNLIST  2000    CABG    HX COLECTOMY  2012    HX CORONARY STENT PLACEMENT      HX PROSTATECTOMY           Family History   Problem Relation Age of Onset    Heart Disease Sister         Social History     Socioeconomic History    Marital status: SINGLE     Spouse name: Not on file    Number of children: Not on file    Years of education: Not on file    Highest education level: Not on file   Social Needs    Financial resource strain: Not on file    Food insecurity - worry: Not on file    Food insecurity - inability: Not on file   [employer] needs - medical: Not on file   [employer] needs - non-medical: Not on file   Occupational History    Not on file   Tobacco Use    Smoking status: Never Smoker    Smokeless tobacco: Never Used   Substance and Sexual Activity    Alcohol use: No    Drug use: Yes    Sexual activity: Not on file   Other Topics Concern    Not on file   Social History Narrative    Not on file                ALLERGIES: Iodine    Review of Systems   Neurological: Negative for dizziness and weakness. All other systems reviewed and are negative.       Vitals:    03/04/19 1342   BP: 140/65   Pulse: 78   Resp: 18   Temp: 97.8 °F (36.6 °C)   SpO2: 92%   Weight: 265 lb (120.2 kg)   Height: 5' 6\" (1.676 m)       Physical Exam Constitutional: He is oriented to person, place, and time. No distress. Very pleasant mood. HENT:   Mouth/Throat: No oropharyngeal exudate. Pale swollen boggy nasal turbinates bilat. No purulent discharge in passages. OP clear and moist. TMs normal.   Eyes: EOM are normal. Pupils are equal, round, and reactive to light. Watering bilat. No purulent discharge. Sclera non injected. Neck: Normal range of motion. Neck supple. Cardiovascular: Normal rate, regular rhythm and normal heart sounds. Pulmonary/Chest: Effort normal and breath sounds normal. No respiratory distress. He has no wheezes. He has no rales. Lymphadenopathy:     He has no cervical adenopathy. Neurological: He is alert and oriented to person, place, and time. Psychiatric: He has a normal mood and affect. His behavior is normal. Thought content normal.       MDM     Differential Diagnosis; Clinical Impression; Plan:       CLINICAL IMPRESSION:  (J30.9) Allergic rhinitis, unspecified seasonality, unspecified trigger  (primary encounter diagnosis)  (H04.203) Watery eyes    Orders Placed This Encounter      levocetirizine (XYZAL) 5 mg tablet          Sig: Take 1 Tab by mouth daily. Dispense:  30 Tab          Refill:  0      triamcinolone (NASACORT AQ) 55 mcg nasal inhaler          Si Sprays by Both Nostrils route daily. Dispense:  1 Bottle          Refill:  0      Plan:  Allergies   See above orders  Review handouts  Advised PCP re eval in 7-10 days without relief. We have reviewed concerning signs/symptoms, normal vs abnormal progression of medical condition and when to seek immediate medical attention. Schedule with PCP or Urgent Care immediately for worsening or new symptoms.             Procedures

## 2019-05-24 ENCOUNTER — HOSPITAL ENCOUNTER (INPATIENT)
Age: 84
LOS: 1 days | Discharge: HOME | DRG: 439 | End: 2019-05-25
Attending: EMERGENCY MEDICINE | Admitting: INTERNAL MEDICINE
Payer: MEDICARE

## 2019-05-24 ENCOUNTER — APPOINTMENT (OUTPATIENT)
Dept: ULTRASOUND IMAGING | Age: 84
DRG: 439 | End: 2019-05-24
Attending: NURSE PRACTITIONER
Payer: MEDICARE

## 2019-05-24 ENCOUNTER — APPOINTMENT (OUTPATIENT)
Dept: CT IMAGING | Age: 84
DRG: 439 | End: 2019-05-24
Attending: EMERGENCY MEDICINE
Payer: MEDICARE

## 2019-05-24 ENCOUNTER — APPOINTMENT (OUTPATIENT)
Dept: GENERAL RADIOLOGY | Age: 84
DRG: 439 | End: 2019-05-24
Attending: EMERGENCY MEDICINE
Payer: MEDICARE

## 2019-05-24 ENCOUNTER — APPOINTMENT (OUTPATIENT)
Dept: NON INVASIVE DIAGNOSTICS | Age: 84
DRG: 439 | End: 2019-05-24
Attending: INTERNAL MEDICINE
Payer: MEDICARE

## 2019-05-24 DIAGNOSIS — K85.90 ACUTE PANCREATITIS, UNSPECIFIED COMPLICATION STATUS, UNSPECIFIED PANCREATITIS TYPE: Primary | ICD-10-CM

## 2019-05-24 PROBLEM — E66.01 MORBID OBESITY (HCC): Status: ACTIVE | Noted: 2019-05-24

## 2019-05-24 PROBLEM — I10 UNCONTROLLED HYPERTENSION: Status: ACTIVE | Noted: 2019-05-24

## 2019-05-24 PROBLEM — R07.9 CHEST PAIN: Status: ACTIVE | Noted: 2019-05-24

## 2019-05-24 LAB
ALBUMIN SERPL-MCNC: 3.6 G/DL (ref 3.5–5)
ALBUMIN/GLOB SERPL: 0.9 {RATIO} (ref 1.1–2.2)
ALP SERPL-CCNC: 71 U/L (ref 45–117)
ALT SERPL-CCNC: 15 U/L (ref 12–78)
ANION GAP SERPL CALC-SCNC: 8 MMOL/L (ref 5–15)
AST SERPL-CCNC: 13 U/L (ref 15–37)
ATRIAL RATE: 72 BPM
BASOPHILS # BLD: 0 K/UL (ref 0–0.1)
BASOPHILS NFR BLD: 0 % (ref 0–1)
BILIRUB SERPL-MCNC: 0.5 MG/DL (ref 0.2–1)
BUN SERPL-MCNC: 17 MG/DL (ref 6–20)
BUN/CREAT SERPL: 11 (ref 12–20)
CALCIUM SERPL-MCNC: 9.3 MG/DL (ref 8.5–10.1)
CALCULATED P AXIS, ECG09: 67 DEGREES
CALCULATED R AXIS, ECG10: 55 DEGREES
CALCULATED T AXIS, ECG11: 122 DEGREES
CHLORIDE SERPL-SCNC: 111 MMOL/L (ref 97–108)
CO2 SERPL-SCNC: 25 MMOL/L (ref 21–32)
COMMENT, HOLDF: NORMAL
CREAT SERPL-MCNC: 1.52 MG/DL (ref 0.7–1.3)
DIAGNOSIS, 93000: NORMAL
DIFFERENTIAL METHOD BLD: ABNORMAL
ECHO AV AREA PEAK VELOCITY: 2.9 CM2
ECHO AV PEAK GRADIENT: 8.6 MMHG
ECHO AV PEAK VELOCITY: 146.39 CM/S
ECHO EST RA PRESSURE: 5 MMHG
ECHO LA AREA 4C: 25.7 CM2
ECHO LA VOL 2C: 85.94 ML (ref 18–58)
ECHO LA VOL 4C: 80.38 ML (ref 18–58)
ECHO LA VOL BP: 90.48 ML (ref 18–58)
ECHO LA VOL/BSA BIPLANE: 40.8 ML/M2 (ref 16–28)
ECHO LA VOLUME INDEX A2C: 38.75 ML/M2 (ref 16–28)
ECHO LA VOLUME INDEX A4C: 36.24 ML/M2 (ref 16–28)
ECHO LV E' LATERAL VELOCITY: 6.79 CM/S
ECHO LV E' SEPTAL VELOCITY: 10.01 CM/S
ECHO LV INTERNAL DIMENSION DIASTOLIC: 5.25 CM (ref 4.2–5.9)
ECHO LV INTERNAL DIMENSION SYSTOLIC: 3.29 CM
ECHO LV IVSD: 0.69 CM (ref 0.6–1)
ECHO LV MASS 2D: 130.6 G (ref 88–224)
ECHO LV MASS INDEX 2D: 58.9 G/M2 (ref 49–115)
ECHO LV POSTERIOR WALL DIASTOLIC: 0.63 CM (ref 0.6–1)
ECHO LVOT DIAM: 2.23 CM
ECHO LVOT PEAK GRADIENT: 4.8 MMHG
ECHO LVOT PEAK VELOCITY: 109.8 CM/S
ECHO MV A VELOCITY: 94.07 CM/S
ECHO MV AREA PHT: 2.9 CM2
ECHO MV E DECELERATION TIME (DT): 262.5 MS
ECHO MV E VELOCITY: 111.92 CM/S
ECHO MV E/A RATIO: 1.19
ECHO MV E/E' LATERAL: 16.48
ECHO MV E/E' RATIO (AVERAGED): 13.83
ECHO MV E/E' SEPTAL: 11.18
ECHO MV PRESSURE HALF TIME (PHT): 76.1 MS
ECHO PULMONARY ARTERY SYSTOLIC PRESSURE (PASP): 49 MMHG
ECHO PV MAX VELOCITY: 85.6 CM/S
ECHO PV PEAK GRADIENT: 2.9 MMHG
ECHO RIGHT VENTRICULAR SYSTOLIC PRESSURE (RVSP): 49 MMHG
ECHO TV REGURGITANT MAX VELOCITY: 331.77 CM/S
ECHO TV REGURGITANT PEAK GRADIENT: 44 MMHG
EOSINOPHIL # BLD: 0.3 K/UL (ref 0–0.4)
EOSINOPHIL NFR BLD: 5 % (ref 0–7)
ERYTHROCYTE [DISTWIDTH] IN BLOOD BY AUTOMATED COUNT: 15.8 % (ref 11.5–14.5)
GLOBULIN SER CALC-MCNC: 3.8 G/DL (ref 2–4)
GLUCOSE BLD STRIP.AUTO-MCNC: 109 MG/DL (ref 65–100)
GLUCOSE BLD STRIP.AUTO-MCNC: 121 MG/DL (ref 65–100)
GLUCOSE SERPL-MCNC: 103 MG/DL (ref 65–100)
HCT VFR BLD AUTO: 40.4 % (ref 36.6–50.3)
HGB BLD-MCNC: 12.7 G/DL (ref 12.1–17)
IMM GRANULOCYTES # BLD AUTO: 0 K/UL (ref 0–0.04)
IMM GRANULOCYTES NFR BLD AUTO: 0 % (ref 0–0.5)
LIPASE SERPL-CCNC: 704 U/L (ref 73–393)
LYMPHOCYTES # BLD: 2 K/UL (ref 0.8–3.5)
LYMPHOCYTES NFR BLD: 30 % (ref 12–49)
MCH RBC QN AUTO: 27.3 PG (ref 26–34)
MCHC RBC AUTO-ENTMCNC: 31.4 G/DL (ref 30–36.5)
MCV RBC AUTO: 86.9 FL (ref 80–99)
MONOCYTES # BLD: 0.6 K/UL (ref 0–1)
MONOCYTES NFR BLD: 10 % (ref 5–13)
NEUTS SEG # BLD: 3.6 K/UL (ref 1.8–8)
NEUTS SEG NFR BLD: 55 % (ref 32–75)
NRBC # BLD: 0 K/UL (ref 0–0.01)
NRBC BLD-RTO: 0 PER 100 WBC
P-R INTERVAL, ECG05: 152 MS
PLATELET # BLD AUTO: 177 K/UL (ref 150–400)
PMV BLD AUTO: 11.3 FL (ref 8.9–12.9)
POTASSIUM SERPL-SCNC: 3.7 MMOL/L (ref 3.5–5.1)
PROT SERPL-MCNC: 7.4 G/DL (ref 6.4–8.2)
Q-T INTERVAL, ECG07: 400 MS
QRS DURATION, ECG06: 82 MS
QTC CALCULATION (BEZET), ECG08: 438 MS
RBC # BLD AUTO: 4.65 M/UL (ref 4.1–5.7)
SAMPLES BEING HELD,HOLD: NORMAL
SERVICE CMNT-IMP: ABNORMAL
SERVICE CMNT-IMP: ABNORMAL
SODIUM SERPL-SCNC: 144 MMOL/L (ref 136–145)
TROPONIN I SERPL-MCNC: <0.05 NG/ML
TROPONIN I SERPL-MCNC: <0.05 NG/ML
VENTRICULAR RATE, ECG03: 72 BPM
WBC # BLD AUTO: 6.6 K/UL (ref 4.1–11.1)

## 2019-05-24 PROCEDURE — 71046 X-RAY EXAM CHEST 2 VIEWS: CPT

## 2019-05-24 PROCEDURE — 74011250637 HC RX REV CODE- 250/637: Performed by: EMERGENCY MEDICINE

## 2019-05-24 PROCEDURE — 36415 COLL VENOUS BLD VENIPUNCTURE: CPT

## 2019-05-24 PROCEDURE — 80053 COMPREHEN METABOLIC PANEL: CPT

## 2019-05-24 PROCEDURE — 74011250636 HC RX REV CODE- 250/636: Performed by: EMERGENCY MEDICINE

## 2019-05-24 PROCEDURE — 84484 ASSAY OF TROPONIN QUANT: CPT

## 2019-05-24 PROCEDURE — 65660000000 HC RM CCU STEPDOWN

## 2019-05-24 PROCEDURE — 74011250636 HC RX REV CODE- 250/636: Performed by: INTERNAL MEDICINE

## 2019-05-24 PROCEDURE — 74011250637 HC RX REV CODE- 250/637: Performed by: INTERNAL MEDICINE

## 2019-05-24 PROCEDURE — 74176 CT ABD & PELVIS W/O CONTRAST: CPT

## 2019-05-24 PROCEDURE — 76705 ECHO EXAM OF ABDOMEN: CPT

## 2019-05-24 PROCEDURE — 93306 TTE W/DOPPLER COMPLETE: CPT

## 2019-05-24 PROCEDURE — 85025 COMPLETE CBC W/AUTO DIFF WBC: CPT

## 2019-05-24 PROCEDURE — 83690 ASSAY OF LIPASE: CPT

## 2019-05-24 PROCEDURE — 96375 TX/PRO/DX INJ NEW DRUG ADDON: CPT

## 2019-05-24 PROCEDURE — 99285 EMERGENCY DEPT VISIT HI MDM: CPT

## 2019-05-24 PROCEDURE — 93005 ELECTROCARDIOGRAM TRACING: CPT

## 2019-05-24 PROCEDURE — 96374 THER/PROPH/DIAG INJ IV PUSH: CPT

## 2019-05-24 PROCEDURE — 82962 GLUCOSE BLOOD TEST: CPT

## 2019-05-24 RX ORDER — HYDROMORPHONE HYDROCHLORIDE 1 MG/ML
1 INJECTION, SOLUTION INTRAMUSCULAR; INTRAVENOUS; SUBCUTANEOUS
Status: DISCONTINUED | OUTPATIENT
Start: 2019-05-24 | End: 2019-05-25 | Stop reason: HOSPADM

## 2019-05-24 RX ORDER — SODIUM CHLORIDE 0.9 % (FLUSH) 0.9 %
5-40 SYRINGE (ML) INJECTION EVERY 8 HOURS
Status: DISCONTINUED | OUTPATIENT
Start: 2019-05-24 | End: 2019-05-25 | Stop reason: HOSPADM

## 2019-05-24 RX ORDER — CLOPIDOGREL BISULFATE 75 MG/1
75 TABLET ORAL DAILY
Status: DISCONTINUED | OUTPATIENT
Start: 2019-05-24 | End: 2019-05-25 | Stop reason: HOSPADM

## 2019-05-24 RX ORDER — LISINOPRIL 20 MG/1
40 TABLET ORAL DAILY
Status: DISCONTINUED | OUTPATIENT
Start: 2019-05-24 | End: 2019-05-25 | Stop reason: HOSPADM

## 2019-05-24 RX ORDER — MORPHINE SULFATE 2 MG/ML
4 INJECTION, SOLUTION INTRAMUSCULAR; INTRAVENOUS ONCE
Status: COMPLETED | OUTPATIENT
Start: 2019-05-24 | End: 2019-05-24

## 2019-05-24 RX ORDER — METOPROLOL TARTRATE 25 MG/1
12.5 TABLET, FILM COATED ORAL DAILY
Status: DISCONTINUED | OUTPATIENT
Start: 2019-05-24 | End: 2019-05-25 | Stop reason: HOSPADM

## 2019-05-24 RX ORDER — SODIUM CHLORIDE 0.9 % (FLUSH) 0.9 %
5-40 SYRINGE (ML) INJECTION AS NEEDED
Status: DISCONTINUED | OUTPATIENT
Start: 2019-05-24 | End: 2019-05-25 | Stop reason: HOSPADM

## 2019-05-24 RX ORDER — PRAVASTATIN SODIUM 20 MG/1
20 TABLET ORAL
Status: DISCONTINUED | OUTPATIENT
Start: 2019-05-24 | End: 2019-05-25 | Stop reason: HOSPADM

## 2019-05-24 RX ORDER — MORPHINE SULFATE 2 MG/ML
2 INJECTION, SOLUTION INTRAMUSCULAR; INTRAVENOUS
Status: DISCONTINUED | OUTPATIENT
Start: 2019-05-24 | End: 2019-05-25 | Stop reason: HOSPADM

## 2019-05-24 RX ORDER — METHOCARBAMOL 750 MG/1
750 TABLET, FILM COATED ORAL
COMMUNITY
End: 2019-07-08

## 2019-05-24 RX ORDER — ONDANSETRON 2 MG/ML
4 INJECTION INTRAMUSCULAR; INTRAVENOUS
Status: DISCONTINUED | OUTPATIENT
Start: 2019-05-24 | End: 2019-05-25 | Stop reason: HOSPADM

## 2019-05-24 RX ORDER — ENOXAPARIN SODIUM 100 MG/ML
40 INJECTION SUBCUTANEOUS EVERY 24 HOURS
Status: DISCONTINUED | OUTPATIENT
Start: 2019-05-24 | End: 2019-05-25 | Stop reason: HOSPADM

## 2019-05-24 RX ORDER — ASPIRIN 81 MG/1
81 TABLET ORAL DAILY
Status: DISCONTINUED | OUTPATIENT
Start: 2019-05-24 | End: 2019-05-25 | Stop reason: HOSPADM

## 2019-05-24 RX ORDER — DOCUSATE SODIUM 100 MG/1
100 CAPSULE, LIQUID FILLED ORAL
Status: DISCONTINUED | OUTPATIENT
Start: 2019-05-24 | End: 2019-05-25 | Stop reason: HOSPADM

## 2019-05-24 RX ORDER — ACETAMINOPHEN 325 MG/1
650 TABLET ORAL
Status: DISCONTINUED | OUTPATIENT
Start: 2019-05-24 | End: 2019-05-25 | Stop reason: HOSPADM

## 2019-05-24 RX ORDER — ONDANSETRON 2 MG/ML
4 INJECTION INTRAMUSCULAR; INTRAVENOUS
Status: COMPLETED | OUTPATIENT
Start: 2019-05-24 | End: 2019-05-24

## 2019-05-24 RX ORDER — ASCORBIC ACID 500 MG
500 TABLET ORAL DAILY
COMMUNITY

## 2019-05-24 RX ORDER — AMLODIPINE BESYLATE 5 MG/1
10 TABLET ORAL DAILY
Status: DISCONTINUED | OUTPATIENT
Start: 2019-05-24 | End: 2019-05-25 | Stop reason: HOSPADM

## 2019-05-24 RX ORDER — SODIUM CHLORIDE, SODIUM LACTATE, POTASSIUM CHLORIDE, CALCIUM CHLORIDE 600; 310; 30; 20 MG/100ML; MG/100ML; MG/100ML; MG/100ML
200 INJECTION, SOLUTION INTRAVENOUS CONTINUOUS
Status: DISCONTINUED | OUTPATIENT
Start: 2019-05-24 | End: 2019-05-25 | Stop reason: HOSPADM

## 2019-05-24 RX ORDER — BICALUTAMIDE 50 MG/1
50 TABLET, FILM COATED ORAL DAILY
Status: DISCONTINUED | OUTPATIENT
Start: 2019-05-24 | End: 2019-05-25 | Stop reason: HOSPADM

## 2019-05-24 RX ORDER — LORATADINE 10 MG/1
10 TABLET ORAL
Status: DISCONTINUED | OUTPATIENT
Start: 2019-05-24 | End: 2019-05-25 | Stop reason: HOSPADM

## 2019-05-24 RX ORDER — ENOXAPARIN SODIUM 100 MG/ML
40 INJECTION SUBCUTANEOUS EVERY 24 HOURS
Status: DISCONTINUED | OUTPATIENT
Start: 2019-05-24 | End: 2019-05-24 | Stop reason: SDUPTHER

## 2019-05-24 RX ORDER — ONDANSETRON 2 MG/ML
4 INJECTION INTRAMUSCULAR; INTRAVENOUS
Status: DISCONTINUED | OUTPATIENT
Start: 2019-05-24 | End: 2019-05-24

## 2019-05-24 RX ORDER — NITROGLYCERIN 0.4 MG/1
0.4 TABLET SUBLINGUAL
Status: COMPLETED | OUTPATIENT
Start: 2019-05-24 | End: 2019-05-24

## 2019-05-24 RX ORDER — SODIUM CHLORIDE, SODIUM LACTATE, POTASSIUM CHLORIDE, CALCIUM CHLORIDE 600; 310; 30; 20 MG/100ML; MG/100ML; MG/100ML; MG/100ML
125 INJECTION, SOLUTION INTRAVENOUS CONTINUOUS
Status: DISCONTINUED | OUTPATIENT
Start: 2019-05-24 | End: 2019-05-25 | Stop reason: HOSPADM

## 2019-05-24 RX ADMIN — BICALUTAMIDE 50 MG: 50 TABLET ORAL at 11:07

## 2019-05-24 RX ADMIN — NITROGLYCERIN 0.4 MG: 0.4 TABLET, ORALLY DISINTEGRATING SUBLINGUAL at 03:39

## 2019-05-24 RX ADMIN — Medication 10 ML: at 10:14

## 2019-05-24 RX ADMIN — CLOPIDOGREL BISULFATE 75 MG: 75 TABLET ORAL at 10:39

## 2019-05-24 RX ADMIN — PRAVASTATIN SODIUM 20 MG: 20 TABLET ORAL at 22:36

## 2019-05-24 RX ADMIN — METOPROLOL TARTRATE 12.5 MG: 25 TABLET, FILM COATED ORAL at 10:39

## 2019-05-24 RX ADMIN — ONDANSETRON HYDROCHLORIDE 4 MG: 2 INJECTION, SOLUTION INTRAMUSCULAR; INTRAVENOUS at 05:09

## 2019-05-24 RX ADMIN — Medication 10 ML: at 22:37

## 2019-05-24 RX ADMIN — MORPHINE SULFATE 2 MG: 2 INJECTION, SOLUTION INTRAMUSCULAR; INTRAVENOUS at 13:24

## 2019-05-24 RX ADMIN — ENOXAPARIN SODIUM 40 MG: 40 INJECTION SUBCUTANEOUS at 10:14

## 2019-05-24 RX ADMIN — SODIUM CHLORIDE, SODIUM LACTATE, POTASSIUM CHLORIDE, AND CALCIUM CHLORIDE 125 ML/HR: 600; 310; 30; 20 INJECTION, SOLUTION INTRAVENOUS at 20:10

## 2019-05-24 RX ADMIN — Medication 10 ML: at 13:24

## 2019-05-24 RX ADMIN — Medication 10 ML: at 22:36

## 2019-05-24 RX ADMIN — MORPHINE SULFATE 4 MG: 2 INJECTION, SOLUTION INTRAMUSCULAR; INTRAVENOUS at 05:49

## 2019-05-24 RX ADMIN — LISINOPRIL 40 MG: 20 TABLET ORAL at 10:38

## 2019-05-24 RX ADMIN — AMLODIPINE BESYLATE 10 MG: 5 TABLET ORAL at 10:39

## 2019-05-24 RX ADMIN — ASPIRIN 81 MG: 81 TABLET ORAL at 10:38

## 2019-05-24 RX ADMIN — Medication 10 ML: at 18:10

## 2019-05-24 RX ADMIN — MORPHINE SULFATE 2 MG: 2 INJECTION, SOLUTION INTRAMUSCULAR; INTRAVENOUS at 18:10

## 2019-05-24 RX ADMIN — SODIUM CHLORIDE, SODIUM LACTATE, POTASSIUM CHLORIDE, AND CALCIUM CHLORIDE 125 ML/HR: 600; 310; 30; 20 INJECTION, SOLUTION INTRAVENOUS at 10:38

## 2019-05-24 NOTE — ED PROVIDER NOTES
80 y.o. male with past medical history significant for hypertension, diabetes, prostate cancer, myocardial infarct, and CAD who presents from home via EMS with chief complaint of chest pain. Pt reports having chest tightness that has been constant since yestuday morning. Pt reports that he woke up feeling tight in his chest and describes it as 8/10 in severity. Pt states that his sx have been accompanied by SOB. He notes that he took Asprin prior to arrival to ED. Pt notes a hx of open heart surgery and heart attack but state that his current sx are different from when he had the heart attack. Pt denies any vomiting, diarrhea or abdominal pain. There are no other acute medical concerns at this time. Social hx: Tobacco - Denies; ETOH - Denies   PCP: Carlo Truong MD  Cardiologist: Dr. Morrison Agus     Note written by Neris Bernstein, as dictated by Baldemar Vanegas DO 3:22 AM          The history is provided by the patient. No  was used.         Past Medical History:   Diagnosis Date    CAD (coronary artery disease) 08/21/2017    PCI and stent    [facility] ([facility] [address]     Prostate CA    Diabetes ([facility] [address]     Hypertension     Myocardial infarct ([facility] [address] 2000    Sleep apnea     wears C-PAP    Sleep disorder     Sleep Apnea C-PAP use at home        Past Surgical History:   Procedure Laterality Date    CARDIAC SURG PROCEDURE UNLIST  2000    CABG    HX COLECTOMY  2012    HX CORONARY STENT PLACEMENT      HX PROSTATECTOMY           Family History:   Problem Relation Age of Onset    Heart Disease Sister        Social History     Socioeconomic History    Marital status: SINGLE     Spouse name: Not on file    Number of children: Not on file    Years of education: Not on file    Highest education level: Not on file   Occupational History    Not on file   Social Needs    Financial resource strain: Not on file    Food insecurity:     Worry: Not on file     Inability: Not on file   [employer] needs:     Medical: Not on file     Non-medical: Not on file   Tobacco Use    Smoking status: Never Smoker    Smokeless tobacco: Never Used   Substance and Sexual Activity    Alcohol use: No    Drug use: Yes    Sexual activity: Not on file   Lifestyle    Physical activity:     Days per week: Not on file     Minutes per session: Not on file    Stress: Not on file   Relationships    Social connections:     Talks on phone: Not on file     Gets together: Not on file     Attends [employer] service: Not on file     Active member of club or organization: Not on file     Attends meetings of clubs or organizations: Not on file     Relationship status: Not on file    Intimate partner violence:     Fear of current or ex partner: Not on file     Emotionally abused: Not on file     Physically abused: Not on file     Forced sexual activity: Not on file   Other Topics Concern    Not on file   Social History Narrative    Not on file         ALLERGIES: Iodine    Review of Systems   Constitutional: Negative for activity change, appetite change, chills and fever. HENT: Negative for congestion, rhinorrhea, sinus pain, sneezing and sore throat. Eyes: Negative for photophobia and visual disturbance. Respiratory: Positive for shortness of breath. Negative for cough. Cardiovascular: Positive for chest pain. Gastrointestinal: Negative for abdominal pain, blood in stool, constipation, diarrhea, nausea and vomiting. Genitourinary: Negative for difficulty urinating, dysuria, flank pain, hematuria, penile pain and testicular pain. Musculoskeletal: Negative for arthralgias, back pain, myalgias and neck pain. Skin: Negative for rash and wound. Neurological: Negative for syncope, weakness, light-headedness, numbness and headaches. Psychiatric/Behavioral: Negative for self-injury and suicidal ideas. All other systems reviewed and are negative.       Vitals:    05/24/19 0312 05/24/19 0339   BP: 182/83 Pulse: 73 72   Resp: 16    Temp: 98.3 °F (36.8 °C)    SpO2: 94%    Weight: 107.4 kg (236 lb 12.4 oz)    Height: 5' 6\" (1.676 m)             Physical Exam   Constitutional: He is oriented to person, place, and time. He appears well-developed and well-nourished. No distress. HENT:   Head: Normocephalic and atraumatic. Nose: Nose normal.   Mouth/Throat: Oropharynx is clear and moist.   Eyes: Pupils are equal, round, and reactive to light. Conjunctivae and EOM are normal.   Neck: Neck supple. Cardiovascular: Normal rate, regular rhythm, normal heart sounds and intact distal pulses. Pulmonary/Chest: Effort normal and breath sounds normal. He exhibits tenderness (lower sternum). Midline sternotomy scar, well healed. Abdominal: Soft. He exhibits no distension. There is tenderness in the epigastric area. Obese   Musculoskeletal: He exhibits no edema or tenderness. Neurological: He is alert and oriented to person, place, and time. He has normal strength. No cranial nerve deficit or sensory deficit. Coordination normal. GCS eye subscore is 4. GCS verbal subscore is 5. GCS motor subscore is 6. Skin: Skin is warm and dry. No rash noted. He is not diaphoretic. Nursing note and vitals reviewed. MDM    80 y.o. male with extensive hx of CAD presents with epigastric and lower chest pain, reproducible with palpation. Given dose of nitroglycerin on arrival and he had no improvement in his pain. Given zofran. Labs drawn and returned showing neg troponin. Cr 1.52, lipase elevated at 704,   Other LFTs normal     CXR viewed by myself and read by radiology showing no acute abnormalities. CT abd plv shows mild pancreatitis. Given IV morphine and had some improvement in his sx. Procedures   3:11am EKG shows NSR with a rate of 72 bpm with occasional PAC's,with anterolateral T wabe inversion, with no acute BRIANNA or STD. No significant change from prior.      Hospitalist Iveth for Admission  5:38 AM    ED Room Number: VE04/27  Patient Name and age:  Tiera Ray 80 y.o.  male  Working Diagnosis:   1. Acute pancreatitis, unspecified complication status, unspecified pancreatitis type      Readmission: no  Isolation Requirements:  no  Recommended Level of Care:  telemetry  Code Status:  full  Other:  Given IV pain and nausea medication, hx of CAD s/p bipass, neg troponin.

## 2019-05-24 NOTE — ROUTINE PROCESS
1012:  Attempting to page hospitalist to page stat Cardiology consult. No  Note from cardiologist to confirm that he has seen patient. 1018: Flor Malia voiced she did not mean to order cardiology stat. She wanted it rountine. About , called ED staff looking for patient's medication list and was told she could not find it. Pt adamant and want list, called supervisor for assist and she deferred me to patient advocate. Dietary came to deliver lunch and he asked her to look for his medication list in the ED.  
 
1222: Shweta Clifton called to reported that she went to ED bed 11 looking for his medication list and she was not able to find it. Will update patient. 1600:  Obtain diet order from Dr. Sweta Levine 
 
1706:  [address] [phone] to have faxed over patient's current list of medication as pt lost his list in the ED.

## 2019-05-24 NOTE — H&P
[address]   HISTORY AND PHYSICAL    Name:  Bora Maya  MR#:  [id number]  :  10/30/1930  ACCOUNT #:  [de-identified]  ADMIT DATE:  2019      PRIMARY CARE PHYSICIAN:  Dr. Danette Hurt. PRIMARY CARDIOLOGIST:  Howard Smith MD    CHIEF COMPLAINT:  Chest pain. HISTORY OF PRESENT ILLNESS:  An 49-year-old gentleman with a past medical history significant for primary hypertension, coronary artery disease, myocardial infarction with stent placement, coronary artery bypass graft surgery, prostate cancer, prostatectomy, type 2 diabetes mellitus, colectomy and morbid obesity, was brought to the emergency room for evaluation of chest pain of approximately 1 day's duration. The patient described the chest pain as a tightness about 8/10 in intensity at its worst, at times with some slight radiation to the upper abdominal area. The patient did not recollect any particular aggravating or relieving factors. However, noted some associated shortness of breath especially whilst lying flat. The patient stated that pain felt different from previous chest pains. The patient denied associated headaches, dizziness, visual deficits, palpitations, nausea, vomiting, cough, fevers, chills, bladder or bowel irregularities. PAST MEDICAL HISTORY:  1.  Primary hypertension. 2.  Coronary artery disease. 3.  Type 2 diabetes mellitus. 4.  Prostate cancer. 5.  Myocardial infarction. 6.  Prostate cancer. 7.  Morbid obesity. PAST SURGICAL HISTORY:  1. Coronary artery bypass graft surgery. 2.  Colectomy. 3.  PCI with stent placement. 4.  Prostatectomy. HOME MEDICATIONS:  1.  Norvasc 10 mg p.o. daily. 2.  Aspirin 81 mg p.o. daily. 3.  Casodex 50 mg p.o. daily. 4.  Plavix 75 mg p.o. daily. 5.  Colace 100 mg p.o. nightly p.r.n. for constipation. 6.  Lisinopril 40 mg daily. 7.  Claritin 10 mg p.o. nightly. 8.  Robaxin 750 mg p.o. nightly. 9.  Metoprolol 25 mg p.o. daily.   10. Pravastatin 20 mg p.o. nightly. 11. Vitamin E 1 capsule p.o. nightly. ALLERGIES:  IODINE. SOCIAL HISTORY:  Significant for being , lives at home with his wife, ambulates unaided. Does not require assistance with activities and instrumental activities of daily living. Denies tobacco use disorder. Denies alcohol use disorder. FAMILY HISTORY:  Reviewed and positive for coronary artery disease. Negative for any premature coronary artery disease or death. Negative for malignancy. REVIEW OF SYSTEMS:  A 12-point system review conducted essentially negative, otherwise as outlined in the history of the presenting illness. PHYSICAL EXAMINATION:  GENERAL:  The patient was awake, alert, not in any overt distress. Denied chest pain at that time. VITAL SIGNS:  Blood pressure of 101/73, heart rate of 69, respiratory rate 16, afebrile, saturating 92% on room air. HEAD EXAM:  Normocephalic. Pupils equal and reactive to light. ENT EXAM:  Ear, nose, and throat clear. NECK EXAM:  No jugular venous distention or carotid bruits. CARDIOVASCULAR EXAM:  S1 and S2 present without any murmurs. RESPIRATORY EXAM:  Lungs with decreased air entry at both bases without any crepitations or rhonchi. GI EXAM:  Abdomen morbidly obese. Bowel sounds present. The abdomen is soft, nontender. No rebound, no guarding. GENITOURINARY EXAM:  No suprapubic or flank tenderness. MUSCULOSKELETAL EXAM:  No asymmetry of the extremities. CNS EXAM:  The patient was awake, alert, oriented to time, date, place, person. LABORATORY DATA/RADIOGRAPHIC FINDINGS:  As follows. Metabolic panel with a sodium of 144, potassium 3.7, chloride 111, bicarb of 25, BUN of 17, creatinine of 1.52, glucose of 103, calcium 9.3 with an albumin of 3.6, total bilirubin of 0.5, ALT of 15, AST of 13, alkaline phosphatase of 71 and lipase of 704. First and second troponin less than 0.05.   A CBC with a WBC of 6.6, hemoglobin 12.7, hematocrit 40.4 with a platelet count of 177. A CT of the abdomen and pelvis with contrast that showed mild pancreatitis, hepatic and bilateral renal cysts. A chest x-ray that showed no acute pneumonia, pneumothorax or mediastinal adenopathy. A 12-lead EKG personally reviewed that showed sinus rhythm with premature atrial complexes at 72 per minute. A most recent 2-D echocardiogram from 08/20/2017 with an ejection fraction of about 50%. ASSESSMENT AND PLAN:  1. Cardiovascular: The patient will be admitted to the inpatient level monitored floor for management of acute chest pain concerning for recurrent acute coronary syndrome. The patient with a significant cardiac history including coronary artery disease with bypass surgery, and myocardial infarction with stent placement, as well as uncontrolled primary hypertension. This patient is at increased risk for recurrent ischemic events and will benefit from further inpatient evaluation with  possible stress testing versus left heart catheterization. We will continue to trend high sensitivity troponins. We will put on aspirin, beta-blocker, statin, nitrates and obtain Cardiology consult. 2.  Gastrointestinal:  Probable mild pancreatitis with a moderately elevated lipase level. History of partial colectomy for GI bleed. Lactated Ringer's intravenous fluids, antiemetics, analgesia, and gastroenterology consult. 3.  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 4.  Endocrine: morbid obesity with a BMI greater than 40. Therapeutic lifestyle changes counseling done. Registered dietitian consult. 5.  Genitourinary:  History of benign prostatic hyperplasia. 6.  Oncology:  History of prostatectomy for prostate cancer. 7.  Analgesia: We will aim to optimize pain control with judicious use of opiates. 8.  Prophylaxis: Deep venous thrombolysis. 9.  Directives:  Full code with a guarded prognosis. Discussed with the patient.     In summary, an 80-year-old gentleman with multiple medical problems including a significant cardiac history, is being admitted to the inpatient level monitored floor for a chest pain concerning for acute coronary syndrome and probable mild-to-moderate pancreatitis of unclear etiology. This patient is at increased risk of further decompensation and will benefit from inpatient management including with ischemic workup, bowel rest, IV lactated Ringer's fluid, IV analgesics, Cardiology and Gastroenterology consults. The entire admission plans discussed in detail with the patient and the patient's wife Aurelia Álvarez who can be reached at [phone]. All questions and concerns were addressed. The patient's functional status prior to admission significant for the patient being able to ambulate unaided. Total time for admission 50 minutes of which at least 50% of the time was spent in plan of care and counseling of the patient.       Nilda Matute MD      [id number]  D:  05/24/2019 8:57  T:  05/24/2019 9:07  JOB #:  [id number]

## 2019-05-24 NOTE — CONSULTS
Cardiology Note dictated # [id number]  Impression:  Atypical CP: Has long history of CAD with CABG in 1999 and PCI most recently in 2017  Has always had normal EF and regional wall motion, including today's echocardiogram  Today's EKG is unchanged from the most recent tracing in my office from 9/19/18   Has been found to have pancreatitis  Recommendations:  No further cardiac evaluation is needed at this time.   I will follow up with the patient in my office in a couple of weeks  Thank you for this referral  Eliecer Rudolph MD

## 2019-05-24 NOTE — ED TRIAGE NOTES
Pt arrives via EMS from home c/o substernal nonradiating tight CP that worsens with palpation x 24 hours and SOB when laying down x hours. Pt reports taking six baby aspirin at one time for the pain, no relief. Hx of MI, states this is not the same.  Dr. Luba Lema is cardiologist

## 2019-05-24 NOTE — ROUTINE PROCESS
TRANSFER - IN REPORT: 
 
Verbal report received from [facility] (name) on Kamilla Braxton  being received from ED (unit) for routine progression of care Report consisted of patients Situation, Background, Assessment and  
Recommendations(SBAR). Information from the following report(s) SBAR, MAR and Cardiac Rhythm NSR PAC was reviewed with the receiving nurse. Opportunity for questions and clarification was provided. Assessment completed upon patients arrival to unit and care assumed.

## 2019-05-24 NOTE — ACP (ADVANCE CARE PLANNING)
Advance Care Planning Note    Name: Derrek Mariscal  YOB: 1930  MRN: [id number]  Admission Date: 5/24/2019  3:04 AM    Date of discussion: 5/24/2019    Active Diagnoses:    Hospital Problems  Date Reviewed: 5/24/2019          Codes Class Noted POA    Acute pancreatitis ICD-10-CM: K85.90  ICD-9-CM: 968.4  5/24/2019 Unknown        Pancreatitis ICD-10-CM: K85.90  ICD-9-CM: 829.5  5/24/2019 Unknown        Chest pain ICD-10-CM: R07.9  ICD-9-CM: 786.50  5/24/2019 Yes        Morbid obesity ([facility] [address] ICD-10-CM: E66.01  ICD-9-CM: 278.01  5/24/2019 Unknown        Uncontrolled hypertension ICD-10-CM: I10  ICD-9-CM: 401.9  5/24/2019 Yes               These active diagnoses are of sufficient risk that focused discussion on advance care planning is indicated in order to allow the patient to thoughtfully consider personal goals of care, and if situations arise that prevent the ability to personally give input, to ensure appropriate representation of their personal desires for different levels and aggressiveness of care. Discussion:     Persons present and participating in discussion: Derrek Mariscal, and Demetra Bautista MD.    Discussion: Addressed decompensated medical problems, Co-morbidities, Advance Directives, Prognosis and confirmation of a Surrogate Decision Maker. Time Spent:     Total time spent face-to-face in education and discussion: 16 minutes.      Demetra Bautista MD  5/24/2019  8:58 AM

## 2019-05-24 NOTE — ROUTINE PROCESS
Primary Nurse Brandon Santiago RN and Rusty Fair RN performed a dual skin assessment on this patient No impairment noted Cabrera score is 23

## 2019-05-24 NOTE — ED NOTES
Verbal shift change report given to [facility] (oncoming nurse) by [address] RN (offgoing nurse). Report included the following information SBAR, ED Summary, Intake/Output, MAR, Recent Results and Cardiac Rhythm sinus with PACs.

## 2019-05-24 NOTE — ROUTINE PROCESS
TRANSFER - OUT REPORT: 
 
Verbal report given to Divine JEAN(name) on Ritesh Torres  being transferred to [facility](unit) for routine progression of care Report consisted of patients Situation, Background, Assessment and  
Recommendations(SBAR). Information from the following report(s) SBAR, Kardex, ED Summary and MAR was reviewed with the receiving nurse. Lines:  
Peripheral IV 05/24/19 Left Antecubital (Active) Site Assessment Clean, dry, & intact 5/24/2019  3:17 AM  
Phlebitis Assessment 0 5/24/2019  3:17 AM  
Infiltration Assessment 0 5/24/2019  3:17 AM  
Dressing Status Clean, dry, & intact 5/24/2019  3:17 AM  
Dressing Type Transparent 5/24/2019  3:17 AM  
Hub Color/Line Status Pink;Patent; Flushed 5/24/2019  3:17 AM  
Action Taken Blood drawn 5/24/2019  3:17 AM  
  
 
Opportunity for questions and clarification was provided. Patient transported with: 
 [employer]

## 2019-05-24 NOTE — PROGRESS NOTES
Admission Medication Reconciliation:    Information obtained from:  Patient/RxQuery    Comments/Recommendations: Updated PTA meds/reviewed patient's allergies. 1)  Spoke with patient. He was not a very good historian and did not provide much information in regards to PTA medications. He had a discharge summary list with him that he insisted I refer to, however this list appeared to be from 2017. He was complaining of leg cramps while I was in the room and did not engage much in interview. 2)  Medication changes (since last review):    Removed  - Xyxal, Nasacort, betamethasone cream    3)  Medication reconciliation completed to the best of my ability with limited information provided by patient. Rx Query utilized to confirm dosing of maintenance medications/         Allergies:  Iodine    Significant PMH/Disease States:   Past Medical History:   Diagnosis Date    CAD (coronary artery disease) 08/21/2017    PCI and stent    Cancer ([facility] [address]     Prostate CA    Diabetes ([facility] [address]     Hypertension     Myocardial infarct ([facility] [address] 2000    Sleep apnea     wears C-PAP    Sleep disorder     Sleep Apnea C-PAP use at home        Chief Complaint for this Admission:    Chief Complaint   Patient presents with    Chest Pain       Prior to Admission Medications:   Prior to Admission Medications   Prescriptions Last Dose Informant Patient Reported? Taking? VITAMIN E/QUININE SULFATE (LEG CRAMP TREATMENT PO)   Yes Yes   Sig: Take 1 Cap by mouth nightly. Filiberto's leg cramps   amLODIPine (NORVASC) 10 mg tablet   Yes Yes   Sig: Take 10 mg by mouth daily. aspirin delayed-release 81 mg tablet   No Yes   Sig: Take 1 Tab by mouth daily. bicalutamide (CASODEX) 50 mg tablet   Yes Yes   Sig: Take 50 mg by mouth daily. clopidogrel (PLAVIX) 75 mg tab   No Yes   Sig: Take 1 Tab by mouth daily. Indications:  Thrombosis Prevention after PCI   docusate sodium (COLACE) 100 mg capsule   Yes Yes   Sig: Take 100 mg by mouth nightly as needed for Constipation. lisinopril (PRINIVIL, ZESTRIL) 40 mg tablet   Yes Yes   Sig: Take 40 mg by mouth daily. loratadine (CLARITIN) 10 mg tablet   Yes Yes   Sig: Take 10 mg by mouth nightly as needed for Allergies. methocarbamol (ROBAXIN) 750 mg tablet   Yes Yes   Sig: Take 750 mg by mouth nightly. metoprolol tartrate (LOPRESSOR) 25 mg tablet   No Yes   Sig: Take 0.5 Tabs by mouth daily. pravastatin (PRAVACHOL) 20 mg tablet   Yes Yes   Sig: Take 20 mg by mouth nightly.       Facility-Administered Medications: None       Sukhi Garrett, PharmD

## 2019-05-24 NOTE — PROGRESS NOTES
Reason for Admission:  The patient presented with chest pain and tightness                   RRAT Score:  13                Do you (patient/family) have any concerns for transition/discharge? None identified at this time                  Plan for utilizing home health:  TBD pending medical progress, no skilled nursing or therapy needs identified at this time. The patient denies any current home health services     Current Advanced Directive/Advance Care Plan:  Full code, not on file     Likelihood of readmission? Moderate            Transition of Care Plan: Home    The CM met with the patient at bedside in order to introduce the role of CM and assess for patient needs. The patient lives at home with his wife, verified demographics and insurance information. The patient endorses being independent with ADLs and mobility, denied use of assistive devices- endorses that the patient is still driving. The patient utilizes CPAP machine at home. The patient denied difficulty affording or accessing medications prior to hospitalization- utilizes [employer] off of [address] in [place]. The patient has two adult daughters and grandchild that live locally. The patient endorses that family will transport home upon discharge. CM will continue to follow for transitions of care. Delmas Angelucci, MSW    Care Management Interventions  PCP Verified by CM: Yes(Patient verified PCP as Dr. Rashida Rider)  Mode of Transport at Discharge:  Other (see comment)(Family will transport home upon discharge)  Transition of Care Consult (CM Consult): Discharge Planning  MyChart Signup: No  Discharge Durable Medical Equipment: No  Health Maintenance Reviewed: Yes  Physical Therapy Consult: No  Occupational Therapy Consult: No  Speech Therapy Consult: No  Current Support Network: Lives with Spouse, Own Home  Confirm Follow Up Transport: Family  Plan discussed with Pt/Family/Caregiver: Yes  Premier Resource Information Provided?: No  Discharge Location  Discharge Placement: Home

## 2019-05-24 NOTE — CONSULTS
[address]  [address] [address] [address], [address]   [phone]                     GI CONSULTATION NOTE  Gavin Jaimes, AGACN[facility]    NAME:  Clifford Maria   :   10/30/1930   MRN:   [id number]       Referring Provider: Dr. Zachariah Fu    Consult Date: 2019     Chief Complaint: Pancreatitis    History of Present Illness: 80 very pleasant AA male with history of HTN, CAD s/p CABG, hemicolectomy for diverticulosis seen in consultation for above complaint. He reports Thursday evening with a tightness in mid chest that felt like a blown up balloon. Non radiating. No pain just tightness. Took 6 baby ASA with no relief. Some shortness of breath with it. No nausea or vomiting. No triggering factors. He denies history of this discomfort previously. Takes 2 baby ASA daily. Does not drink or smoke. Has lost about 15 pound intentionally. Previous endoscopies ? CT a/p w/o with mild pancreatitis, lipase 704. PMH:  Past Medical History:   Diagnosis Date    CAD (coronary artery disease) 2017    PCI and stent    Cancer ([facility] [address]     Prostate CA    Diabetes ([facility] [address]     Hypertension     Myocardial infarct ([facility] [address]     Sleep apnea     wears C-PAP    Sleep disorder     Sleep Apnea C-PAP use at home        PSH:  Past Surgical History:   Procedure Laterality Date    CARDIAC SURG PROCEDURE UNLIST      CABG    HX COLECTOMY      HX CORONARY STENT PLACEMENT      HX PROSTATECTOMY         Allergies: Allergies   Allergen Reactions    Iodine Other (comments)     \"it gives me a headache\"       Home Medications:  Prior to Admission Medications   Prescriptions Last Dose Informant Patient Reported? Taking? VITAMIN E/QUININE SULFATE (LEG CRAMP TREATMENT PO) 2019 at Unknown time  Yes Yes   Sig: Take 1 Cap by mouth nightly. Filiberto's leg cramps   amLODIPine (NORVASC) 10 mg tablet 2019 at Unknown time  Yes Yes   Sig: Take 10 mg by mouth daily.    ascorbic acid, vitamin C, (VITAMIN C) 500 mg tablet 5/23/2019 at Unknown time  Yes Yes   Sig: Take 500 mg by mouth daily. aspirin delayed-release 81 mg tablet 5/24/2019 at Unknown time  No Yes   Sig: Take 1 Tab by mouth daily. bicalutamide (CASODEX) 50 mg tablet 5/24/2019 at Unknown time  Yes Yes   Sig: Take 50 mg by mouth daily. clopidogrel (PLAVIX) 75 mg tab 5/24/2019 at Unknown time  No Yes   Sig: Take 1 Tab by mouth daily. Indications: Thrombosis Prevention after PCI   docusate sodium (COLACE) 100 mg capsule 5/23/2019 at Unknown time  Yes Yes   Sig: Take 100 mg by mouth nightly as needed for Constipation. lisinopril (PRINIVIL, ZESTRIL) 40 mg tablet 5/24/2019 at Unknown time  Yes Yes   Sig: Take 40 mg by mouth daily. metoprolol tartrate (LOPRESSOR) 25 mg tablet 5/24/2019 at Unknown time  No Yes   Sig: Take 0.5 Tabs by mouth daily. pravastatin (PRAVACHOL) 20 mg tablet 5/23/2019 at Unknown time  Yes Yes   Sig: Take 20 mg by mouth nightly.       Facility-Administered Medications: None       Hospital Medications:  Current Facility-Administered Medications   Medication Dose Route Frequency    sodium chloride (NS) flush 5-40 mL  5-40 mL IntraVENous Q8H    sodium chloride (NS) flush 5-40 mL  5-40 mL IntraVENous PRN    enoxaparin (LOVENOX) injection 40 mg  40 mg SubCUTAneous Q24H    HYDROmorphone (PF) (DILAUDID) injection 1 mg  1 mg IntraVENous Q3H PRN    lactated Ringers infusion  200 mL/hr IntraVENous CONTINUOUS    sodium chloride (NS) flush 5-40 mL  5-40 mL IntraVENous Q8H    sodium chloride (NS) flush 5-40 mL  5-40 mL IntraVENous PRN    acetaminophen (TYLENOL) tablet 650 mg  650 mg Oral Q4H PRN    ondansetron (ZOFRAN) injection 4 mg  4 mg IntraVENous Q6H PRN    lactated Ringers infusion  125 mL/hr IntraVENous CONTINUOUS    morphine injection 2 mg  2 mg IntraVENous Q4H PRN    amLODIPine (NORVASC) tablet 10 mg  10 mg Oral DAILY    aspirin delayed-release tablet 81 mg  81 mg Oral DAILY    bicalutamide (CASODEX) tablet 50 mg  50 mg Oral DAILY    clopidogrel (PLAVIX) tablet 75 mg  75 mg Oral DAILY    docusate sodium (COLACE) capsule 100 mg  100 mg Oral QHS PRN    lisinopril (PRINIVIL, ZESTRIL) tablet 40 mg  40 mg Oral DAILY    loratadine (CLARITIN) tablet 10 mg  10 mg Oral QHS PRN    metoprolol tartrate (LOPRESSOR) tablet 12.5 mg  12.5 mg Oral DAILY    pravastatin (PRAVACHOL) tablet 20 mg  20 mg Oral QHS       Social History:  Social History     Tobacco Use    Smoking status: Never Smoker    Smokeless tobacco: Never Used   Substance Use Topics    Alcohol use: No       Family History:  Family History   Problem Relation Age of Onset    Heart Disease Sister        Review of Systems:    Constitutional: negative fever, negative chills, negative weight loss  Eyes:   negative visual changes  ENT:   negative sore throat, tongue or lip swelling  Respiratory:  negative cough, negative dyspnea  Cards:  negative for chest pain, palpitations, lower extremity edema  GI:   See HPI  :  negative for frequency, dysuria  Integument:  negative for rash and pruritus  Heme:  negative for easy bruising and gum/nose bleeding  Musculoskel: negative for myalgias, back pain and muscle weakness  Neuro:  negative for headaches, dizziness, vertigo  Psych: negative for feelings of anxiety, depression      Objective:     Patient Vitals for the past 8 hrs:   BP Temp Pulse Resp SpO2 Height Weight   05/24/19 1000      5' 6\" (1.676 m) 115.8 kg (255 lb 4.7 oz)   05/24/19 0914     92 %     05/24/19 0912 166/83 97.4 °F (36.3 °C) 68 18 91 %     05/24/19 0800 101/73 98.1 °F (36.7 °C) 69 16 92 %     05/24/19 0700 165/66 98.3 °F (36.8 °C) 70 16 90 %       05/24 0701 - 05/24 1900  In: 731.3 [P.O.:600; I.V.:131.3]  Out: -   No intake/output data recorded. PHYSICAL EXAM:  General: Well developed, Well nourished. AA male, Alert, cooperative, no acute distress.    HEENT: Normocephalic, Atraumatic. PERRLA, EOMI. Anicteric sclerae.   Lungs:  CTA Bilaterally. No Wheezing/Rhonchi/Rales. Heart:  Regular rate and rhythm, No murmur, No Rubs, No Gallops. Median sternotomy scar  Abdomen: Soft, Obese, non-tender.  +Bowel sounds, no hepatomegaly  Extremities: No cyanosis,clubing or edema  Neurologic:  Alert and oriented X 3. No acute neurological distress. Psych:   Good insight. Not anxious nor agitated. Data Review     Recent Labs     05/24/19 0319   WBC 6.6   HGB 12.7   HCT 40.4        Recent Labs     05/24/19 0319      K 3.7   *   CO2 25   BUN 17   CREA 1.52*   *   CA 9.3     Recent Labs     05/24/19 0319   SGOT 13*   AP 71   TP 7.4   ALB 3.6   GLOB 3.8   LPSE 704*     No results for input(s): INR, PTP, APTT in the last 72 hours. No lab exists for component: INREXT     Imaging studies reviewed        Assessment:   1. Mild pancreatitis-unknown etiology. Normal LFTs. Pain free currently.        Patient Active Problem List   Diagnosis Code    Unstable angina ([facility] [address] I20.0    Acute pancreatitis K85.90    Pancreatitis K85.90    Chest pain R07.9    Morbid obesity ([facility] [address] E66.01    Uncontrolled hypertension I10                  Plan:   -Abd US  -Supportive care acutely  -Agree with cardiology consult  -Further recommendations to follow  -Discussed with Dr. Zachary Moran  -Will follow along with you  -Thank you kindly for allowing us to participate in the care of this patient

## 2019-05-25 VITALS
OXYGEN SATURATION: 93 % | TEMPERATURE: 97.5 F | HEIGHT: 66 IN | SYSTOLIC BLOOD PRESSURE: 155 MMHG | HEART RATE: 67 BPM | WEIGHT: 255.29 LBS | DIASTOLIC BLOOD PRESSURE: 69 MMHG | RESPIRATION RATE: 16 BRPM | BODY MASS INDEX: 41.03 KG/M2

## 2019-05-25 LAB
ALBUMIN SERPL-MCNC: 3.2 G/DL (ref 3.5–5)
ALBUMIN/GLOB SERPL: 0.9 {RATIO} (ref 1.1–2.2)
ALP SERPL-CCNC: 63 U/L (ref 45–117)
ALT SERPL-CCNC: 13 U/L (ref 12–78)
ANION GAP SERPL CALC-SCNC: 5 MMOL/L (ref 5–15)
AST SERPL-CCNC: 13 U/L (ref 15–37)
BILIRUB SERPL-MCNC: 0.7 MG/DL (ref 0.2–1)
BUN SERPL-MCNC: 11 MG/DL (ref 6–20)
BUN/CREAT SERPL: 10 (ref 12–20)
CALCIUM SERPL-MCNC: 8.7 MG/DL (ref 8.5–10.1)
CHLORIDE SERPL-SCNC: 110 MMOL/L (ref 97–108)
CO2 SERPL-SCNC: 29 MMOL/L (ref 21–32)
CREAT SERPL-MCNC: 1.05 MG/DL (ref 0.7–1.3)
GLOBULIN SER CALC-MCNC: 3.7 G/DL (ref 2–4)
GLUCOSE BLD STRIP.AUTO-MCNC: 102 MG/DL (ref 65–100)
GLUCOSE SERPL-MCNC: 98 MG/DL (ref 65–100)
LIPASE SERPL-CCNC: 132 U/L (ref 73–393)
POTASSIUM SERPL-SCNC: 3.9 MMOL/L (ref 3.5–5.1)
PROT SERPL-MCNC: 6.9 G/DL (ref 6.4–8.2)
SERVICE CMNT-IMP: ABNORMAL
SODIUM SERPL-SCNC: 144 MMOL/L (ref 136–145)

## 2019-05-25 PROCEDURE — 36415 COLL VENOUS BLD VENIPUNCTURE: CPT

## 2019-05-25 PROCEDURE — 83690 ASSAY OF LIPASE: CPT

## 2019-05-25 PROCEDURE — 74011250637 HC RX REV CODE- 250/637: Performed by: INTERNAL MEDICINE

## 2019-05-25 PROCEDURE — 80053 COMPREHEN METABOLIC PANEL: CPT

## 2019-05-25 PROCEDURE — 74011250636 HC RX REV CODE- 250/636: Performed by: INTERNAL MEDICINE

## 2019-05-25 PROCEDURE — 82962 GLUCOSE BLOOD TEST: CPT

## 2019-05-25 RX ADMIN — CLOPIDOGREL BISULFATE 75 MG: 75 TABLET ORAL at 09:35

## 2019-05-25 RX ADMIN — METOPROLOL TARTRATE 12.5 MG: 25 TABLET, FILM COATED ORAL at 09:35

## 2019-05-25 RX ADMIN — ASPIRIN 81 MG: 81 TABLET ORAL at 09:35

## 2019-05-25 RX ADMIN — AMLODIPINE BESYLATE 10 MG: 5 TABLET ORAL at 09:35

## 2019-05-25 RX ADMIN — LISINOPRIL 40 MG: 20 TABLET ORAL at 09:35

## 2019-05-25 RX ADMIN — Medication 10 ML: at 05:51

## 2019-05-25 RX ADMIN — BICALUTAMIDE 50 MG: 50 TABLET ORAL at 09:00

## 2019-05-25 RX ADMIN — SODIUM CHLORIDE, SODIUM LACTATE, POTASSIUM CHLORIDE, AND CALCIUM CHLORIDE 125 ML/HR: 600; 310; 30; 20 INJECTION, SOLUTION INTRAVENOUS at 04:33

## 2019-05-25 NOTE — DISCHARGE SUMMARY
[address] [address] SUMMARY    Name:  Anjelica Majano  MR#:  [id number]  :  10/30/1930  ACCOUNT #:  [de-identified]  ADMIT DATE:  2019  DISCHARGE DATE:  2019      MAIN DIAGNOSES ON ADMISSION:  1. Chest pain. 2.  Mild-to-moderate acute pancreatitis. CONSULTANTS:  1. Gastroenterology. 2.  Cardiology. HOSPITAL COURSE:  An 80-year-old gentleman with past medical history significant for primary hypertension, coronary artery disease, coronary artery bypass graft surgery, myocardial infarction with stent placement, prostate cancer, prostatectomy, type 2 diabetes mellitus, partial colectomy for GI bleed, and morbid obesity, who was brought to the emergency room for evaluation of chest of approximately one days of duration. The patient described the chest pain as a tightness about 8/10 in intensity at its worst, at times with some slight radiation to the upper abdominal urea. The patient did not recollect any particular aggravating or relieving factors. However, noted some associated shortness of breath especially while lying flat. The patient stated that pain felt different from previous episodes of chest pains. Initial hospital course significant for the patient being admitted to the monitored floor after being evaluated with a metabolic panel that showed a sodium of 144, potassium of 3.7, chloride 111, bicarb 25, BUN of 17 and creatinine 1.52, glucose of 103, calcium of 9.3, albumin 3.6, total bilirubin 0.5, ALT 15, AST 13, alkaline phosphatase 71, lipase 704. First and second troponins of less than 0.05. CBC with a WBC of 6.6, hemoglobin 12.7, hematocrit 40.4 with a platelet count of 875. A CT of the abdomen and pelvis with contrast that showed mild pancreatitis, hepatic and bilateral renal cysts. Chest x-ray that showed no acute pneumonia, pneumothorax or mediastinal adenopathy.   A 12-lead EKG that was positive for sinus rhythm with premature atrial complexes at 72 per minute. A most recent 2-D echocardiogram from 08/20/2017 with an ejection fraction of about 50%. The patient was initially managed with oxygen, continuation of aspirin, beta-blocker, statin and nitrates therapy. The patient was initially put on a liquid diet with antiemetics and analgesics for the probable mild-to-moderate pancreatitis. The patient was evaluated by the gastroenterology service who concurred with medical management and further evaluation with an abdominal ultrasound which showed no evidence of bile stone or biliary dilation. Recommendations were for the patient to follow up outpatient gastroenterology service should there be any recurrent of abdominal pain and elevated lipase level. The patient was seen by his primary cardiologist, Dr. Estrella Rosario, who recommended continuation of medical management as chest pain was atypical in nature. The patient had no evidence of acute ischemia, 2-D echocardiogram noted with an ejection fraction of 61-65% and no wall motions abnormalities. The patient was advanced to a low-fat and low-cholesterol diet, was able to ambulate unaided and after being cleared by gastroenterology and cardiology services, he was deemed stable for discharge to home this day, 05/25/2019. PHYSICAL EXAMINATION:  Examination findings on this day of discharge;  GENERAL:  The patient awake, alert, denying new complaints. VITAL SIGNS:  Blood pressure of 155/69, heart rate of 67, respiratory rate 16, afebrile, and saturating 93% on room air. HEAD:  Normocephalic. Pupils equal and reactive to light. ENT:  Ear, nose, throat clear. NECK:  No jugular venous distention or carotid bruits. CARDIOVASCULAR:  S1 and S2 present. RESPIRATORY:  Lungs with decreased air entry at both bases. GI:  Bowel sounds are present. The abdomen is soft and nontender. No rebound, no guarding. GENITOURINARY:  No suprapubic or flank tenderness.   MUSCULOSKELETAL:  No asymmetry of the extremities. CNS:  The patient is awake, alert, oriented to time, date, place, person. LABORATORY FINDINGS ON DISCHARGE:  BMP with sodium of 144, potassium 3.9, chloride of 110, bicarb of 29, BUN of 11, creatinine 1.05 with a glucose of 98, calcium 8.7, lipase level of 132. FINAL DIAGNOSES ON DISCHARGE:  1. Resolved atypical chest pain without any acute ischemia. 2.  Resolving mild-to-moderate acute pancreatitis of unclear etiology. 3.  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 4.  Morbid obesity with a BMI greater than 40. Therapeutic lifestyle changes counseling done. 5.  History of benign prostatic hypertrophy. 6.  History of prostatectomy for prostate cancer. 7.  History of partial colectomy for gastrointestinal bleed. DISCHARGE MEDICATIONS:  The patient is to continue:  1. Amlodipine 10 mg p.o. daily. 2.  Aspirin 81 mg p.o. daily. 3.  Bicalutamide 50 mg p.o. daily. 4.  Plavix 75 mg p.o. daily. 5.  Colace 100 mg p.o. nightly as needed for constipation. 6.  Lisinopril 40 mg p.o. daily. 7.  Methocarbamol 750 mg p.o. nightly. 8.  Metoprolol 12.5 mg p.o. daily. 9.  Pravastatin 20 mg p.o. nightly. 10.  Vitamin C 500 mg p.o. daily. DISCHARGE INSTRUCTIONS:  The patient was instructed to follow a low-fat and low-cholesterol diet; to do daily activities as tolerated; to continue taking all regularly prescribed medications; to follow up with primary cardiologist, Dr. Severo Spaniel in one to two weeks' time from discharge; to follow up with gastroenterologist, Dr. Isaias Staples in 2-3 weeks' time from discharge; and primary care physician in one week's time from discharge. The patient is to call the offices for these appointments on the next business day. The entire discharge plans including all medications, their side effects, the importance of adhering to all outpatient followup plans discussed in detail with the patient.   All questions and concerns were addressed. Total time for the discharge summary 50 minutes.         Cirilo Adames MD      [id number]  D:  05/25/2019 11:28  T:  05/25/2019 11:39  JOB #:  [id number]

## 2019-05-25 NOTE — PROGRESS NOTES
Discharge orders placed. Instructions and med rec reviewed w/ pt. Follow up appts discussed. Questions/concerns addressed at this time. PIV (x1) removed. Telemetry removed and returned. Personal belongings gathered w/ pt. PCT safely escorted pt off unit w/ family.

## 2019-05-25 NOTE — ROUTINE PROCESS
Bedside and Verbal shift change report given to Yu Carson RN (oncoming nurse) by Ulysses West RN (offgoing nurse). Report included the following information SBAR, Kardex, MAR, Recent Results and Cardiac Rhythm NSR.

## 2019-05-25 NOTE — PROGRESS NOTES
GI PROGRESS NOTE    NAME:             Sia Spencer   :              10/30/1930   MRN:              [id number]   Admit Date:     2019  Todays Date:  2019      Subjective:      Tolerating diet     Abdominal pain:none  Nausea: none   Vomiting: none     Review of Systems - Cardiovascular ROS: no chest pain or dyspnea on exertion  Gastrointestinal ROS: no abdominal pain, change in bowel habits, or black or bloody stools  Medications-reviewed     Current Facility-Administered Medications   Medication Dose Route Frequency    sodium chloride (NS) flush 5-40 mL  5-40 mL IntraVENous Q8H    sodium chloride (NS) flush 5-40 mL  5-40 mL IntraVENous PRN    enoxaparin (LOVENOX) injection 40 mg  40 mg SubCUTAneous Q24H    HYDROmorphone (PF) (DILAUDID) injection 1 mg  1 mg IntraVENous Q3H PRN    lactated Ringers infusion  200 mL/hr IntraVENous CONTINUOUS    sodium chloride (NS) flush 5-40 mL  5-40 mL IntraVENous Q8H    sodium chloride (NS) flush 5-40 mL  5-40 mL IntraVENous PRN    acetaminophen (TYLENOL) tablet 650 mg  650 mg Oral Q4H PRN    ondansetron (ZOFRAN) injection 4 mg  4 mg IntraVENous Q6H PRN    lactated Ringers infusion  125 mL/hr IntraVENous CONTINUOUS    morphine injection 2 mg  2 mg IntraVENous Q4H PRN    amLODIPine (NORVASC) tablet 10 mg  10 mg Oral DAILY    aspirin delayed-release tablet 81 mg  81 mg Oral DAILY    bicalutamide (CASODEX) tablet 50 mg  50 mg Oral DAILY    clopidogrel (PLAVIX) tablet 75 mg  75 mg Oral DAILY    docusate sodium (COLACE) capsule 100 mg  100 mg Oral QHS PRN    lisinopril (PRINIVIL, ZESTRIL) tablet 40 mg  40 mg Oral DAILY    loratadine (CLARITIN) tablet 10 mg  10 mg Oral QHS PRN    metoprolol tartrate (LOPRESSOR) tablet 12.5 mg  12.5 mg Oral DAILY    pravastatin (PRAVACHOL) tablet 20 mg  20 mg Oral QHS        Objective:     Patient Vitals for the past 8 hrs:   BP Temp Pulse Resp SpO2 Weight   19 0349      115.8 kg (255 lb 4.7 oz) 05/25/19 0329 157/67 98.1 °F (36.7 °C) 79 16 98 %      No intake/output data recorded. 05/23 1901 - 05/25 0700  In: 3824.6 [P.O.:1560; I.V.:2264.6]  Out: 2300 [Urine:2300]    EXAM:    Visit Vitals  /67   Pulse 79   Temp 98.1 °F (36.7 °C)   Resp 16   Ht 5' 6\" (1.676 m)   Wt 115.8 kg (255 lb 4.7 oz)   SpO2 98%   BMI 41.21 kg/m²     General appearance: alert, cooperative, no distress, appears stated age  Lungs: clear to auscultation bilaterally  Heart: regular rate and rhythm, S1, S2 normal, no murmur, click, rub or gallop  Abdomen: soft, non-tender. Bowel sounds normal. No masses,  no organomegaly      Data Review     Recent Labs     05/24/19 0319   WBC 6.6   HGB 12.7   HCT 40.4        Recent Labs     05/25/19 0117 05/24/19 0319    144   K 3.9 3.7   * 111*   CO2 29 25   BUN 11 17   CREA 1.05 1.52*   GLU 98 103*   CA 8.7 9.3     Recent Labs     05/25/19 0117 05/24/19 0319   SGOT 13* 13*   AP 63 71   TP 6.9 7.4   ALB 3.2* 3.6   GLOB 3.7 3.8   LPSE 132 704*                              Assessment:   1. Pancreatitis         Active Problems:    Acute pancreatitis (5/24/2019)      Pancreatitis (5/24/2019)      Chest pain (5/24/2019)      Morbid obesity ([address] (5/24/2019)      Uncontrolled hypertension (5/24/2019)        Plan:   Presentation likely mild uncomplicated pancreatitis albeit with slightly atypical presentation. Tolerating diet. Can go from a GI standpoint. If recurrent pancreatitis in future consider MR/MRCP vs EUS to rule out pancreatic abnormality.  Follow up with Dr. Walt Mosley as outpatient         Manuel Hardin MD

## 2019-05-25 NOTE — ROUTINE PROCESS
Bedside and Verbal shift change report given to Brenda Cueto (oncoming nurse) by Loni Power (offgoing nurse). Report included the following information SBAR, Kardex, ED Summary, Intake/Output, MAR and Cardiac Rhythm NSR.

## 2019-05-25 NOTE — PROGRESS NOTES
Problem: Pancreatitis  Goal: *Control of acute pain  Outcome: Resolved/Met  Goal: *Absence of nausea/vomiting  Outcome: Resolved/Met  Goal: *Optimize nutritional status  Outcome: Resolved/Met  Goal: *Labs within defined limits  Outcome: Resolved/Met     Problem: Patient Education: Go to Patient Education Activity  Goal: Patient/Family Education  Outcome: Resolved/Met     Problem: Falls - Risk of  Goal: *Absence of Falls  Description  Document J Luis Fall Risk and appropriate interventions in the flowsheet.   Outcome: Resolved/Met     Problem: Patient Education: Go to Patient Education Activity  Goal: Patient/Family Education  Outcome: Resolved/Met

## 2019-05-25 NOTE — CONSULTS
[address]    Name:  Marylee Kells  MR#:  [id number]  :  10/30/1930  ACCOUNT #:  [de-identified]  DATE OF SERVICE:  2019      REFERRING PHYSICIAN:  David Sánchez MD.    HISTORY OF PRESENT ILLNESS:  The patient is well known to me for many many years, apparently over 21 years. The patient's heart disease dating back to  when he had bypass surgery, followed prior to this admission with PCI when the LIMA failed. He has done very well over the years. He did not have PCI of the RCA, and LAD last year. His ejection fraction has always remained normal.  He has hypertension, sleep apnea, diabetes. He was admitted with epigastric fullness and discomfort that awoke him early this morning, it did not get better, it got worse and he came to the hospital.  His cardiac workup has been negative for an acute event. He does have an elevated lipase and has been diagnosed with pancreatitis. He is feeling better with the measures instituted by the GI team and the hospitalist team.  He is not having any more chest pain. He has no dyspnea, diaphoresis or other significant symptoms. PHYSICAL EXAMINATION:  GENERAL:  This is a well-developed, pleasant, moderately obese, elderly man. VITAL SIGNS:  As follows:  Temperature 97.6, pulse 60, respirations 17, blood pressure 174/79, saturations 96%. HEENT:  Unremarkable. NECK:  Supple, no adenopathy. CHEST:  Chest wall is nontender. He has sternotomy. LUNGS:  Clear to auscultation and percussion. HEART:  Regular rate and rhythm. Normal S1, normal S2. Soft S4, no S3. No friction rub. No neck vein distention. No hepatojugular reflux. No thrills, lifts, or heaves. ABDOMEN:  Soft, nontender. No bruits. No ascites. No palpable masses. NEUROLOGIC:  Nonfocal.  The patient is awake, alert and appropriate. Normal speech. Normal gait. No focal motor signs.     LABORATORY DATA:  Review of his labs indicated normal cardiac enzymes. EKG demonstrates sinus rhythm, LVH and T-wave inversion which has been present for many many years and is unchanged from our most recent tracing from our office in 09/2018. An echocardiogram was done today which demonstrates preserved LV function and normal regional wall motion with an ejection fraction of 65%. IMPRESSION:  Atypical chest pain more likely gastrointestinal than cardiac, certainly no evidence for an acute coronary syndrome. The patient has always had normal LV function and wall motion, including on today's study. RECOMMENDATIONS:  Continue medical treatment and observation. From a cardiac standpoint, he can be discharged. In meantime, I will plan to see him and update his stress test since we have no ischemic testing since 2015. I discussed all of this with patient and he agrees to this plan.     Thank you for this referral.      Charanjit Hutton MD [email or web address]  D:  05/24/2019 20:24  T:  05/25/2019 2:27  JOB #:  [id number]

## 2019-05-25 NOTE — PROGRESS NOTES
Problem: Pancreatitis  Goal: *Control of acute pain  Outcome: Progressing Towards Goal  Goal: *Absence of nausea/vomiting  Outcome: Progressing Towards Goal  Goal: *Optimize nutritional status  Outcome: Progressing Towards Goal  Goal: *Labs within defined limits  Outcome: Progressing Towards Goal     Problem: Patient Education: Go to Patient Education Activity  Goal: Patient/Family Education  Outcome: Progressing Towards Goal     Problem: Falls - Risk of  Goal: *Absence of Falls  Description  Document Jarad Diaz Fall Risk and appropriate interventions in the flowsheet.   Outcome: Progressing Towards Goal     Problem: Patient Education: Go to Patient Education Activity  Goal: Patient/Family Education  Outcome: Progressing Towards Goal

## 2019-05-25 NOTE — DISCHARGE INSTRUCTIONS
Patient to follow a low fat, low cholesterol diet. Patient to do daily activities as tolerated. Patient to continue taking all regularly prescribed medications. Patient to follow up with Cardiologist Dr Tamiko Goldsmith in 1-2 weeks ( Patient to call office for appointment on next business day). Patient to follow up with Gastroenterologist Dr Kitty Ruiz in 2-3 weeks (Patient to call office for appointment on next business day.

## 2019-07-03 ENCOUNTER — OFFICE VISIT (OUTPATIENT)
Dept: URGENT CARE | Age: 84
End: 2019-07-03

## 2019-07-03 ENCOUNTER — APPOINTMENT (OUTPATIENT)
Dept: CT IMAGING | Age: 84
End: 2019-07-03
Attending: EMERGENCY MEDICINE
Payer: MEDICARE

## 2019-07-03 ENCOUNTER — HOSPITAL ENCOUNTER (EMERGENCY)
Age: 84
Discharge: HOME | End: 2019-07-03
Attending: EMERGENCY MEDICINE
Payer: MEDICARE

## 2019-07-03 VITALS
BODY MASS INDEX: 40 KG/M2 | HEART RATE: 76 BPM | WEIGHT: 254.85 LBS | DIASTOLIC BLOOD PRESSURE: 94 MMHG | RESPIRATION RATE: 16 BRPM | SYSTOLIC BLOOD PRESSURE: 164 MMHG | HEIGHT: 67 IN | OXYGEN SATURATION: 100 % | TEMPERATURE: 97.4 F

## 2019-07-03 DIAGNOSIS — K59.00 CONSTIPATION, UNSPECIFIED CONSTIPATION TYPE: Primary | ICD-10-CM

## 2019-07-03 LAB
ALBUMIN SERPL-MCNC: 4.4 G/DL (ref 3.5–5)
ALBUMIN/GLOB SERPL: 1.2 {RATIO} (ref 1.1–2.2)
ALP SERPL-CCNC: 78 U/L (ref 45–117)
ALT SERPL-CCNC: 17 U/L (ref 12–78)
ANION GAP SERPL CALC-SCNC: 5 MMOL/L (ref 5–15)
APPEARANCE UR: CLEAR
AST SERPL-CCNC: 20 U/L (ref 15–37)
BACTERIA URNS QL MICRO: NEGATIVE /HPF
BASOPHILS # BLD: 0 K/UL (ref 0–0.1)
BASOPHILS NFR BLD: 0 % (ref 0–1)
BILIRUB SERPL-MCNC: 0.5 MG/DL (ref 0.2–1)
BILIRUB UR QL: NEGATIVE
BUN SERPL-MCNC: 19 MG/DL (ref 6–20)
BUN/CREAT SERPL: 14 (ref 12–20)
CALCIUM SERPL-MCNC: 9.4 MG/DL (ref 8.5–10.1)
CHLORIDE SERPL-SCNC: 109 MMOL/L (ref 97–108)
CO2 SERPL-SCNC: 26 MMOL/L (ref 21–32)
COLOR UR: ABNORMAL
CREAT SERPL-MCNC: 1.35 MG/DL (ref 0.7–1.3)
DIFFERENTIAL METHOD BLD: ABNORMAL
EOSINOPHIL # BLD: 0.1 K/UL (ref 0–0.4)
EOSINOPHIL NFR BLD: 2 % (ref 0–7)
EPITH CASTS URNS QL MICRO: ABNORMAL /LPF
ERYTHROCYTE [DISTWIDTH] IN BLOOD BY AUTOMATED COUNT: 15.2 % (ref 11.5–14.5)
GLOBULIN SER CALC-MCNC: 3.8 G/DL (ref 2–4)
GLUCOSE SERPL-MCNC: 108 MG/DL (ref 65–100)
GLUCOSE UR STRIP.AUTO-MCNC: NEGATIVE MG/DL
HCT VFR BLD AUTO: 43.1 % (ref 36.6–50.3)
HGB BLD-MCNC: 14.1 G/DL (ref 12.1–17)
HGB UR QL STRIP: ABNORMAL
HYALINE CASTS URNS QL MICRO: ABNORMAL /LPF (ref 0–5)
IMM GRANULOCYTES # BLD AUTO: 0 K/UL (ref 0–0.04)
IMM GRANULOCYTES NFR BLD AUTO: 0 % (ref 0–0.5)
KETONES UR QL STRIP.AUTO: NEGATIVE MG/DL
LACTATE SERPL-SCNC: 0.8 MMOL/L (ref 0.4–2)
LEUKOCYTE ESTERASE UR QL STRIP.AUTO: NEGATIVE
LYMPHOCYTES # BLD: 1.7 K/UL (ref 0.8–3.5)
LYMPHOCYTES NFR BLD: 35 % (ref 12–49)
MCH RBC QN AUTO: 27.2 PG (ref 26–34)
MCHC RBC AUTO-ENTMCNC: 32.7 G/DL (ref 30–36.5)
MCV RBC AUTO: 83 FL (ref 80–99)
MONOCYTES # BLD: 0.4 K/UL (ref 0–1)
MONOCYTES NFR BLD: 9 % (ref 5–13)
NEUTS SEG # BLD: 2.5 K/UL (ref 1.8–8)
NEUTS SEG NFR BLD: 54 % (ref 32–75)
NITRITE UR QL STRIP.AUTO: NEGATIVE
NRBC # BLD: 0 K/UL (ref 0–0.01)
NRBC BLD-RTO: 0 PER 100 WBC
PH UR STRIP: 6 [PH] (ref 5–8)
PLATELET # BLD AUTO: 190 K/UL (ref 150–400)
PMV BLD AUTO: 11.2 FL (ref 8.9–12.9)
POTASSIUM SERPL-SCNC: 3.9 MMOL/L (ref 3.5–5.1)
PROT SERPL-MCNC: 8.2 G/DL (ref 6.4–8.2)
PROT UR STRIP-MCNC: 300 MG/DL
RBC # BLD AUTO: 5.19 M/UL (ref 4.1–5.7)
RBC #/AREA URNS HPF: ABNORMAL /HPF (ref 0–5)
SODIUM SERPL-SCNC: 140 MMOL/L (ref 136–145)
SP GR UR REFRACTOMETRY: 1.02 (ref 1–1.03)
UA: UC IF INDICATED,UAUC: ABNORMAL
UROBILINOGEN UR QL STRIP.AUTO: 0.2 EU/DL (ref 0.2–1)
WBC # BLD AUTO: 4.7 K/UL (ref 4.1–11.1)
WBC URNS QL MICRO: ABNORMAL /HPF (ref 0–4)

## 2019-07-03 PROCEDURE — 80053 COMPREHEN METABOLIC PANEL: CPT

## 2019-07-03 PROCEDURE — 81001 URINALYSIS AUTO W/SCOPE: CPT

## 2019-07-03 PROCEDURE — 83605 ASSAY OF LACTIC ACID: CPT

## 2019-07-03 PROCEDURE — 74176 CT ABD & PELVIS W/O CONTRAST: CPT

## 2019-07-03 PROCEDURE — 99283 EMERGENCY DEPT VISIT LOW MDM: CPT

## 2019-07-03 PROCEDURE — 85025 COMPLETE CBC W/AUTO DIFF WBC: CPT

## 2019-07-03 PROCEDURE — 36415 COLL VENOUS BLD VENIPUNCTURE: CPT

## 2019-07-03 NOTE — ED PROVIDER NOTES
EMERGENCY DEPARTMENT HISTORY AND PHYSICAL EXAM      Date: 7/3/2019  Patient Name: Arturo Larson  Patient Age and Sex: 80 y.o. male    History of Presenting Illness     Chief Complaint   Patient presents with    Abdominal Pain     mid abd pain since Monday hx of pancreatitis       History Provided By: Patient    HPI: Arturo Larson, 80 y.o. male with a complex medical and surgical history as noted below and which includes a recent hospitalization for acute pancreatitis, presents today with periumbilical abdominal pain that is radiating bilaterally to his back and that started on Monday. He is still able to eat and has a normal appetite. The pain he is experiencing is constant and not worse with food. Last BM was at 3am, pebble-like. Over the past week, BMs have been harder than usual and he has been constipated. The pancreatitis pain has resolved and was very different than what he expeiriences today. Past Medical History:   Diagnosis Date    CAD (coronary artery disease) 08/21/2017    PCI and stent    Cancer ([facility] [address]     Prostate CA    Diabetes ([facility] [address]     Hypertension     Myocardial infarct ([facility] [address] 2000    Sleep apnea     wears C-PAP    Sleep disorder     Sleep Apnea C-PAP use at home      Past Surgical History:   Procedure Laterality Date    CARDIAC SURG PROCEDURE UNLIST  2000    CABG    HX COLECTOMY  2012    HX CORONARY STENT PLACEMENT      HX PROSTATECTOMY         Pt denies any other alleviating or exacerbating factors. Additionally, pt specifically denies any recent fever, chills, headache, nausea, vomiting, abdominal pain, CP, SOB, lightheadedness, dizziness, numbness, weakness, BLE swelling, heart palpitations, urinary sxs, diarrhea, constipation, melena, hematochezia, cough, or congestion. PCP: Darryle Carte, Diallo Adorno MD    There are no other complaints, changes or physical findings at this time.      Past History   Past Medical History:  Past Medical History:   Diagnosis Date    CAD (coronary artery disease) 08/21/2017    PCI and stent    Cancer ([facility] [address]     Prostate CA    Diabetes ([facility] [address]     Hypertension     Myocardial infarct ([facility] [address] 2000    Sleep apnea     wears C-PAP    Sleep disorder     Sleep Apnea C-PAP use at home        Past Surgical History:  Past Surgical History:   Procedure Laterality Date    CARDIAC SURG PROCEDURE UNLIST  2000    CABG    HX COLECTOMY  2012    HX CORONARY STENT PLACEMENT      HX PROSTATECTOMY         Family History:  Family History   Problem Relation Age of Onset    Heart Disease Sister        Social History:  Social History     Tobacco Use    Smoking status: Never Smoker    Smokeless tobacco: Never Used   Substance Use Topics    Alcohol use: No    Drug use: Yes       Allergies: Allergies   Allergen Reactions    Iodine Other (comments)     \"it gives me a headache\"       Current Medications:  No current facility-administered medications on file prior to encounter. Current Outpatient Medications on File Prior to Encounter   Medication Sig Dispense Refill    ascorbic acid, vitamin C, (VITAMIN C) 500 mg tablet Take 500 mg by mouth daily.  methocarbamol (ROBAXIN) 750 mg tablet Take 750 mg by mouth nightly.  clopidogrel (PLAVIX) 75 mg tab Take 1 Tab by mouth daily. Indications: Thrombosis Prevention after PCI 90 Tab 3    metoprolol tartrate (LOPRESSOR) 25 mg tablet Take 0.5 Tabs by mouth daily. 30 Tab 3    docusate sodium (COLACE) 100 mg capsule Take 100 mg by mouth nightly as needed for Constipation.  VITAMIN E/QUININE SULFATE (LEG CRAMP TREATMENT PO) Take 1 Cap by mouth nightly. Filiberto's leg cramps      aspirin delayed-release 81 mg tablet Take 1 Tab by mouth daily. 100 Tab 4    pravastatin (PRAVACHOL) 20 mg tablet Take 20 mg by mouth nightly.  amLODIPine (NORVASC) 10 mg tablet Take 10 mg by mouth daily.  lisinopril (PRINIVIL, ZESTRIL) 40 mg tablet Take 40 mg by mouth daily.       bicalutamide (CASODEX) 50 mg tablet Take 50 mg by mouth daily. Review of Systems   Review of Systems   Constitutional: Negative for appetite change, chills and fever. Respiratory: Negative for cough, chest tightness and shortness of breath. Cardiovascular: Negative for chest pain, palpitations and leg swelling. Gastrointestinal: Positive for abdominal pain and constipation. Negative for abdominal distention, blood in stool, diarrhea, nausea and vomiting. Genitourinary: Negative for decreased urine volume, difficulty urinating, dysuria, flank pain, frequency and hematuria. Musculoskeletal: Negative for arthralgias, joint swelling, myalgias, neck pain and neck stiffness. Neurological: Negative for dizziness, weakness, light-headedness, numbness and headaches. Hematological: Negative for adenopathy. All other systems reviewed and are negative. Physical Exam   Physical Exam   Constitutional: He is oriented to person, place, and time. He appears well-developed and well-nourished. No distress. HENT:   Head: Atraumatic. Mouth/Throat: Oropharynx is clear and moist.   Eyes: Pupils are equal, round, and reactive to light. Conjunctivae and EOM are normal. No scleral icterus. Neck: Normal range of motion. Neck supple. No JVD present. Cardiovascular: Normal rate, regular rhythm, normal heart sounds and intact distal pulses. Pulmonary/Chest: Effort normal and breath sounds normal. He exhibits no tenderness. Abdominal: Soft. Bowel sounds are normal. He exhibits no distension. There is no tenderness. Musculoskeletal: Normal range of motion. He exhibits no edema. Neurological: He is alert and oriented to person, place, and time. No cranial nerve deficit. Skin: Skin is warm and dry. He is not diaphoretic. Nursing note and vitals reviewed.       Diagnostic Study Results     Labs -  Recent Results (from the past 24 hour(s))   CBC WITH AUTOMATED DIFF    Collection Time: 07/03/19  9:38 AM   Result Value Ref Range    WBC 4.7 4.1 - 11.1 K/uL    RBC 5.19 4. 10 - 5.70 M/uL    HGB 14.1 12.1 - 17.0 g/dL    HCT 43.1 36.6 - 50.3 %    MCV 83.0 80.0 - 99.0 FL    MCH 27.2 26.0 - 34.0 PG    MCHC 32.7 30.0 - 36.5 g/dL    RDW 15.2 (H) 11.5 - 14.5 %    PLATELET 943 044 - 568 K/uL    MPV 11.2 8.9 - 12.9 FL    NRBC 0.0 0  WBC    ABSOLUTE NRBC 0.00 0.00 - 0.01 K/uL    NEUTROPHILS 54 32 - 75 %    LYMPHOCYTES 35 12 - 49 %    MONOCYTES 9 5 - 13 %    EOSINOPHILS 2 0 - 7 %    BASOPHILS 0 0 - 1 %    IMMATURE GRANULOCYTES 0 0.0 - 0.5 %    ABS. NEUTROPHILS 2.5 1.8 - 8.0 K/UL    ABS. LYMPHOCYTES 1.7 0.8 - 3.5 K/UL    ABS. MONOCYTES 0.4 0.0 - 1.0 K/UL    ABS. EOSINOPHILS 0.1 0.0 - 0.4 K/UL    ABS. BASOPHILS 0.0 0.0 - 0.1 K/UL    ABS. IMM. GRANS. 0.0 0.00 - 0.04 K/UL    DF AUTOMATED     METABOLIC PANEL, COMPREHENSIVE    Collection Time: 07/03/19  9:38 AM   Result Value Ref Range    Sodium 140 136 - 145 mmol/L    Potassium 3.9 3.5 - 5.1 mmol/L    Chloride 109 (H) 97 - 108 mmol/L    CO2 26 21 - 32 mmol/L    Anion gap 5 5 - 15 mmol/L    Glucose 108 (H) 65 - 100 mg/dL    BUN 19 6 - 20 MG/DL    Creatinine 1.35 (H) 0.70 - 1.30 MG/DL    BUN/Creatinine ratio 14 12 - 20      GFR est AA >60 >60 ml/min/1.73m2    GFR est non-AA 50 (L) >60 ml/min/1.73m2    Calcium 9.4 8.5 - 10.1 MG/DL    Bilirubin, total 0.5 0.2 - 1.0 MG/DL    ALT (SGPT) 17 12 - 78 U/L    AST (SGOT) 20 15 - 37 U/L    Alk.  phosphatase 78 45 - 117 U/L    Protein, total 8.2 6.4 - 8.2 g/dL    Albumin 4.4 3.5 - 5.0 g/dL    Globulin 3.8 2.0 - 4.0 g/dL    A-G Ratio 1.2 1.1 - 2.2     URINALYSIS W/ REFLEX CULTURE    Collection Time: 07/03/19  9:38 AM   Result Value Ref Range    Color YELLOW/STRAW      Appearance CLEAR CLEAR      Specific gravity 1.019 1.003 - 1.030      pH (UA) 6.0 5.0 - 8.0      Protein 300 (A) NEG mg/dL    Glucose NEGATIVE  NEG mg/dL    Ketone NEGATIVE  NEG mg/dL    Bilirubin NEGATIVE  NEG      Blood SMALL (A) NEG      Urobilinogen 0.2 0.2 - 1.0 EU/dL    Nitrites NEGATIVE  NEG      Leukocyte Esterase NEGATIVE  NEG      WBC 0-4 0 - 4 /hpf    RBC 5-10 0 - 5 /hpf    Epithelial cells FEW FEW /lpf    Bacteria NEGATIVE  NEG /hpf    UA:UC IF INDICATED CULTURE NOT INDICATED BY UA RESULT CNI      Hyaline cast 0-2 0 - 5 /lpf       Radiologic Studies -   CT ABD PELV WO CONT    (Results Pending)     CT Results  (Last 48 hours)               07/03/19 1057  CT ABD PELV WO CONT Final result    Impression:  IMPRESSION:    1. No acute abdominal or pelvic abnormality. 2. Resolution of peripancreatic stranding. 3. Hepatic and bilateral renal cysts unchanged. 4. Atherosclerotic abdominal aorta without aneurysm. 5. Status post right colectomy. 6. Diverticulosis. 7. Status post prostatectomy. Narrative:  EXAM: CT ABDOMEN AND PELVIS WITHOUT CONTRAST   INDICATION: Periumbilical abdominal pain, radiates left and right to the back. COMPARISON: 5/24/2019. CONTRAST: None. TECHNIQUE:    Unenhanced multislice helical CT was performed from the diaphragm to the   symphysis pubis without intravenous contrast administration. Oral contrast was   not administered. Contiguous 5 mm axial images were reconstructed and lung and   soft tissue windows were generated. Coronal and sagittal reformations were   generated. CT dose reduction was achieved through use of a standardized protocol   tailored for this examination and automatic exposure control for dose   modulation. FINDINGS:   LOWER CHEST: The visualized portions of the lung bases are clear. The absence of intravenous contrast material reduces the sensitivity for   evaluation of the solid parenchymal organs of the abdomen. ABDOMEN:   Liver: The liver is normal in size and contour. There is a 3.6 x 3.3 x 3.4 cm   cyst in the posterior right lobe and a second smaller cyst in the medial segment   of the left lobe. These are unchanged. Gallbladder and bile ducts: There are no calcified stones and there is no   biliary duct dilatation.     Spleen: No abnormality. Pancreas: No abnormality. The peripancreatic stranding noted on the prior   examination has resolved. Adrenal glands: No abnormality. Kidneys: There are bilateral low-attenuation renal lesions compatible with   cysts. These are unchanged. There is no renal or ureteral calculus or   obstruction. PELVIS:   Reproductive organs: The prostate gland and seminal vesicles are absent. Bladder: No abnormality. RETROPERITONEUM: The aorta is atherosclerotic and tapers without aneurysm. There   is no retroperitoneal adenopathy or mass. There is no pelvic mass or adenopathy. BOWEL AND MESENTERY: The small bowel is not obstructed. There is a ileocolic   resection with stapled anastomosis. There are diverticula of the left colon. The   appendix is absent. PERITONEUM: There is no ascites or free intraperitoneal air. BONES AND SOFT TISSUES: There are degenerative changes of the spine. The patient   is obese. CXR Results  (Last 48 hours)    None          Medical Decision Making   I am the first provider for this patient. I reviewed the vital signs, available nursing notes, past medical history, past surgical history, family history and social history. Vital Signs-Reviewed the patient's vital signs. Patient Vitals for the past 24 hrs:   Temp Pulse Resp BP SpO2   07/03/19 0915 97.4 °F (36.3 °C) 76 16 (!) 164/94 100 %         Records Reviewed: Nursing Notes, Old Medical Records, Previous electrocardiograms, Previous Radiology Studies and Previous Laboratory Studies    Provider Notes (Medical Decision Making): Well appearing 81yo male presents with abdominal cramping in setting of being constipated for a few days. His exam is benign, he is afebrile and labs are normal including lipase. Given his complex history, I have obtained a ct abd/pelv and this shows no acute abnormalities. He is asking to go home and feels well enough to do so. I suspect his cramping is due to constipation. wE have discussed OTC medications he should start taking (stool softener, fiber supplement initially, laxative 1-2 days if needed) in order to move his bowels. He will see his doctor later this week. ED Course:   Initial assessment performed. The patients presenting problems have been discussed, and they are in agreement with the care plan formulated and outlined with them. I have encouraged them to ask questions as they arise throughout their visit. HYPERTENSION COUNSELING   Education was provided to the patient today regarding their hypertension. Patient is made aware of their elevated blood pressure and is instructed to follow up this week with their Primary Care for a recheck. Patient is counseled regarding consequences of chronic, uncontrolled hypertension including kidney disease, heart disease, stroke or even death. Patient states their understanding and agrees to follow up this week. Additionally, during their visit, I discussed sodium restriction, maintaining ideal body weight and regular exercise program as physiologic means to achieve blood pressure control. The patient will strive towards this. I reviewed our electronic medical record system for any past medical records that were available that may contribute to the patient's current condition, the nursing notes and vital signs from today's visit. Bryon Card MD    Medications Administered During ED Course:  Medications - No data to display           Progress Note:  Patient has been reassessed and reports feeling better and symptoms have improved after ED treatment. Iron Lennon is able to tolerate PO and ambulate per baseline. Karina Lozano's final labs and imaging have been reviewed with him. He has been counseled regarding his diagnosis.  He verbally conveys understanding and agreement of the signs, symptoms, diagnosis, treatment and prognosis and additionally agrees to follow up as recommended with . Bernadine Deutsch, Vicente Merritt MD in 24 - 48 hours. He also agrees with the care-plan and conveys that all of his questions have been answered. I have also put together some discharge instructions for him that include: 1) educational information regarding their diagnosis, 2) how to care for their diagnosis at home, as well a 3) list of reasons why they would want to return to the ED prior to their follow-up appointment, should their condition change. I have answered all questions to the patient's satisfaction. Strict return precautions given. He both understood and agreed with plan as discussed above. Vital signs stable for discharge. Critical Care Time: none    Disposition: DISCHARGE     The pt is ready for discharge, feels considerably better, has normal vital signs and feels comfortable going home. I think this is reasonable as no findings today suggest a life-threatening condition. The pt's signs, symptoms, diagnosis, and discharge instructions have been discussed in detail and pt has conveyed their understanding. Written discharge instructions provided. The pt is to follow up as recommended or return to ER should their symptoms worsen. Plan has been discussed, all questions answered and pt is in agreement. Suznane Santiago MD      Diagnosis     Clinical Impression:   1. Constipation, unspecified constipation type        Attestation:  I personally performed the services described in this documentation on this date 7/3/2019 for patient Yo Orozco. Suzanne Santiago MD    Please note that this dictation was completed with [employer], the computer voice recognition software. Quite often unanticipated grammatical, syntax, homophones, and other interpretive errors are inadvertently transcribed by the computer software. Please disregard these errors. Please excuse any errors that have escaped final proofreading.

## 2019-07-03 NOTE — DISCHARGE INSTRUCTIONS
Patient Education        Constipation: Care Instructions  Your Care Instructions    Constipation means that you have a hard time passing stools (bowel movements). People pass stools from 3 times a day to once every 3 days. What is normal for you may be different. Constipation may occur with pain in the rectum and cramping. The pain may get worse when you try to pass stools. Sometimes there are small amounts of bright red blood on toilet paper or the surface of stools. This is because of enlarged veins near the rectum (hemorrhoids). A few changes in your diet and lifestyle may help you avoid ongoing constipation. Your doctor may also prescribe medicine to help loosen your stool. Some medicines can cause constipation. These include pain medicines and antidepressants. Tell your doctor about all the medicines you take. Your doctor may want to make a medicine change to ease your symptoms. Follow-up care is a key part of your treatment and safety. Be sure to make and go to all appointments, and call your doctor if you are having problems. It's also a good idea to know your test results and keep a list of the medicines you take. How can you care for yourself at home? · Drink plenty of fluids, enough so that your urine is light yellow or clear like water. If you have kidney, heart, or liver disease and have to limit fluids, talk with your doctor before you increase the amount of fluids you drink. · Include high-fiber foods in your diet each day. These include fruits, vegetables, beans, and whole grains. · Get at least 30 minutes of exercise on most days of the week. Walking is a good choice. You also may want to do other activities, such as running, swimming, cycling, or playing tennis or team sports. · Take a fiber supplement, such as Citrucel or Metamucil, every day. Read and follow all instructions on the label. · Schedule time each day for a bowel movement. A daily routine may help.  Take your time having your bowel movement. · Support your feet with a small step stool when you sit on the toilet. This helps flex your hips and places your pelvis in a squatting position. · Your doctor may recommend an over-the-counter laxative to relieve your constipation. Examples are Milk of Magnesia and MiraLax. Read and follow all instructions on the label. Do not use laxatives on a long-term basis. When should you call for help? Call your doctor now or seek immediate medical care if:    · You have new or worse belly pain.     · You have new or worse nausea or vomiting.     · You have blood in your stools.    Watch closely for changes in your health, and be sure to contact your doctor if:    · Your constipation is getting worse.     · You do not get better as expected. Where can you learn more? Go to http://salvatore-thu.info/. Enter 21 [phone] in the search box to learn more about \"Constipation: Care Instructions. \"  Current as of: September 23, 2018  Content Version: 11.9  © 2343-6095 [employer]. Care instructions adapted under license by [employer] (which disclaims liability or warranty for this information). If you have questions about a medical condition or this instruction, always ask your healthcare professional. [place] [address] any warranty or liability for your use of this information.

## 2019-07-04 ENCOUNTER — HOSPITAL ENCOUNTER (EMERGENCY)
Age: 84
Discharge: HOME | End: 2019-07-04
Attending: EMERGENCY MEDICINE
Payer: MEDICARE

## 2019-07-04 VITALS
RESPIRATION RATE: 16 BRPM | OXYGEN SATURATION: 96 % | TEMPERATURE: 97.6 F | BODY MASS INDEX: 40.78 KG/M2 | WEIGHT: 253.75 LBS | SYSTOLIC BLOOD PRESSURE: 168 MMHG | DIASTOLIC BLOOD PRESSURE: 79 MMHG | HEART RATE: 56 BPM | HEIGHT: 66 IN

## 2019-07-04 DIAGNOSIS — K59.00 CONSTIPATION, UNSPECIFIED CONSTIPATION TYPE: Primary | ICD-10-CM

## 2019-07-04 PROCEDURE — 74011250637 HC RX REV CODE- 250/637: Performed by: EMERGENCY MEDICINE

## 2019-07-04 PROCEDURE — 99284 EMERGENCY DEPT VISIT MOD MDM: CPT

## 2019-07-04 RX ORDER — LACTULOSE 10 G/15ML
20 SOLUTION ORAL; RECTAL 2 TIMES DAILY
Qty: 180 ML | Refills: 0 | Status: SHIPPED | OUTPATIENT
Start: 2019-07-04 | End: 2019-07-07

## 2019-07-04 RX ORDER — MAGNESIUM CITRATE
296 SOLUTION, ORAL ORAL
Status: COMPLETED | OUTPATIENT
Start: 2019-07-04 | End: 2019-07-04

## 2019-07-04 RX ADMIN — LACTULOSE 45 ML: 20 SOLUTION ORAL at 15:22

## 2019-07-04 RX ADMIN — MAGNESIUM CITRATE 296 ML: 1.75 LIQUID ORAL at 12:55

## 2019-07-04 NOTE — DISCHARGE INSTRUCTIONS
Patient Education   You were seen for decreased bowel movements and abdominal pain, most likely due to constipation. For the constipation, drink plenty of fluids, use Colace stool softener with Dulcolax or Miralax twice a day laxative until bowel movement occurs. If still no bowel movement, can buy Magnesium Citrate over the counter for a whole bowel cleanse. Maintain a high fiber diet by eating green vegetables and fruit and drink 6-8 large glasses of water daily to avoid constipation in the future. Metamucil, 1 tablespoon once or twice daily can be used to keep bowels regular if needed. Timing elimination to occur after meals, or after a hot drink, can also improve the situation long term. Call if symptoms persist or worsen. Constipation: Care Instructions  Your Care Instructions    Constipation means that you have a hard time passing stools (bowel movements). People pass stools from 3 times a day to once every 3 days. What is normal for you may be different. Constipation may occur with pain in the rectum and cramping. The pain may get worse when you try to pass stools. Sometimes there are small amounts of bright red blood on toilet paper or the surface of stools. This is because of enlarged veins near the rectum (hemorrhoids). A few changes in your diet and lifestyle may help you avoid ongoing constipation. Your doctor may also prescribe medicine to help loosen your stool. Some medicines can cause constipation. These include pain medicines and antidepressants. Tell your doctor about all the medicines you take. Your doctor may want to make a medicine change to ease your symptoms. Follow-up care is a key part of your treatment and safety. Be sure to make and go to all appointments, and call your doctor if you are having problems. It's also a good idea to know your test results and keep a list of the medicines you take. How can you care for yourself at home?   · Drink plenty of fluids, enough so that your urine is light yellow or clear like water. If you have kidney, heart, or liver disease and have to limit fluids, talk with your doctor before you increase the amount of fluids you drink. · Include high-fiber foods in your diet each day. These include fruits, vegetables, beans, and whole grains. · Get at least 30 minutes of exercise on most days of the week. Walking is a good choice. You also may want to do other activities, such as running, swimming, cycling, or playing tennis or team sports. · Take a fiber supplement, such as Citrucel or Metamucil, every day. Read and follow all instructions on the label. · Schedule time each day for a bowel movement. A daily routine may help. Take your time having your bowel movement. · Support your feet with a small step stool when you sit on the toilet. This helps flex your hips and places your pelvis in a squatting position. · Your doctor may recommend an over-the-counter laxative to relieve your constipation. Examples are Milk of Magnesia and MiraLax. Read and follow all instructions on the label. Do not use laxatives on a long-term basis. When should you call for help? Call your doctor now or seek immediate medical care if:    · You have new or worse belly pain.     · You have new or worse nausea or vomiting.     · You have blood in your stools.    Watch closely for changes in your health, and be sure to contact your doctor if:    · Your constipation is getting worse.     · You do not get better as expected. Where can you learn more? Go to http://salvatore-thu.info/. Enter 21 [phone] in the search box to learn more about \"Constipation: Care Instructions. \"  Current as of: September 23, 2018  Content Version: 11.9  © 3528-2099 [employer]. Care instructions adapted under license by [employer] (which disclaims liability or warranty for this information).  If you have questions about a medical condition or this instruction, always ask your healthcare professional. [place] [address] any warranty or liability for your use of this information.

## 2019-07-04 NOTE — ED NOTES
Patient discharge instructions reviewed with patient by MD Juany Rivera. Patient verbalized understanding, patient transported off unit via wheelchair.

## 2019-07-04 NOTE — ED PROVIDER NOTES
EMERGENCY DEPARTMENT HISTORY AND PHYSICAL EXAM      Date: 7/4/2019  Patient Name: Skinny Beltran  Patient Age and Sex: 80 y.o. male     History of Presenting Illness     Chief Complaint   Patient presents with    Constipation     pt states he was seen yesterday for same, and has still not had BM       History Provided By: Patient    HPI: Skinny Beltran is an 80-year-old male presenting with constipation. Patient states that for the past 4 days has not had a bowel movement. Patient states that he has issues with constipation very rarely. He takes Colace every day already. He was seen here in the ER yesterday and had a CAT scan done which did not show any blockages. Was sent home and was told to take stool softeners and fiber supplements when got to the pharmacist, was told that he already is on this. Yesterday, patient took MiraLAX, Ex-Lax and milk of magnesia with no improvement. States he is having some mild lower abdominal pain that radiates to his back. There are no other complaints, changes, or physical findings at this time. PCP: Roe Reyes MD    No current facility-administered medications on file prior to encounter. Current Outpatient Medications on File Prior to Encounter   Medication Sig Dispense Refill    ascorbic acid, vitamin C, (VITAMIN C) 500 mg tablet Take 500 mg by mouth daily.  methocarbamol (ROBAXIN) 750 mg tablet Take 750 mg by mouth nightly.  clopidogrel (PLAVIX) 75 mg tab Take 1 Tab by mouth daily. Indications: Thrombosis Prevention after PCI 90 Tab 3    metoprolol tartrate (LOPRESSOR) 25 mg tablet Take 0.5 Tabs by mouth daily. 30 Tab 3    docusate sodium (COLACE) 100 mg capsule Take 100 mg by mouth nightly as needed for Constipation.  VITAMIN E/QUININE SULFATE (LEG CRAMP TREATMENT PO) Take 1 Cap by mouth nightly. Filiberto's leg cramps      aspirin delayed-release 81 mg tablet Take 1 Tab by mouth daily.  100 Tab 4    pravastatin (PRAVACHOL) 20 mg tablet Take 20 mg by mouth nightly.  amLODIPine (NORVASC) 10 mg tablet Take 10 mg by mouth daily.  lisinopril (PRINIVIL, ZESTRIL) 40 mg tablet Take 40 mg by mouth daily.  bicalutamide (CASODEX) 50 mg tablet Take 50 mg by mouth daily. Past History     Past Medical History:  Past Medical History:   Diagnosis Date    CAD (coronary artery disease) 08/21/2017    PCI and stent    Cancer ([facility]     Prostate CA    Diabetes [facility]     Hypertension     Myocardial infarct ([facility] 2000    Sleep apnea     wears C-PAP    Sleep disorder     Sleep Apnea C-PAP use at home        Past Surgical History:  Past Surgical History:   Procedure Laterality Date    CARDIAC SURG PROCEDURE UNLIST  2000    CABG    HX COLECTOMY  2012    HX CORONARY STENT PLACEMENT      HX PROSTATECTOMY         Family History:  Family History   Problem Relation Age of Onset    Heart Disease Sister        Social History:  Social History     Tobacco Use    Smoking status: Never Smoker    Smokeless tobacco: Never Used   Substance Use Topics    Alcohol use: No    Drug use: Yes       Allergies: Allergies   Allergen Reactions    Iodine Other (comments)     \"it gives me a headache\"         Review of Systems   Review of Systems   Constitutional: Negative for chills and fever. Respiratory: Negative for cough and shortness of breath. Cardiovascular: Negative for chest pain. Gastrointestinal: Positive for abdominal pain and constipation. Negative for diarrhea, nausea and vomiting. Musculoskeletal: Positive for back pain. Neurological: Negative for weakness and numbness. All other systems reviewed and are negative. Physical Exam   Physical Exam   Constitutional: He is oriented to person, place, and time. He appears well-developed and well-nourished. HENT:   Head: Normocephalic and atraumatic. Eyes: Conjunctivae and EOM are normal.   Neck: Normal range of motion. Neck supple.    Cardiovascular: Normal rate and regular rhythm. Pulmonary/Chest: Effort normal and breath sounds normal. No respiratory distress. Abdominal: Soft. He exhibits no distension. There is tenderness. Minimal tenderness in the left lower quadrant and right lower quadrant   Musculoskeletal: Normal range of motion. Neurological: He is alert and oriented to person, place, and time. Skin: Skin is warm and dry. Psychiatric: He has a normal mood and affect. Nursing note and vitals reviewed. Diagnostic Study Results     Labs -   No results found for this or any previous visit (from the past 12 hour(s)). Radiologic Studies -   No orders to display     CT Results  (Last 48 hours)               07/03/19 1057  CT ABD PELV WO CONT Final result    Impression:  IMPRESSION:    1. No acute abdominal or pelvic abnormality. 2. Resolution of peripancreatic stranding. 3. Hepatic and bilateral renal cysts unchanged. 4. Atherosclerotic abdominal aorta without aneurysm. 5. Status post right colectomy. 6. Diverticulosis. 7. Status post prostatectomy. Narrative:  EXAM: CT ABDOMEN AND PELVIS WITHOUT CONTRAST   INDICATION: Periumbilical abdominal pain, radiates left and right to the back. COMPARISON: 5/24/2019. CONTRAST: None. TECHNIQUE:    Unenhanced multislice helical CT was performed from the diaphragm to the   symphysis pubis without intravenous contrast administration. Oral contrast was   not administered. Contiguous 5 mm axial images were reconstructed and lung and   soft tissue windows were generated. Coronal and sagittal reformations were   generated. CT dose reduction was achieved through use of a standardized protocol   tailored for this examination and automatic exposure control for dose   modulation. FINDINGS:   LOWER CHEST: The visualized portions of the lung bases are clear.    The absence of intravenous contrast material reduces the sensitivity for   evaluation of the solid parenchymal organs of the abdomen. ABDOMEN:   Liver: The liver is normal in size and contour. There is a 3.6 x 3.3 x 3.4 cm   cyst in the posterior right lobe and a second smaller cyst in the medial segment   of the left lobe. These are unchanged. Gallbladder and bile ducts: There are no calcified stones and there is no   biliary duct dilatation. Spleen: No abnormality. Pancreas: No abnormality. The peripancreatic stranding noted on the prior   examination has resolved. Adrenal glands: No abnormality. Kidneys: There are bilateral low-attenuation renal lesions compatible with   cysts. These are unchanged. There is no renal or ureteral calculus or   obstruction. PELVIS:   Reproductive organs: The prostate gland and seminal vesicles are absent. Bladder: No abnormality. RETROPERITONEUM: The aorta is atherosclerotic and tapers without aneurysm. There   is no retroperitoneal adenopathy or mass. There is no pelvic mass or adenopathy. BOWEL AND MESENTERY: The small bowel is not obstructed. There is a ileocolic   resection with stapled anastomosis. There are diverticula of the left colon. The   appendix is absent. PERITONEUM: There is no ascites or free intraperitoneal air. BONES AND SOFT TISSUES: There are degenerative changes of the spine. The patient   is obese. CXR Results  (Last 48 hours)    None            Medical Decision Making   I am the first provider for this patient. I reviewed the vital signs, available nursing notes, past medical history, past surgical history, family history and social history. Vital Signs-Reviewed the patient's vital signs. No data found. Records Reviewed: Nursing Notes and Old Medical Records    Provider Notes (Medical Decision Making):   Patient presenting with persistent constipation despite taking over-the-counter medications. I reviewed the CT done yesterday and sure enough no signs of small bowel obstruction or signs of infection.   This is likely all constipation. Will give patient enema here as well as magnesium citrate to see if he is able to defecate here. He might need a manual disimpaction. ED Course:   Initial assessment performed. The patients presenting problems have been discussed, and they are in agreement with the care plan formulated and outlined with them. I have encouraged them to ask questions as they arise throughout their visit. ED Course as of Jul 05 0955   u Jul 04, 2019   1507 After enema, patient was able to make small volume of feces. Asking to go home with something for the constipation. We will give him a dose of lactulose here and a prescription. [JS]      ED Course User Index  [JS] Keesha Wan MD     Critical Care Time:   0    Disposition:  Discharge Note:  The patient has been re-evaluated and is ready for discharge. Reviewed available results with patient. Counseled patient on diagnosis and care plan. Patient has expressed understanding, and all questions have been answered. Patient agrees with plan and agrees to follow up as recommended, or to return to the ED if their symptoms worsen. Discharge instructions have been provided and explained to the patient, along with reasons to return to the ED. PLAN:  Discharge Medication List as of 7/4/2019  3:07 PM      START taking these medications    Details   lactulose (CHRONULAC) 10 gram/15 mL solution Take 30 mL by mouth two (2) times a day for 3 days. , Normal, Disp-180 mL, R-0         CONTINUE these medications which have NOT CHANGED    Details   ascorbic acid, vitamin C, (VITAMIN C) 500 mg tablet Take 500 mg by mouth daily. , Historical Med      methocarbamol (ROBAXIN) 750 mg tablet Take 750 mg by mouth nightly., Historical Med      clopidogrel (PLAVIX) 75 mg tab Take 1 Tab by mouth daily. Indications: Thrombosis Prevention after PCI, Print, Disp-90 Tab, R-3      metoprolol tartrate (LOPRESSOR) 25 mg tablet Take 0.5 Tabs by mouth daily. , No Print, Disp-30 Tab, R-3 docusate sodium (COLACE) 100 mg capsule Take 100 mg by mouth nightly as needed for Constipation. , Historical Med      VITAMIN E/QUININE SULFATE (LEG CRAMP TREATMENT PO) Take 1 Cap by mouth nightly. Filiberto's leg cramps, Historical Med      aspirin delayed-release 81 mg tablet Take 1 Tab by mouth daily. , Print, Disp-100 Tab, R-4      pravastatin (PRAVACHOL) 20 mg tablet Take 20 mg by mouth nightly., Historical Med      amLODIPine (NORVASC) 10 mg tablet Take 10 mg by mouth daily. , Historical Med      lisinopril (PRINIVIL, ZESTRIL) 40 mg tablet Take 40 mg by mouth daily. , Historical Med      bicalutamide (CASODEX) 50 mg tablet Take 50 mg by mouth daily. , Historical Med           2. Follow-up Information     Follow up With Specialties Details Why Contact Info    Elmer Ware MD [facility]  As needed [address] [facility]  [address]  [phone]          3. Return to ED if worse     Diagnosis     Clinical Impression:   1. Constipation, unspecified constipation type        Attestations:    Baljinder Hernandez M.D. Please note that this dictation was completed with Grokker, the computer voice recognition software. Quite often unanticipated grammatical, syntax, homophones, and other interpretive errors are inadvertently transcribed by the computer software. Please disregard these errors. Please excuse any errors that have escaped final proofreading. Thank you.

## 2019-07-08 ENCOUNTER — HOSPITAL ENCOUNTER (EMERGENCY)
Age: 84
Discharge: HOME | End: 2019-07-08
Attending: EMERGENCY MEDICINE
Payer: MEDICARE

## 2019-07-08 VITALS
DIASTOLIC BLOOD PRESSURE: 86 MMHG | HEART RATE: 83 BPM | RESPIRATION RATE: 20 BRPM | TEMPERATURE: 97.1 F | OXYGEN SATURATION: 92 % | BODY MASS INDEX: 44.65 KG/M2 | SYSTOLIC BLOOD PRESSURE: 195 MMHG | HEIGHT: 63 IN | WEIGHT: 251.99 LBS

## 2019-07-08 DIAGNOSIS — R10.9 ACUTE RIGHT FLANK PAIN: Primary | ICD-10-CM

## 2019-07-08 LAB
ALBUMIN SERPL-MCNC: 4.1 G/DL (ref 3.5–5)
ALBUMIN/GLOB SERPL: 1.1 {RATIO} (ref 1.1–2.2)
ALP SERPL-CCNC: 76 U/L (ref 45–117)
ALT SERPL-CCNC: 17 U/L (ref 12–78)
ANION GAP SERPL CALC-SCNC: 7 MMOL/L (ref 5–15)
APPEARANCE UR: ABNORMAL
AST SERPL-CCNC: 13 U/L (ref 15–37)
BACTERIA URNS QL MICRO: NEGATIVE /HPF
BASOPHILS # BLD: 0 K/UL (ref 0–0.1)
BASOPHILS NFR BLD: 0 % (ref 0–1)
BILIRUB SERPL-MCNC: 0.6 MG/DL (ref 0.2–1)
BILIRUB UR QL: NEGATIVE
BUN SERPL-MCNC: 28 MG/DL (ref 6–20)
BUN/CREAT SERPL: 17 (ref 12–20)
CALCIUM SERPL-MCNC: 9.5 MG/DL (ref 8.5–10.1)
CHLORIDE SERPL-SCNC: 109 MMOL/L (ref 97–108)
CO2 SERPL-SCNC: 25 MMOL/L (ref 21–32)
COLOR UR: ABNORMAL
CREAT SERPL-MCNC: 1.61 MG/DL (ref 0.7–1.3)
DIFFERENTIAL METHOD BLD: ABNORMAL
EOSINOPHIL # BLD: 0.1 K/UL (ref 0–0.4)
EOSINOPHIL NFR BLD: 3 % (ref 0–7)
EPITH CASTS URNS QL MICRO: ABNORMAL /LPF
ERYTHROCYTE [DISTWIDTH] IN BLOOD BY AUTOMATED COUNT: 15.2 % (ref 11.5–14.5)
GLOBULIN SER CALC-MCNC: 3.7 G/DL (ref 2–4)
GLUCOSE SERPL-MCNC: 102 MG/DL (ref 65–100)
GLUCOSE UR STRIP.AUTO-MCNC: NEGATIVE MG/DL
HCT VFR BLD AUTO: 43 % (ref 36.6–50.3)
HGB BLD-MCNC: 14.3 G/DL (ref 12.1–17)
HGB UR QL STRIP: NEGATIVE
HYALINE CASTS URNS QL MICRO: ABNORMAL /LPF (ref 0–5)
IMM GRANULOCYTES # BLD AUTO: 0 K/UL (ref 0–0.04)
IMM GRANULOCYTES NFR BLD AUTO: 1 % (ref 0–0.5)
KETONES UR QL STRIP.AUTO: NEGATIVE MG/DL
LEUKOCYTE ESTERASE UR QL STRIP.AUTO: NEGATIVE
LYMPHOCYTES # BLD: 1.5 K/UL (ref 0.8–3.5)
LYMPHOCYTES NFR BLD: 37 % (ref 12–49)
MCH RBC QN AUTO: 27.4 PG (ref 26–34)
MCHC RBC AUTO-ENTMCNC: 33.3 G/DL (ref 30–36.5)
MCV RBC AUTO: 82.5 FL (ref 80–99)
MONOCYTES # BLD: 0.4 K/UL (ref 0–1)
MONOCYTES NFR BLD: 11 % (ref 5–13)
NEUTS SEG # BLD: 2 K/UL (ref 1.8–8)
NEUTS SEG NFR BLD: 48 % (ref 32–75)
NITRITE UR QL STRIP.AUTO: NEGATIVE
NRBC # BLD: 0 K/UL (ref 0–0.01)
NRBC BLD-RTO: 0 PER 100 WBC
PH UR STRIP: 6 [PH] (ref 5–8)
PLATELET # BLD AUTO: 206 K/UL (ref 150–400)
PMV BLD AUTO: 10.9 FL (ref 8.9–12.9)
POTASSIUM SERPL-SCNC: 3.8 MMOL/L (ref 3.5–5.1)
PROT SERPL-MCNC: 7.8 G/DL (ref 6.4–8.2)
PROT UR STRIP-MCNC: NEGATIVE MG/DL
RBC # BLD AUTO: 5.21 M/UL (ref 4.1–5.7)
RBC #/AREA URNS HPF: ABNORMAL /HPF (ref 0–5)
SODIUM SERPL-SCNC: 141 MMOL/L (ref 136–145)
SP GR UR REFRACTOMETRY: 1.02 (ref 1–1.03)
UA: UC IF INDICATED,UAUC: ABNORMAL
UROBILINOGEN UR QL STRIP.AUTO: 0.2 EU/DL (ref 0.2–1)
WBC # BLD AUTO: 4.1 K/UL (ref 4.1–11.1)
WBC URNS QL MICRO: ABNORMAL /HPF (ref 0–4)

## 2019-07-08 PROCEDURE — 85025 COMPLETE CBC W/AUTO DIFF WBC: CPT

## 2019-07-08 PROCEDURE — 99283 EMERGENCY DEPT VISIT LOW MDM: CPT

## 2019-07-08 PROCEDURE — 81001 URINALYSIS AUTO W/SCOPE: CPT

## 2019-07-08 PROCEDURE — 96374 THER/PROPH/DIAG INJ IV PUSH: CPT

## 2019-07-08 PROCEDURE — 74011250636 HC RX REV CODE- 250/636: Performed by: EMERGENCY MEDICINE

## 2019-07-08 PROCEDURE — 80053 COMPREHEN METABOLIC PANEL: CPT

## 2019-07-08 PROCEDURE — 36415 COLL VENOUS BLD VENIPUNCTURE: CPT

## 2019-07-08 RX ORDER — SODIUM CHLORIDE 0.9 % (FLUSH) 0.9 %
5-40 SYRINGE (ML) INJECTION AS NEEDED
Status: DISCONTINUED | OUTPATIENT
Start: 2019-07-08 | End: 2019-07-08 | Stop reason: HOSPADM

## 2019-07-08 RX ORDER — SODIUM CHLORIDE 0.9 % (FLUSH) 0.9 %
5-40 SYRINGE (ML) INJECTION EVERY 8 HOURS
Status: DISCONTINUED | OUTPATIENT
Start: 2019-07-08 | End: 2019-07-08 | Stop reason: HOSPADM

## 2019-07-08 RX ORDER — TRAMADOL HYDROCHLORIDE 50 MG/1
50 TABLET ORAL
Qty: 12 TAB | Refills: 0 | Status: SHIPPED | OUTPATIENT
Start: 2019-07-08 | End: 2019-07-11

## 2019-07-08 RX ORDER — METHOCARBAMOL 500 MG/1
500 TABLET, FILM COATED ORAL 4 TIMES DAILY
Qty: 20 TAB | Refills: 0 | Status: SHIPPED | OUTPATIENT
Start: 2019-07-08 | End: 2022-06-30

## 2019-07-08 RX ORDER — FENTANYL CITRATE 50 UG/ML
25 INJECTION, SOLUTION INTRAMUSCULAR; INTRAVENOUS
Status: COMPLETED | OUTPATIENT
Start: 2019-07-08 | End: 2019-07-08

## 2019-07-08 RX ADMIN — FENTANYL CITRATE 25 MCG: 50 INJECTION, SOLUTION INTRAMUSCULAR; INTRAVENOUS at 06:32

## 2019-07-08 NOTE — ED NOTES
Bedside and Verbal shift change report given to Tristen Burnett RN  (oncoming nurse) by Agus Rios RN  (offgoing nurse). Report included the following information SBAR, Kardex, ED Summary, Intake/Output, MAR, Recent Results and Med Rec Status.

## 2019-07-08 NOTE — DISCHARGE INSTRUCTIONS
Patient Education        Flank Pain: Care Instructions  Your Care Instructions  Flank pain is pain on the side of the back just below the rib cage and above the waist. It can be on one or both sides. Flank pain has many possible causes, including a kidney stone, a urinary tract infection, or back strain. Flank pain may get better on its own. But don't ignore new symptoms, such as fever, nausea and vomiting, urination problems, pain that gets worse, and dizziness. These may be signs of a more serious problem. You may have to have tests or other treatment. Follow-up care is a key part of your treatment and safety. Be sure to make and go to all appointments, and call your doctor if you are having problems. It's also a good idea to know your test results and keep a list of the medicines you take. How can you care for yourself at home? · Rest until you feel better. · Take pain medicines exactly as directed. ? If the doctor gave you a prescription medicine for pain, take it as prescribed. ? If you are not taking a prescription pain medicine, ask your doctor if you can take an over-the-counter pain medicine, such as acetaminophen (Tylenol), ibuprofen (Advil, Motrin), or naproxen (Aleve). Read and follow all instructions on the label. · If your doctor prescribed antibiotics, take them as directed. Do not stop taking them just because you feel better. You need to take the full course of antibiotics. · To apply heat, put a warm water bottle, a heating pad set on low, or a warm cloth on the painful area. Do not go to sleep with a heating pad on your skin. · To prevent dehydration, drink plenty of fluids, enough so that your urine is light yellow or clear like water. Choose water and other caffeine-free clear liquids until you feel better. If you have kidney, heart, or liver disease and have to limit fluids, talk with your doctor before you increase the amount of fluids you drink. When should you call for help?   Call your doctor now or seek immediate medical care if:    · Your flank pain gets worse.     · You have new symptoms, such as fever, nausea, or vomiting.     · You have symptoms of a urinary problem. For example:  ? You have blood or pus in your urine. ? You have chills or body aches. ? It hurts to urinate. ? You have groin or belly pain.    Watch closely for changes in your health, and be sure to contact your doctor if you do not get better as expected. Where can you learn more? Go to http://salvatore-thu.info/. Enter S191 in the search box to learn more about \"Flank Pain: Care Instructions. \"  Current as of: September 23, 2018  Content Version: 11.9  © 9462-2176 [employer]. Care instructions adapted under license by [employer] (which disclaims liability or warranty for this information). If you have questions about a medical condition or this instruction, always ask your healthcare professional. [place] [address] any warranty or liability for your use of this information.

## 2019-07-08 NOTE — ED NOTES
Bedside and Verbal shift change report given to José Coleman (oncoming nurse) by Jaron Diaz (offgoing nurse).  Report included the following information SBAR, Kardex and ED Summary.

## 2019-07-08 NOTE — ED NOTES
Discharge instructions reviewed with patient, copy given by   Dr. Lionel aGrcia . Pt is accomponied by family, denies use of wheelchair.

## 2019-07-10 ENCOUNTER — APPOINTMENT (OUTPATIENT)
Dept: GENERAL RADIOLOGY | Age: 84
End: 2019-07-10
Attending: EMERGENCY MEDICINE
Payer: MEDICARE

## 2019-07-10 ENCOUNTER — HOSPITAL ENCOUNTER (EMERGENCY)
Age: 84
Discharge: HOME | End: 2019-07-10
Attending: EMERGENCY MEDICINE
Payer: MEDICARE

## 2019-07-10 VITALS
WEIGHT: 256.84 LBS | RESPIRATION RATE: 20 BRPM | BODY MASS INDEX: 45.51 KG/M2 | OXYGEN SATURATION: 92 % | TEMPERATURE: 97.7 F | HEART RATE: 75 BPM | HEIGHT: 63 IN | SYSTOLIC BLOOD PRESSURE: 155 MMHG | DIASTOLIC BLOOD PRESSURE: 65 MMHG

## 2019-07-10 DIAGNOSIS — R10.9 ABDOMINAL CRAMPING: ICD-10-CM

## 2019-07-10 DIAGNOSIS — R33.9 URINARY RETENTION: Primary | ICD-10-CM

## 2019-07-10 LAB
APPEARANCE UR: CLEAR
BACTERIA URNS QL MICRO: NEGATIVE /HPF
BILIRUB UR QL: NEGATIVE
COLOR UR: ABNORMAL
EPITH CASTS URNS QL MICRO: ABNORMAL /LPF
GLUCOSE UR STRIP.AUTO-MCNC: NEGATIVE MG/DL
HGB UR QL STRIP: ABNORMAL
HYALINE CASTS URNS QL MICRO: ABNORMAL /LPF (ref 0–5)
KETONES UR QL STRIP.AUTO: NEGATIVE MG/DL
LEUKOCYTE ESTERASE UR QL STRIP.AUTO: NEGATIVE
NITRITE UR QL STRIP.AUTO: NEGATIVE
PH UR STRIP: 5.5 [PH] (ref 5–8)
PROT UR STRIP-MCNC: 100 MG/DL
RBC #/AREA URNS HPF: ABNORMAL /HPF (ref 0–5)
SP GR UR REFRACTOMETRY: 1.02 (ref 1–1.03)
UA: UC IF INDICATED,UAUC: ABNORMAL
UROBILINOGEN UR QL STRIP.AUTO: 0.2 EU/DL (ref 0.2–1)
WBC URNS QL MICRO: ABNORMAL /HPF (ref 0–4)

## 2019-07-10 PROCEDURE — 99281 EMR DPT VST MAYX REQ PHY/QHP: CPT

## 2019-07-10 PROCEDURE — 77030005563 HC CATH URETH INT MMGH -A

## 2019-07-10 PROCEDURE — 74022 RADEX COMPL AQT ABD SERIES: CPT

## 2019-07-10 PROCEDURE — 81001 URINALYSIS AUTO W/SCOPE: CPT

## 2019-07-10 PROCEDURE — 99282 EMERGENCY DEPT VISIT SF MDM: CPT

## 2019-07-10 PROCEDURE — 51701 INSERT BLADDER CATHETER: CPT

## 2019-07-10 NOTE — ED PROVIDER NOTES
EMERGENCY DEPARTMENT HISTORY AND PHYSICAL EXAM      Date: 7/10/2019  Patient Name: Viola Pacheco  Patient Age and Sex: 80 y.o. male    History of Presenting Illness     Chief Complaint   Patient presents with    Urinary Retention       History Provided By: Patient    HPI: Viola Pacheco, 80 y.o. male who was seen in the ED just a few days ago for constipation presents today with difficulty passing urine. He has had urinary retention in the past secondary to an enlarged prostate. During his last visit, he was evaluated for constipation. We reviewed his meds and it appeared that he was prescribed Robaxin by 2 different providers for 2 different issues and therefore had been taking twice the dose recommended for the past week or so. This probably at least contributed to his constipation. We discussed that he should only take 1 dose of the medication and only as needed for musculoskeletal back pain. In addition, we have added a stool softener and fiber to his diet. He says that he has had normal bowel movements now the last one being yesterday. Still has some mild abdominal cramping prior to a bowel movement but this quickly resolves with passing stool. He comes in today because since yesterday evening he has had difficulty passing his urine. He feels like he has to go to the bathroom but then when he does \"it only dribbles \". There is no dysuria or hematuria. He has had urinary retention in the past and sometimes it resolved on its own but other times he has required the placement of a Vegas for a few days. Pt denies any other alleviating or exacerbating factors. Additionally, pt specifically denies any recent fever, chills, headache, nausea, vomiting, CP, SOB, lightheadedness, dizziness, numbness, weakness, heart palpitations, cough, or congestion.      Past Medical History:   Diagnosis Date    CAD (coronary artery disease) 08/21/2017    PCI and stent    Cancer [facility])     Prostate CA    Diabetes ([facility] [address]  Hypertension     Myocardial infarct ([facility] [address] 2000    Sleep apnea     wears C-PAP    Sleep disorder     Sleep Apnea C-PAP use at home      Past Surgical History:   Procedure Laterality Date    CARDIAC SURG PROCEDURE UNLIST  2000    CABG    HX COLECTOMY  2012    HX CORONARY STENT PLACEMENT      HX PROSTATECTOMY         PCP: Sylvia Buchanan MD    There are no other complaints, changes or physical findings at this time. Past History   Past Medical History:  Past Medical History:   Diagnosis Date    CAD (coronary artery disease) 08/21/2017    PCI and stent    Cancer ([facility] [address]     Prostate CA    [facility]     Hypertension     [facility]ct [facility] 2000    Sleep apnea     wears C-PAP    Sleep disorder     Sleep Apnea C-PAP use at home        Past Surgical History:  Past Surgical History:   Procedure Laterality Date    CARDIAC SURG PROCEDURE UNLIST  2000    CABG    HX COLECTOMY  2012    HX CORONARY STENT PLACEMENT      HX PROSTATECTOMY         Family History:  Family History   Problem Relation Age of Onset    Heart Disease Sister        Social History:  Social History     Tobacco Use    Smoking status: Never Smoker    Smokeless tobacco: Never Used   Substance Use Topics    Alcohol use: No    Drug use: Yes       Allergies: Allergies   Allergen Reactions    Iodine Other (comments)     \"it gives me a headache\"       Current Medications:  No current facility-administered medications on file prior to encounter. Current Outpatient Medications on File Prior to Encounter   Medication Sig Dispense Refill    traMADol (ULTRAM) 50 mg tablet Take 1 Tab by mouth every six (6) hours as needed for Pain for up to 3 days. Max Daily Amount: 200 mg. 12 Tab 0    methocarbamol (ROBAXIN) 500 mg tablet Take 1 Tab by mouth four (4) times daily. 20 Tab 0    ascorbic acid, vitamin C, (VITAMIN C) 500 mg tablet Take 500 mg by mouth daily.       clopidogrel (PLAVIX) 75 mg tab Take 1 Tab by mouth daily. Indications: Thrombosis Prevention after PCI 90 Tab 3    metoprolol tartrate (LOPRESSOR) 25 mg tablet Take 0.5 Tabs by mouth daily. 30 Tab 3    docusate sodium (COLACE) 100 mg capsule Take 100 mg by mouth nightly as needed for Constipation.  VITAMIN E/QUININE SULFATE (LEG CRAMP TREATMENT PO) Take 1 Cap by mouth nightly. Filiberto's leg cramps      aspirin delayed-release 81 mg tablet Take 1 Tab by mouth daily. 100 Tab 4    pravastatin (PRAVACHOL) 20 mg tablet Take 20 mg by mouth nightly.  amLODIPine (NORVASC) 10 mg tablet Take 10 mg by mouth daily.  lisinopril (PRINIVIL, ZESTRIL) 40 mg tablet Take 40 mg by mouth daily.  bicalutamide (CASODEX) 50 mg tablet Take 50 mg by mouth daily. Review of Systems   Review of Systems   Constitutional: Negative for appetite change, chills and fever. Respiratory: Negative for cough, chest tightness and shortness of breath. Cardiovascular: Negative for chest pain, palpitations and leg swelling. Gastrointestinal: Positive for constipation. Negative for abdominal distention, abdominal pain, blood in stool, diarrhea, nausea and vomiting. Genitourinary: Positive for difficulty urinating. Negative for decreased urine volume, dysuria, flank pain, frequency and hematuria. Musculoskeletal: Negative for arthralgias, joint swelling, myalgias, neck pain and neck stiffness. Neurological: Negative for dizziness, weakness, light-headedness, numbness and headaches. Hematological: Negative for adenopathy. All other systems reviewed and are negative. Physical Exam   Physical Exam   Constitutional: He is oriented to person, place, and time. He appears well-developed and well-nourished. No distress. HENT:   Head: Atraumatic. Mouth/Throat: Oropharynx is clear and moist.   Eyes: Pupils are equal, round, and reactive to light. Conjunctivae and EOM are normal. No scleral icterus. Neck: Normal range of motion. Neck supple. No JVD present. Cardiovascular: Normal rate, regular rhythm, normal heart sounds and intact distal pulses. Pulmonary/Chest: Effort normal and breath sounds normal. He exhibits no tenderness. Abdominal: Soft. Bowel sounds are normal. He exhibits no distension. There is no tenderness. Musculoskeletal: Normal range of motion. He exhibits no edema. Neurological: He is alert and oriented to person, place, and time. No cranial nerve deficit. Skin: Skin is warm and dry. He is not diaphoretic. Nursing note and vitals reviewed. Diagnostic Study Results     Labs -  Recent Results (from the past 24 hour(s))   URINALYSIS W/ REFLEX CULTURE    Collection Time: 07/10/19  5:52 AM   Result Value Ref Range    Color YELLOW/STRAW      Appearance CLEAR CLEAR      Specific gravity 1.020 1.003 - 1.030      pH (UA) 5.5 5.0 - 8.0      Protein 100 (A) NEG mg/dL    Glucose NEGATIVE  NEG mg/dL    Ketone NEGATIVE  NEG mg/dL    Bilirubin NEGATIVE  NEG      Blood SMALL (A) NEG      Urobilinogen 0.2 0.2 - 1.0 EU/dL    Nitrites NEGATIVE  NEG      Leukocyte Esterase NEGATIVE  NEG      WBC 0-4 0 - 4 /hpf    RBC 0-5 0 - 5 /hpf    Epithelial cells FEW FEW /lpf    Bacteria NEGATIVE  NEG /hpf    UA:UC IF INDICATED CULTURE NOT INDICATED BY UA RESULT CNI      Hyaline cast 2-5 0 - 5 /lpf       Radiologic Studies -   XR ABD ACUTE W 1 V CHEST   Final Result   IMPRESSION:    No acute abnormality in the chest or abdomen. CT Results  (Last 48 hours)    None        CXR Results  (Last 48 hours)               07/10/19 0812  XR ABD ACUTE W 1 V CHEST Final result    Impression:  IMPRESSION:    No acute abnormality in the chest or abdomen. Narrative:  EXAM:  CR abdomen acute with PA chest       INDICATION: Abdominal pain. COMPARISON: Chest radiographs 5/24/2019. CT abdomen pelvis 7/3/2019. TECHNIQUE: Frontal upright chest view and frontal supine and upright abdomen   views       FINDINGS: Sternal wires are present.  The cardiac mediastinal contours are   stable. There is stable minimal bibasilar scarring. There is no focal   consolidation, pleural effusion, or pneumothorax. There is a small amount of colonic stool. There are no dilated bowel loops,   air-fluid levels, or intraperitoneal free air. Sutures and surgical clips are   noted. The bones are stable. Medical Decision Making   I am the first provider for this patient. I reviewed the vital signs, available nursing notes, past medical history, past surgical history, family history and social history. Vital Signs-Reviewed the patient's vital signs. Patient Vitals for the past 24 hrs:   Temp Pulse Resp BP SpO2   07/10/19 0615    155/65    07/10/19 0546 97.7 °F (36.5 °C) 75 20 152/78 92 %     Records Reviewed: Nursing Notes, Old Medical Records, Previous electrocardiograms, Previous Radiology Studies and Previous Laboratory Studies    Provider Notes (Medical Decision Making):     Mr. Hilda Scott is very well-appearing today, sitting up in bed has normal vital signs. His physical exam is unremarkable. We have scanned his bladder and it contains approximately 400 cc of urine. Because he has some suprapubic discomfort with this, we have straight cath him and will send the urine for analysis and culture. 9:45 AM  The suprapubic pressure has improved after the cath. The patient now remains asymptomatic. We have given him some fluids and he wanted to attempt and void on his own rather than having a catheter placed. He just had a good amount of urine output does not feel like she has to urinate any longer. There is no hematuria in the urine appears normal.  I have discussed with him in detail the fact that his urinary retention may return in which case he has to come back for Vegas placement. He has been through this before and he verbalizes agreement that he will do so.   Because his urinary retention has resolved on its own on several previous occasions, I think it is reasonable to hold off on placing a Vegas right now. Pt has remained asymptomatic with normal vital signs and feels comfortable going home. I think this is reasonable as no findings today suggest a life-threatening condition. Pt was discharged and was provided written discharge instructions. Additional verbal instructions and return precautions were given and discussed in detail. Pt was asked to return to the ED immediately for any new or concerning symptoms or if they worsen. Advised to follow-up with PMD or specialist as instructed. Pt was in agreement, endorsed understanding, and all questions were answered. ED Course:   Initial assessment performed. The patients presenting problems have been discussed, and they are in agreement with the care plan formulated and outlined with them. I have encouraged them to ask questions as they arise throughout their visit. I reviewed our electronic medical record system for any past medical records that were available that may contribute to the patient's current condition, the nursing notes and vital signs from today's visit. Sammy Pacheco MD      Progress Note:  Patient has been reassessed and reports feeling better and symptoms have improved after ED treatment. Celestino Coyle is able to tolerate PO and ambulate per baseline. Anusha Lozano's final labs and imaging have been reviewed with him. He has been counseled regarding his diagnosis. He verbally conveys understanding and agreement of the signs, symptoms, diagnosis, treatment and prognosis and additionally agrees to follow up as recommended with Dr. Laurita Gale, Tiffany Johnson MD in 24 - 48 hours. He also agrees with the care-plan and conveys that all of his questions have been answered.   I have also put together some discharge instructions for him that include: 1) educational information regarding their diagnosis, 2) how to care for their diagnosis at home, as well a 3) list of reasons why they would want to return to the ED prior to their follow-up appointment, should their condition change. I have answered all questions to the patient's satisfaction. Strict return precautions given. He both understood and agreed with plan as discussed above. Vital signs stable for discharge. Disposition: DISCHARGE     The pt is ready for discharge, feels considerably better, has normal vital signs and feels comfortable going home. I think this is reasonable as no findings today suggest a life-threatening condition. The pt's signs, symptoms, diagnosis, and discharge instructions have been discussed in detail and pt has conveyed their understanding. Written discharge instructions provided. The pt is to follow up as recommended or return to ER should their symptoms worsen. Plan has been discussed, all questions answered and pt is in agreement. Diagnosis     Clinical Impression:   1. Urinary retention    2. Abdominal cramping        Attestation:  I personally performed the services described in this documentation on this date 7/10/2019 for patient hSe Mead. Cathleen Newell MD    Please note that this dictation was completed with [employer], the computer voice recognition software. Quite often unanticipated grammatical, syntax, homophones, and other interpretive errors are inadvertently transcribed by the computer software. Please disregard these errors. Please excuse any errors that have escaped final proofreading.

## 2019-07-10 NOTE — ED NOTES
Sterile technique used to perform straight cath for urine collection. Allergies verified prior to procedure. Patient tolerated well.

## 2019-07-10 NOTE — DISCHARGE INSTRUCTIONS
Patient Education        Urinary Retention: Care Instructions  Your Care Instructions    Urinary retention means that you aren't able to urinate. In men, it is often caused by a blockage of the urinary tract from an enlarged prostate gland. In men and women, it can also be caused by an infection or nerve damage. Or it may be a side effect of a medicine. The doctor may have drained the urine from your bladder. If you still have problems passing urine, you may need to use a catheter at home. This is used to empty your bladder until the problem can be fixed. Your doctor may put a catheter in your bladder before you go home. If so, he or she will tell you when to come back to have the catheter removed. The doctor has checked you closely. But problems can develop later. If you notice any problems or new symptoms, get medical treatment right away. Follow-up care is a key part of your treatment and safety. Be sure to make and go to all appointments, and call your doctor if you are having problems. It's also a good idea to know your test results and keep a list of the medicines you take. How can you care for yourself at home? · Take your medicines exactly as prescribed. Call your doctor if you think you are having a problem with your medicine. You will get more details on the specific medicines your doctor prescribes. · Check with your doctor before you use any over-the-counter medicines. Many cold and allergy medicines, for example, can make this problem worse. Make sure your doctor knows all of the medicines, vitamins, supplements, and herbal remedies you take. · Spread out through the day the amount of fluid you drink. Do not drink a lot at bedtime. · Avoid alcohol and caffeine. · If you have been given a catheter, or if one is already in place, follow the instructions you were given. Always wash your hands before and after you handle the catheter. When should you call for help?   Call your doctor now or seek immediate medical care if:    · You cannot urinate at all, or it is getting harder to urinate.     · You have symptoms of a urinary tract infection. These may include:  ? Pain or burning when you urinate. ? A frequent need to urinate without being able to pass much urine. ? Pain in the flank, which is just below the rib cage and above the waist on either side of the back. ? Blood in your urine. ? A fever.    Watch closely for changes in your health, and be sure to contact your doctor if:    · You have any problems with your catheter.     · You do not get better as expected. Where can you learn more? Go to http://salvatore-thu.info/. Enter M244 in the search box to learn more about \"Urinary Retention: Care Instructions. \"  Current as of: March 20, 2018  Content Version: 11.9  © 0673-1088 [employer]. Care instructions adapted under license by [employer] (which disclaims liability or warranty for this information). If you have questions about a medical condition or this instruction, always ask your healthcare professional. [place] [address] any warranty or liability for your use of this information. Patient Education        Abdominal Pain: Care Instructions  Your Care Instructions    Abdominal pain has many possible causes. Some aren't serious and get better on their own in a few days. Others need more testing and treatment. If your pain continues or gets worse, you need to be rechecked and may need more tests to find out what is wrong. You may need surgery to correct the problem. Don't ignore new symptoms, such as fever, nausea and vomiting, urination problems, pain that gets worse, and dizziness. These may be signs of a more serious problem. Your doctor may have recommended a follow-up visit in the next 8 to 12 hours. If you are not getting better, you may need more tests or treatment.   The doctor has checked you carefully, but problems can develop later. If you notice any problems or new symptoms, get medical treatment right away. Follow-up care is a key part of your treatment and safety. Be sure to make and go to all appointments, and call your doctor if you are having problems. It's also a good idea to know your test results and keep a list of the medicines you take. How can you care for yourself at home? · Rest until you feel better. · To prevent dehydration, drink plenty of fluids, enough so that your urine is light yellow or clear like water. Choose water and other caffeine-free clear liquids until you feel better. If you have kidney, heart, or liver disease and have to limit fluids, talk with your doctor before you increase the amount of fluids you drink. · If your stomach is upset, eat mild foods, such as rice, dry toast or crackers, bananas, and applesauce. Try eating several small meals instead of two or three large ones. · Wait until 48 hours after all symptoms have gone away before you have spicy foods, alcohol, and drinks that contain caffeine. · Do not eat foods that are high in fat. · Avoid anti-inflammatory medicines such as aspirin, ibuprofen (Advil, Motrin), and naproxen (Aleve). These can cause stomach upset. Talk to your doctor if you take daily aspirin for another health problem. When should you call for help? Call 911 anytime you think you may need emergency care. For example, call if:    · You passed out (lost consciousness).     · You pass maroon or very bloody stools.     · You vomit blood or what looks like coffee grounds.     · You have new, severe belly pain.    Call your doctor now or seek immediate medical care if:    · Your pain gets worse, especially if it becomes focused in one area of your belly.     · You have a new or higher fever.     · Your stools are black and look like tar, or they have streaks of blood.     · You have unexpected vaginal bleeding.     · You have symptoms of a urinary tract infection. These may include:  ? Pain when you urinate. ? Urinating more often than usual.  ? Blood in your urine.     · You are dizzy or lightheaded, or you feel like you may faint.    Watch closely for changes in your health, and be sure to contact your doctor if:    · You are not getting better after 1 day (24 hours). Where can you learn more? Go to http://salvatore-thu.info/. Enter Y414 in the search box to learn more about \"Abdominal Pain: Care Instructions. \"  Current as of: September 23, 2018  Content Version: 11.9  © 8788-2179 [employer]. Care instructions adapted under license by [employer] (which disclaims liability or warranty for this information). If you have questions about a medical condition or this instruction, always ask your healthcare professional. [address] any warranty or liability for your use of this information.

## 2019-07-10 NOTE — ED NOTES
Bedside and Verbal shift change report given to Alma Smith (oncoming nurse) by Enmanuel Clark (offgoing nurse). Report included the following information SBAR, ED Summary, MAR and Recent Results.

## 2019-07-10 NOTE — ED NOTES
Pt is A&O x4, GCS of 15. Pt reports he is barely urinating * reports RLQ pain (10/10) Pt   states he was here yesterday for the same issue & states \"the pain meds are not working. PMH cardiac bypass approx 2007.   Pt denies SOB, HA, N/T, N/V.

## 2019-07-17 NOTE — ED PROVIDER NOTES
EMERGENCY DEPARTMENT HISTORY AND PHYSICAL EXAM      Date: 7/8/2019  Patient Name: Samantha Gutierrez    History of Presenting Illness     Chief Complaint   Patient presents with    Flank Pain       History Provided By: Patient    HPI: Samantha Gutierrez, 80 y.o. male with PMHx significant for pancreatitis, diverticular ptosis, MI, and prostate cancer presents to the emergency room with right flank pain that feels \"like a punch\". Pain is severe and is causing him to have difficulty sleeping. He denies dysuria, hematuria, nausea, vomiting, fever, chills, shortness of breath, chest pain. Patient was seen in the emergency room on July 3,  2019 for similar symptoms and was diagnosed with constipation. His symptoms have not resolved with treatment. He denies midline low back pain, or pain that radiates down his legs bilaterally. He denies bowel or bladder dysfunction, numbness, tingling, or weakness. There are no other complaints, changes, or physical findings at this time. PCP: Any Baez MD    No current facility-administered medications on file prior to encounter. Current Outpatient Medications on File Prior to Encounter   Medication Sig Dispense Refill    ascorbic acid, vitamin C, (VITAMIN C) 500 mg tablet Take 500 mg by mouth daily.  clopidogrel (PLAVIX) 75 mg tab Take 1 Tab by mouth daily. Indications: Thrombosis Prevention after PCI 90 Tab 3    metoprolol tartrate (LOPRESSOR) 25 mg tablet Take 0.5 Tabs by mouth daily. 30 Tab 3    docusate sodium (COLACE) 100 mg capsule Take 100 mg by mouth nightly as needed for Constipation.  VITAMIN E/QUININE SULFATE (LEG CRAMP TREATMENT PO) Take 1 Cap by mouth nightly. Filiberto's leg cramps      aspirin delayed-release 81 mg tablet Take 1 Tab by mouth daily. 100 Tab 4    pravastatin (PRAVACHOL) 20 mg tablet Take 20 mg by mouth nightly.  amLODIPine (NORVASC) 10 mg tablet Take 10 mg by mouth daily.       lisinopril (PRINIVIL, ZESTRIL) 40 mg tablet Take 40 mg by mouth daily.  bicalutamide (CASODEX) 50 mg tablet Take 50 mg by mouth daily. Past History     Past Medical History:  Past Medical History:   Diagnosis Date    CAD (coronary artery disease) 08/21/2017    PCI and stent    [facility] ([facility] [address]     Prostate CA    [facility]     Hypertension     Myocardial infarct ([facility] 2000    Sleep apnea     wears C-PAP    Sleep disorder     Sleep Apnea C-PAP use at home        Past Surgical History:  Past Surgical History:   Procedure Laterality Date    CARDIAC SURG PROCEDURE UNLIST  2000    CABG    HX COLECTOMY  2012    HX CORONARY STENT PLACEMENT      HX PROSTATECTOMY         Family History:  Family History   Problem Relation Age of Onset    Heart Disease Sister        Social History:  Social History     Tobacco Use    Smoking status: Never Smoker    Smokeless tobacco: Never Used   Substance Use Topics    Alcohol use: No    Drug use: Yes       Allergies: Allergies   Allergen Reactions    Iodine Other (comments)     \"it gives me a headache\"         Review of Systems   Review of Systems   Constitutional: Negative for chills and fever. HENT: Negative for congestion, rhinorrhea and sinus pressure. Eyes: Negative. Respiratory: Negative for cough, chest tightness, shortness of breath and wheezing. Cardiovascular: Negative for palpitations and leg swelling. Gastrointestinal: Positive for constipation. Negative for abdominal pain, blood in stool, diarrhea, nausea and vomiting. Genitourinary: Positive for flank pain. Negative for dysuria, hematuria and scrotal swelling. Musculoskeletal: Positive for back pain. Negative for gait problem, joint swelling, myalgias and neck stiffness. Skin: Negative for rash and wound. Neurological: Negative for dizziness, syncope, weakness, light-headedness, numbness and headaches. Psychiatric/Behavioral: Positive for confusion. The patient is not nervous/anxious.           Physical Exam General appearance - well nourished, well appearing, and in no distress  Eyes - pupils equal and reactive, extraocular eye movements intact  ENT - mucous membranes moist, pharynx normal without lesions  Neck - supple, no significant adenopathy; non-tender to palpation  Chest - clear to auscultation, no wheezes, rales or rhonchi; non-tender to palpation  Heart - normal rate and regular rhythm, S1 and S2 normal, no murmurs noted  Abdomen - soft, no anterior abdominal tenderness, tenderness to palpation in right flank, no rebound or guarding, nondistended, no masses or organomegaly  Musculoskeletal - no joint tenderness, deformity or swelling; normal ROM  Extremities - peripheral pulses normal, no pedal edema  Skin - normal coloration and turgor, no rashes  Neurological - alert, oriented x3, normal speech, no focal findings or movement disorder noted    Diagnostic Study Results     Labs -   No results found for this or any previous visit (from the past 12 hour(s)). Radiologic Studies -   No orders to display     CT Results  (Last 48 hours)    None        CXR Results  (Last 48 hours)    None            Medical Decision Making   I am the first provider for this patient. I reviewed the vital signs, available nursing notes, past medical history, past surgical history, family history and social history. Vital Signs-Reviewed the patient's vital signs. Records Reviewed: Nursing Notes and Old Medical Records    Provider Notes (Medical Decision Making):   Differential diagnosis: Diverticulitis, appendicitis, constipation, kidney stone, muscle strain    ED Course:   Initial assessment performed. The patients presenting problems have been discussed, and they are in agreement with the care plan formulated and outlined with them. I have encouraged them to ask questions as they arise throughout their visit. Progress Notes:   Patient is feeling better after analgesics in the ED.   Labs and x-ray do not show any significant abnormality. CT reviewed from July 3 does not show any likely cause of his symptoms. Will treat for musculoskeletal pain. Disposition:  Discharge home    PLAN:  1. Discharge Medication List as of 2019  7:27 AM      START taking these medications    Details   traMADol (ULTRAM) 50 mg tablet Take 1 Tab by mouth every six (6) hours as needed for Pain for up to 3 days. Max Daily Amount: 200 mg., Print, Disp-12 Tab, R-0         CONTINUE these medications which have CHANGED    Details   methocarbamol (ROBAXIN) 500 mg tablet Take 1 Tab by mouth four (4) times daily. , Print, Disp-20 Tab, R-0         CONTINUE these medications which have NOT CHANGED    Details   ascorbic acid, vitamin C, (VITAMIN C) 500 mg tablet Take 500 mg by mouth daily. , Historical Med      clopidogrel (PLAVIX) 75 mg tab Take 1 Tab by mouth daily. Indications: Thrombosis Prevention after PCI, Print, Disp-90 Tab, R-3      metoprolol tartrate (LOPRESSOR) 25 mg tablet Take 0.5 Tabs by mouth daily. , No Print, Disp-30 Tab, R-3      docusate sodium (COLACE) 100 mg capsule Take 100 mg by mouth nightly as needed for Constipation. , Historical Med      VITAMIN E/QUININE SULFATE (LEG CRAMP TREATMENT PO) Take 1 Cap by mouth nightly. Filiberto's leg cramps, Historical Med      aspirin delayed-release 81 mg tablet Take 1 Tab by mouth daily. , Print, Disp-100 Tab, R-4      pravastatin (PRAVACHOL) 20 mg tablet Take 20 mg by mouth nightly., Historical Med      amLODIPine (NORVASC) 10 mg tablet Take 10 mg by mouth daily. , Historical Med      lisinopril (PRINIVIL, ZESTRIL) 40 mg tablet Take 40 mg by mouth daily. , Historical Med      bicalutamide (CASODEX) 50 mg tablet Take 50 mg by mouth daily. , Historical Med         STOP taking these medications       lactulose (CHRONULAC) 10 gram/15 mL solution Comments:   Reason for Stoppin.   Follow-up Information     Follow up With Specialties Details Why Contact Info    [facility] EMERGENCY DEPT Emergency Medicine   [address] [address]  Stef Saleh MD Family Practice Schedule an appointment as soon as possible for a visit  [address]  [phone]          Return to ED if worse     Diagnosis     Clinical Impression:   1.  Acute right flank pain

## 2019-09-20 ENCOUNTER — HOSPITAL ENCOUNTER (EMERGENCY)
Age: 84
Discharge: HOME | End: 2019-09-20
Attending: EMERGENCY MEDICINE
Payer: MEDICARE

## 2019-09-20 VITALS
RESPIRATION RATE: 20 BRPM | WEIGHT: 262.79 LBS | OXYGEN SATURATION: 94 % | HEART RATE: 78 BPM | TEMPERATURE: 97.3 F | DIASTOLIC BLOOD PRESSURE: 86 MMHG | SYSTOLIC BLOOD PRESSURE: 175 MMHG | HEIGHT: 66 IN | BODY MASS INDEX: 42.23 KG/M2

## 2019-09-20 DIAGNOSIS — T78.3XXA ACE INHIBITOR-AGGRAVATED ANGIOEDEMA, INITIAL ENCOUNTER: Primary | ICD-10-CM

## 2019-09-20 DIAGNOSIS — T46.4X5A ACE INHIBITOR-AGGRAVATED ANGIOEDEMA, INITIAL ENCOUNTER: Primary | ICD-10-CM

## 2019-09-20 PROCEDURE — 99284 EMERGENCY DEPT VISIT MOD MDM: CPT

## 2019-09-20 PROCEDURE — 74011250636 HC RX REV CODE- 250/636: Performed by: EMERGENCY MEDICINE

## 2019-09-20 PROCEDURE — 96374 THER/PROPH/DIAG INJ IV PUSH: CPT

## 2019-09-20 PROCEDURE — 96375 TX/PRO/DX INJ NEW DRUG ADDON: CPT

## 2019-09-20 RX ORDER — DIPHENHYDRAMINE HYDROCHLORIDE 50 MG/ML
25 INJECTION, SOLUTION INTRAMUSCULAR; INTRAVENOUS
Status: COMPLETED | OUTPATIENT
Start: 2019-09-20 | End: 2019-09-20

## 2019-09-20 RX ORDER — FAMOTIDINE 10 MG/ML
20 INJECTION INTRAVENOUS
Status: COMPLETED | OUTPATIENT
Start: 2019-09-20 | End: 2019-09-20

## 2019-09-20 RX ORDER — DEXAMETHASONE SODIUM PHOSPHATE 4 MG/ML
10 INJECTION, SOLUTION INTRA-ARTICULAR; INTRALESIONAL; INTRAMUSCULAR; INTRAVENOUS; SOFT TISSUE
Status: COMPLETED | OUTPATIENT
Start: 2019-09-20 | End: 2019-09-20

## 2019-09-20 RX ADMIN — DEXAMETHASONE SODIUM PHOSPHATE 10 MG: 4 INJECTION, SOLUTION INTRAMUSCULAR; INTRAVENOUS at 08:32

## 2019-09-20 RX ADMIN — FAMOTIDINE 20 MG: 10 INJECTION, SOLUTION INTRAVENOUS at 08:29

## 2019-09-20 RX ADMIN — DIPHENHYDRAMINE HYDROCHLORIDE 25 MG: 50 INJECTION, SOLUTION INTRAMUSCULAR; INTRAVENOUS at 08:27

## 2019-09-20 NOTE — ED PROVIDER NOTES
EMERGENCY DEPARTMENT HISTORY AND PHYSICAL EXAM      Date: 9/20/2019  Patient Name: Elroy Ernst    History of Presenting Illness     Chief Complaint   Patient presents with    Allergic Reaction     Patient noted swelling on Thursday to his upper lip. He went to his PCP and they took him off his lisinopril but his lip is getting bigger. No problems breathing. History Provided By: Patient    HPI: Elroy Ernst, 80 y.o. male  presents to the ED with cc of upper lip swelling. Patient started having swelling of his upper lip 2 days ago. He went to his primary care doctor yesterday who discontinued his lisinopril. He went home and he states overnight the lip swelling is worse. He has not taken any antihistamines or steroids. He states nothing was prescribed to him or advised to take anything. He denies any swelling of his tongue or in his neck. He denies any trouble breathing or trouble swallowing. He has no nausea vomiting or diarrhea. Abdominal pain. No chest pain. There are no other complaints, changes, or physical findings at this time. PCP: Frank Reza MD    No current facility-administered medications on file prior to encounter. Current Outpatient Medications on File Prior to Encounter   Medication Sig Dispense Refill    methocarbamol (ROBAXIN) 500 mg tablet Take 1 Tab by mouth four (4) times daily. 20 Tab 0    ascorbic acid, vitamin C, (VITAMIN C) 500 mg tablet Take 500 mg by mouth daily.  clopidogrel (PLAVIX) 75 mg tab Take 1 Tab by mouth daily. Indications: Thrombosis Prevention after PCI 90 Tab 3    metoprolol tartrate (LOPRESSOR) 25 mg tablet Take 0.5 Tabs by mouth daily. 30 Tab 3    docusate sodium (COLACE) 100 mg capsule Take 100 mg by mouth nightly as needed for Constipation.  VITAMIN E/QUININE SULFATE (LEG CRAMP TREATMENT PO) Take 1 Cap by mouth nightly. Filiberto's leg cramps      aspirin delayed-release 81 mg tablet Take 1 Tab by mouth daily.  100 Tab 4    pravastatin (PRAVACHOL) 20 mg tablet Take 20 mg by mouth nightly.  amLODIPine (NORVASC) 10 mg tablet Take 10 mg by mouth daily.  bicalutamide (CASODEX) 50 mg tablet Take 50 mg by mouth daily. Past History     Past Medical History:  Past Medical History:   Diagnosis Date    CAD (coronary artery disease) 08/21/2017    PCI and stent    Cancer ([facility]     Prostate CA    Diabetes ([facility]     Hypertension     Myocardial infarct ([facility] 2000    Sleep apnea     wears C-PAP    Sleep disorder     Sleep Apnea C-PAP use at home        Past Surgical History:  Past Surgical History:   Procedure Laterality Date    CARDIAC SURG PROCEDURE UNLIST  2000    CABG    HX COLECTOMY  2012    HX CORONARY STENT PLACEMENT      HX PROSTATECTOMY         Family History:  Family History   Problem Relation Age of Onset    Heart Disease Sister        Social History:  Social History     Tobacco Use    Smoking status: Never Smoker    Smokeless tobacco: Never Used   Substance Use Topics    Alcohol use: No    Drug use: Yes       Allergies: Allergies   Allergen Reactions    Iodine Other (comments)     \"it gives me a headache\"         Review of Systems   Review of Systems   Constitutional: Negative for chills and fever. HENT: Negative for congestion, ear pain, rhinorrhea, sore throat and trouble swallowing. Upper lip swelling   Eyes: Negative for visual disturbance. Respiratory: Negative for cough, chest tightness and shortness of breath. Cardiovascular: Negative for chest pain and palpitations. Gastrointestinal: Negative for abdominal pain, blood in stool, constipation, diarrhea, nausea and vomiting. Genitourinary: Negative for decreased urine volume, difficulty urinating, dysuria and frequency. Musculoskeletal: Negative for back pain and neck pain. Skin: Negative for color change and rash. Neurological: Negative for dizziness, weakness, light-headedness and headaches.        Physical Exam Physical Exam   Constitutional: He is oriented to person, place, and time. He appears well-developed and well-nourished. He does not appear ill. No distress. HENT:   Mouth/Throat: Oropharynx is clear and moist.   Swelling of the upper lip. There is no tongue swelling. Eyes: Conjunctivae are normal.   Neck: Neck supple. Cardiovascular: Normal rate and regular rhythm. Pulmonary/Chest: Effort normal and breath sounds normal. No accessory muscle usage. No respiratory distress. Abdominal: Soft. He exhibits no distension. There is no tenderness. Lymphadenopathy:     He has no cervical adenopathy. Neurological: He is alert and oriented to person, place, and time. He has normal strength. No cranial nerve deficit or sensory deficit. Skin: Skin is warm and dry. Nursing note and vitals reviewed. Diagnostic Study Results     Labs -   No results found for this or any previous visit (from the past 24 hour(s)). Radiologic Studies -   No orders to display     CT Results  (Last 48 hours)    None        CXR Results  (Last 48 hours)    None            Medical Decision Making   I am the first provider for this patient. I reviewed the vital signs, available nursing notes, past medical history, past surgical history, family history and social history. Vital Signs-Reviewed the patient's vital signs. Patient Vitals for the past 24 hrs:   Temp Pulse Resp BP SpO2   09/20/19 1120  78 20 175/86 94 %   09/20/19 1010  64 16 (!) 212/100 95 %   09/20/19 0834     100 %   09/20/19 0759 97.3 °F (36.3 °C) 65 20 172/88 100 %       Records Reviewed: Nursing Notes and Old Medical Records    Provider Notes (Medical Decision Making):   Patient is presenting with upper lip swelling likely related to his ACE inhibitor. He stopped the lisinopril yesterday. He has not been taking any antihistamines or medication for the swelling. He has no airway compromise. He is able to swallow. He has no shortness of breath. No difficulty speaking. Patient's swelling decreased here in the emergency department with Benadryl Pepcid and steroids. Will recommend that he continue the antihistamines and H2 blocker at home. He has stopped his lisinopril. He is taking additional doses of metoprolol to help control blood pressure. ED Course:   Initial assessment performed. The patients presenting problems have been discussed, and they are in agreement with the care plan formulated and outlined with them. I have encouraged them to ask questions as they arise throughout their visit. Orders Placed This Encounter    SALINE LOCK IV ONE TIME STAT    famotidine (PF) (PEPCID) injection 20 mg    diphenhydrAMINE (BENADRYL) injection 25 mg    dexamethasone (DECADRON) 4 mg/mL injection 10 mg         Critical Care Time:   0    Disposition:  Discharge  12:10 PM    The patient's emergency department evaluation is now complete. I have reviewed all labs, imaging, and pertinent information. I have discussed all results with the patient and/or family. Based on our evaluation today I do believe that the patient is safe to be discharged home. The patient has been provided with at home instructions that are pertinent to their complaint today, although these may not be specific to the exact diagnosis. I have reviewed the patient's home medications and attempted to reconcile if not already done so by pharmacy or nursing staff. I have discussed all new prescriptions with the patient. The patient has been encouraged to follow-up with primary care doctor and/or specialist, and these have been discussed with the patient. The patient has been advised that they may return to the emergency department if they have any worsening symptoms and or new symptoms that are of concern to them. Verbal discharge instructions may have also been provided to the patient that may not be specifically contained in the written discharge instructions.   The patient has been given opportunity to ask questions prior to discharge. PLAN:  1. Discharge Medication List as of 2019 11:52 AM      CONTINUE these medications which have NOT CHANGED    Details   methocarbamol (ROBAXIN) 500 mg tablet Take 1 Tab by mouth four (4) times daily. , Print, Disp-20 Tab, R-0      ascorbic acid, vitamin C, (VITAMIN C) 500 mg tablet Take 500 mg by mouth daily. , Historical Med      clopidogrel (PLAVIX) 75 mg tab Take 1 Tab by mouth daily. Indications: Thrombosis Prevention after PCI, Print, Disp-90 Tab, R-3      metoprolol tartrate (LOPRESSOR) 25 mg tablet Take 0.5 Tabs by mouth daily. , No Print, Disp-30 Tab, R-3      docusate sodium (COLACE) 100 mg capsule Take 100 mg by mouth nightly as needed for Constipation. , Historical Med      VITAMIN E/QUININE SULFATE (LEG CRAMP TREATMENT PO) Take 1 Cap by mouth nightly. Filiberto's leg cramps, Historical Med      aspirin delayed-release 81 mg tablet Take 1 Tab by mouth daily. , Print, Disp-100 Tab, R-4      pravastatin (PRAVACHOL) 20 mg tablet Take 20 mg by mouth nightly., Historical Med      amLODIPine (NORVASC) 10 mg tablet Take 10 mg by mouth daily. , Historical Med      bicalutamide (CASODEX) 50 mg tablet Take 50 mg by mouth daily. , Historical Med         STOP taking these medications       lisinopril (PRINIVIL, ZESTRIL) 40 mg tablet Comments:   Reason for Stoppin.   Follow-up Information     Follow up With Specialties Details Why Contact Info    Deep Polanco MD [facility] Schedule an appointment as soon as possible for a visit in 1 week  [place] [id number]  [phone]          Return to ED if worse     Diagnosis     Clinical Impression:   1. ACE inhibitor-aggravated angioedema, initial encounter            This note will not be viewable in [employer].

## 2019-09-20 NOTE — ED TRIAGE NOTES
Yesterday had swelling to upper lip. Saw PCP and stopped Lisinopril. States was not given any other medication for the swelling and the swelling is worse today.

## 2019-09-20 NOTE — DISCHARGE INSTRUCTIONS
Thank you for visiting our emergency department today. We all do hope that we were able to assist you in your emergent needs today. Please read over your discharge instructions as these contain pertinent information to help you in the healing process. These instructions include a list of prescriptions you were given today. Follow-up information is also noted on your discharge papers. There are attached instructions and information pertaining to the reason why you were seen in the emergency department today. These discharge instructions may not be for exactly why you were here, but may be the closest available instructions that we have. These include important advice for things that you can do at home to feel better, and reasons to return to the emergency department. The evaluation and treatment you received in the emergency department is not always definitive care. If follow-up with your primary care doctor or specialist was recommended, it is important that you make these appointments for follow-up care. You may need further testing, procedures, and/or medications to help you feel better. Further tests may be required that are not available in the emergency department. Failure to make these follow-up appointments may jeopardize your health. The emergency department is here for emergent stabilization and evaluation of life and limb threatening illness and/or injuries. Further care through a specialist or primary care doctor may be required to assist in your healing. You may continue to take Benadryl 25 to 50 mg every 8 hours at home. Also take Pepcid 20 mg every 12 hours. Continue these medications until swelling has resolved for greater than 24 hours.

## 2021-11-16 ENCOUNTER — HOSPITAL ENCOUNTER (OUTPATIENT)
Dept: CARDIAC REHAB | Age: 86
Discharge: HOME | End: 2021-11-16

## 2021-11-16 VITALS — WEIGHT: 279.54 LBS | BODY MASS INDEX: 44.93 KG/M2 | HEIGHT: 66 IN

## 2021-11-16 PROCEDURE — 93797 PHYS/QHP OP CAR RHAB WO ECG: CPT

## 2021-11-16 PROCEDURE — 93798 PHYS/QHP OP CAR RHAB W/ECG: CPT

## 2021-11-16 NOTE — CARDIO/PULMONARY
Elena Benson  80 y.o. presented to cardiac wellness for orientation and exercise tolerance test today to participate in the [address] was referred by Dr. Júnior Landeros to participate in the program in order to help him lose weight and increase activity level. He has not had any recent events precipitating this. His EF is 50% (based on Echo in 2017). Elena Benson has a cardiac history of MI and CABG in 2000, PCI in 2017, and chronic systolic and diastolic HF. Cardiac risk factors include family history, dyslipidemia, diabetes mellitus, obesity, hypertension, prior MI and these were reviewed with Amrik Yanes. Other medical history includes DM, sleep apnea, colectomy, prostate cancer and prostatectomy. Elena Benson lives with his wife. She has fallen twice at home recently and he is her primary caretaker. They have two daughters and grandchildren who are very involved in their lives and helpful with their daily needs. His PHQ9 depression score is 3 and this is considered mild. Patient had SOB during his 6 minute ETT on the [employer] and was in NSR without ectopy. Elena Benson will exercise 2 times weekly in cardiac wellness. An education manual was given to the patient and reviewed briefly.   Sarah Ruffin, RN, BSN  11/16/2021

## 2021-11-19 ENCOUNTER — HOSPITAL ENCOUNTER (OUTPATIENT)
Dept: CARDIAC REHAB | Age: 86
Discharge: HOME | End: 2021-11-19

## 2021-11-19 VITALS — BODY MASS INDEX: 45.42 KG/M2 | WEIGHT: 281.4 LBS

## 2021-11-22 ENCOUNTER — HOSPITAL ENCOUNTER (OUTPATIENT)
Dept: CARDIAC REHAB | Age: 86
Discharge: HOME | End: 2021-11-22

## 2021-11-22 VITALS — BODY MASS INDEX: 45.69 KG/M2 | WEIGHT: 283.1 LBS

## 2021-11-29 ENCOUNTER — HOSPITAL ENCOUNTER (OUTPATIENT)
Dept: CARDIAC REHAB | Age: 86
Discharge: HOME | End: 2021-11-29

## 2021-11-29 VITALS — BODY MASS INDEX: 45.19 KG/M2 | WEIGHT: 280 LBS

## 2021-11-29 PROCEDURE — 93798 PHYS/QHP OP CAR RHAB W/ECG: CPT

## 2021-12-03 ENCOUNTER — APPOINTMENT (OUTPATIENT)
Dept: CARDIAC REHAB | Age: 86
End: 2021-12-03

## 2021-12-06 ENCOUNTER — HOSPITAL ENCOUNTER (OUTPATIENT)
Dept: CARDIAC REHAB | Age: 86
Discharge: HOME | End: 2021-12-06

## 2021-12-06 VITALS — BODY MASS INDEX: 44.87 KG/M2 | WEIGHT: 278 LBS

## 2021-12-10 ENCOUNTER — APPOINTMENT (OUTPATIENT)
Dept: CARDIAC REHAB | Age: 86
End: 2021-12-10

## 2021-12-13 ENCOUNTER — HOSPITAL ENCOUNTER (OUTPATIENT)
Dept: CARDIAC REHAB | Age: 86
Discharge: HOME | End: 2021-12-13

## 2021-12-13 VITALS — WEIGHT: 280 LBS | BODY MASS INDEX: 45.19 KG/M2

## 2021-12-17 ENCOUNTER — HOSPITAL ENCOUNTER (OUTPATIENT)
Dept: CARDIAC REHAB | Age: 86
Discharge: HOME | End: 2021-12-17

## 2021-12-17 VITALS — BODY MASS INDEX: 44.55 KG/M2 | WEIGHT: 276 LBS

## 2021-12-17 NOTE — CARDIO/PULMONARY
Praveen Matt has finished his first month of participation at cardiac rehab in our [facility] program.  He will transfer to [facility] cardiac rehab location for next month's participation since that is closer to his home. His records have been faxed to [facility] and they are aware of his transfer. Mr. Eladio Case BP is consistently elevated when he is here. He states it is not elevated at home and has followed up with Dr. Abhijeet Hernandez regarding his HTN. Dr. Andrea Augustin nurse called here on 12/13/21 to verify his medications and blood pressure readings. They will continue to work with Mr. Eladio Case to lower his BP.       Thao Barajas RN

## 2021-12-22 ENCOUNTER — TRANSCRIBE ORDER (OUTPATIENT)
Dept: SCHEDULING | Age: 86
End: 2021-12-22

## 2021-12-22 DIAGNOSIS — D47.2 MONOCLONAL GAMMOPATHY: Primary | ICD-10-CM

## 2022-01-11 ENCOUNTER — HOSPITAL ENCOUNTER (OUTPATIENT)
Dept: GENERAL RADIOLOGY | Age: 87
Discharge: HOME | End: 2022-01-11
Attending: INTERNAL MEDICINE
Payer: MEDICARE

## 2022-01-11 DIAGNOSIS — D47.2 MONOCLONAL GAMMOPATHY: ICD-10-CM

## 2022-01-11 PROCEDURE — 77075 RADEX OSSEOUS SURVEY COMPL: CPT

## 2022-03-18 PROBLEM — I10 UNCONTROLLED HYPERTENSION: Status: ACTIVE | Noted: 2019-05-24

## 2022-03-18 PROBLEM — K85.90 ACUTE PANCREATITIS: Status: ACTIVE | Noted: 2019-05-24

## 2022-03-19 PROBLEM — I20.0 UNSTABLE ANGINA (HCC): Status: ACTIVE | Noted: 2017-08-19

## 2022-03-19 PROBLEM — R07.9 CHEST PAIN: Status: ACTIVE | Noted: 2019-05-24

## 2022-03-19 PROBLEM — K85.90 PANCREATITIS: Status: ACTIVE | Noted: 2019-05-24

## 2022-03-20 PROBLEM — E66.01 MORBID OBESITY (HCC): Status: ACTIVE | Noted: 2019-05-24

## 2022-06-27 ENCOUNTER — APPOINTMENT (OUTPATIENT)
Dept: GENERAL RADIOLOGY | Age: 87
DRG: 305 | End: 2022-06-27
Attending: EMERGENCY MEDICINE
Payer: MEDICARE

## 2022-06-27 ENCOUNTER — APPOINTMENT (OUTPATIENT)
Dept: CT IMAGING | Age: 87
DRG: 305 | End: 2022-06-27
Attending: FAMILY MEDICINE
Payer: MEDICARE

## 2022-06-27 ENCOUNTER — HOSPITAL ENCOUNTER (INPATIENT)
Age: 87
LOS: 3 days | Discharge: HOME HEALTH | DRG: 305 | End: 2022-06-30
Attending: EMERGENCY MEDICINE | Admitting: FAMILY MEDICINE
Payer: MEDICARE

## 2022-06-27 ENCOUNTER — APPOINTMENT (OUTPATIENT)
Dept: CT IMAGING | Age: 87
DRG: 305 | End: 2022-06-27
Attending: EMERGENCY MEDICINE
Payer: MEDICARE

## 2022-06-27 DIAGNOSIS — I10 HYPERTENSION, UNSPECIFIED TYPE: ICD-10-CM

## 2022-06-27 DIAGNOSIS — R07.9 CHEST PAIN, UNSPECIFIED TYPE: Primary | ICD-10-CM

## 2022-06-27 PROBLEM — I16.0 HYPERTENSIVE URGENCY: Status: ACTIVE | Noted: 2022-06-27

## 2022-06-27 LAB
ALBUMIN SERPL-MCNC: 3.7 G/DL (ref 3.5–5)
ALBUMIN/GLOB SERPL: 1.1 {RATIO} (ref 1.1–2.2)
ALP SERPL-CCNC: 70 U/L (ref 45–117)
ALT SERPL-CCNC: 13 U/L (ref 12–78)
ANION GAP SERPL CALC-SCNC: 5 MMOL/L (ref 5–15)
AST SERPL-CCNC: 15 U/L (ref 15–37)
ATRIAL RATE: 47 BPM
ATRIAL RATE: 59 BPM
BASOPHILS # BLD: 0 K/UL (ref 0–0.1)
BASOPHILS NFR BLD: 0 % (ref 0–1)
BILIRUB SERPL-MCNC: 0.7 MG/DL (ref 0.2–1)
BNP SERPL-MCNC: 501 PG/ML
BUN SERPL-MCNC: 26 MG/DL (ref 6–20)
BUN/CREAT SERPL: 14 (ref 12–20)
CALCIUM SERPL-MCNC: 9.7 MG/DL (ref 8.5–10.1)
CALCULATED P AXIS, ECG09: 58 DEGREES
CALCULATED P AXIS, ECG09: 77 DEGREES
CALCULATED R AXIS, ECG10: 66 DEGREES
CALCULATED R AXIS, ECG10: 74 DEGREES
CALCULATED T AXIS, ECG11: 117 DEGREES
CALCULATED T AXIS, ECG11: 132 DEGREES
CHLORIDE SERPL-SCNC: 106 MMOL/L (ref 97–108)
CO2 SERPL-SCNC: 31 MMOL/L (ref 21–32)
COMMENT, HOLDF: NORMAL
CREAT SERPL-MCNC: 1.89 MG/DL (ref 0.7–1.3)
DIAGNOSIS, 93000: NORMAL
DIAGNOSIS, 93000: NORMAL
DIFFERENTIAL METHOD BLD: ABNORMAL
EOSINOPHIL # BLD: 0.1 K/UL (ref 0–0.4)
EOSINOPHIL NFR BLD: 2 % (ref 0–7)
ERYTHROCYTE [DISTWIDTH] IN BLOOD BY AUTOMATED COUNT: 15.9 % (ref 11.5–14.5)
EST. AVERAGE GLUCOSE BLD GHB EST-MCNC: 126 MG/DL
GLOBULIN SER CALC-MCNC: 3.3 G/DL (ref 2–4)
GLUCOSE BLD STRIP.AUTO-MCNC: 116 MG/DL (ref 65–117)
GLUCOSE SERPL-MCNC: 83 MG/DL (ref 65–100)
HBA1C MFR BLD: 6 % (ref 4–5.6)
HCT VFR BLD AUTO: 42.6 % (ref 36.6–50.3)
HGB BLD-MCNC: 13.6 G/DL (ref 12.1–17)
IMM GRANULOCYTES # BLD AUTO: 0 K/UL (ref 0–0.04)
IMM GRANULOCYTES NFR BLD AUTO: 0 % (ref 0–0.5)
LIPASE SERPL-CCNC: 94 U/L (ref 73–393)
LYMPHOCYTES # BLD: 1.4 K/UL (ref 0.8–3.5)
LYMPHOCYTES NFR BLD: 33 % (ref 12–49)
MAGNESIUM SERPL-MCNC: 2 MG/DL (ref 1.6–2.4)
MCH RBC QN AUTO: 27.9 PG (ref 26–34)
MCHC RBC AUTO-ENTMCNC: 31.9 G/DL (ref 30–36.5)
MCV RBC AUTO: 87.3 FL (ref 80–99)
MONOCYTES # BLD: 0.5 K/UL (ref 0–1)
MONOCYTES NFR BLD: 12 % (ref 5–13)
NEUTS SEG # BLD: 2.2 K/UL (ref 1.8–8)
NEUTS SEG NFR BLD: 53 % (ref 32–75)
NRBC # BLD: 0 K/UL (ref 0–0.01)
NRBC BLD-RTO: 0 PER 100 WBC
P-R INTERVAL, ECG05: 150 MS
P-R INTERVAL, ECG05: 184 MS
PLATELET # BLD AUTO: 171 K/UL (ref 150–400)
PMV BLD AUTO: 11.8 FL (ref 8.9–12.9)
POTASSIUM SERPL-SCNC: 4.8 MMOL/L (ref 3.5–5.1)
PROT SERPL-MCNC: 7 G/DL (ref 6.4–8.2)
Q-T INTERVAL, ECG07: 420 MS
Q-T INTERVAL, ECG07: 440 MS
QRS DURATION, ECG06: 76 MS
QRS DURATION, ECG06: 84 MS
QTC CALCULATION (BEZET), ECG08: 389 MS
QTC CALCULATION (BEZET), ECG08: 415 MS
RBC # BLD AUTO: 4.88 M/UL (ref 4.1–5.7)
SAMPLES BEING HELD,HOLD: NORMAL
SERVICE CMNT-IMP: NORMAL
SODIUM SERPL-SCNC: 142 MMOL/L (ref 136–145)
TROPONIN-HIGH SENSITIVITY: 17 NG/L (ref 0–76)
TROPONIN-HIGH SENSITIVITY: 9 NG/L (ref 0–76)
TROPONIN-HIGH SENSITIVITY: 9 NG/L (ref 0–76)
VENTRICULAR RATE, ECG03: 47 BPM
VENTRICULAR RATE, ECG03: 59 BPM
WBC # BLD AUTO: 4.2 K/UL (ref 4.1–11.1)

## 2022-06-27 PROCEDURE — 65660000001 HC RM ICU INTERMED STEPDOWN

## 2022-06-27 PROCEDURE — 74011250637 HC RX REV CODE- 250/637: Performed by: INTERNAL MEDICINE

## 2022-06-27 PROCEDURE — 84484 ASSAY OF TROPONIN QUANT: CPT

## 2022-06-27 PROCEDURE — 80053 COMPREHEN METABOLIC PANEL: CPT

## 2022-06-27 PROCEDURE — 74011250636 HC RX REV CODE- 250/636: Performed by: FAMILY MEDICINE

## 2022-06-27 PROCEDURE — 83735 ASSAY OF MAGNESIUM: CPT

## 2022-06-27 PROCEDURE — 74011250636 HC RX REV CODE- 250/636: Performed by: INTERNAL MEDICINE

## 2022-06-27 PROCEDURE — 36415 COLL VENOUS BLD VENIPUNCTURE: CPT

## 2022-06-27 PROCEDURE — 83036 HEMOGLOBIN GLYCOSYLATED A1C: CPT

## 2022-06-27 PROCEDURE — 94761 N-INVAS EAR/PLS OXIMETRY MLT: CPT

## 2022-06-27 PROCEDURE — 96374 THER/PROPH/DIAG INJ IV PUSH: CPT

## 2022-06-27 PROCEDURE — 70450 CT HEAD/BRAIN W/O DYE: CPT

## 2022-06-27 PROCEDURE — 83880 ASSAY OF NATRIURETIC PEPTIDE: CPT

## 2022-06-27 PROCEDURE — 74011250637 HC RX REV CODE- 250/637: Performed by: FAMILY MEDICINE

## 2022-06-27 PROCEDURE — 93005 ELECTROCARDIOGRAM TRACING: CPT

## 2022-06-27 PROCEDURE — 74011000250 HC RX REV CODE- 250: Performed by: FAMILY MEDICINE

## 2022-06-27 PROCEDURE — 71045 X-RAY EXAM CHEST 1 VIEW: CPT

## 2022-06-27 PROCEDURE — 82962 GLUCOSE BLOOD TEST: CPT

## 2022-06-27 PROCEDURE — 94760 N-INVAS EAR/PLS OXIMETRY 1: CPT

## 2022-06-27 PROCEDURE — 71250 CT THORAX DX C-: CPT

## 2022-06-27 PROCEDURE — 74011250636 HC RX REV CODE- 250/636: Performed by: EMERGENCY MEDICINE

## 2022-06-27 PROCEDURE — 99285 EMERGENCY DEPT VISIT HI MDM: CPT

## 2022-06-27 PROCEDURE — 83690 ASSAY OF LIPASE: CPT

## 2022-06-27 PROCEDURE — 85025 COMPLETE CBC W/AUTO DIFF WBC: CPT

## 2022-06-27 RX ORDER — HYDRALAZINE HYDROCHLORIDE 50 MG/1
50 TABLET, FILM COATED ORAL 3 TIMES DAILY
Status: DISCONTINUED | OUTPATIENT
Start: 2022-06-27 | End: 2022-06-28

## 2022-06-27 RX ORDER — MAGNESIUM SULFATE 100 %
4 CRYSTALS MISCELLANEOUS AS NEEDED
Status: DISCONTINUED | OUTPATIENT
Start: 2022-06-27 | End: 2022-06-30 | Stop reason: HOSPADM

## 2022-06-27 RX ORDER — ASPIRIN 81 MG/1
81 TABLET ORAL DAILY
Status: DISCONTINUED | OUTPATIENT
Start: 2022-06-28 | End: 2022-06-30 | Stop reason: HOSPADM

## 2022-06-27 RX ORDER — INSULIN LISPRO 100 [IU]/ML
INJECTION, SOLUTION INTRAVENOUS; SUBCUTANEOUS
Status: DISCONTINUED | OUTPATIENT
Start: 2022-06-27 | End: 2022-06-30 | Stop reason: HOSPADM

## 2022-06-27 RX ORDER — SODIUM CHLORIDE 0.9 % (FLUSH) 0.9 %
5-40 SYRINGE (ML) INJECTION EVERY 8 HOURS
Status: DISCONTINUED | OUTPATIENT
Start: 2022-06-27 | End: 2022-06-30 | Stop reason: HOSPADM

## 2022-06-27 RX ORDER — NITROGLYCERIN 0.4 MG/1
0.4 TABLET SUBLINGUAL
Status: DISCONTINUED | OUTPATIENT
Start: 2022-06-27 | End: 2022-06-30 | Stop reason: HOSPADM

## 2022-06-27 RX ORDER — SODIUM CHLORIDE 9 MG/ML
75 INJECTION, SOLUTION INTRAVENOUS CONTINUOUS
Status: DISCONTINUED | OUTPATIENT
Start: 2022-06-27 | End: 2022-06-28

## 2022-06-27 RX ORDER — AMLODIPINE BESYLATE 5 MG/1
10 TABLET ORAL DAILY
Status: DISCONTINUED | OUTPATIENT
Start: 2022-06-28 | End: 2022-06-28

## 2022-06-27 RX ORDER — HYDRALAZINE HYDROCHLORIDE 20 MG/ML
10 INJECTION INTRAMUSCULAR; INTRAVENOUS ONCE
Status: COMPLETED | OUTPATIENT
Start: 2022-06-27 | End: 2022-06-27

## 2022-06-27 RX ORDER — CLOPIDOGREL BISULFATE 75 MG/1
75 TABLET ORAL DAILY
Status: DISCONTINUED | OUTPATIENT
Start: 2022-06-28 | End: 2022-06-30 | Stop reason: HOSPADM

## 2022-06-27 RX ORDER — METOPROLOL TARTRATE 25 MG/1
12.5 TABLET, FILM COATED ORAL DAILY
Status: DISCONTINUED | OUTPATIENT
Start: 2022-06-28 | End: 2022-06-28

## 2022-06-27 RX ORDER — SODIUM CHLORIDE 0.9 % (FLUSH) 0.9 %
5-40 SYRINGE (ML) INJECTION AS NEEDED
Status: DISCONTINUED | OUTPATIENT
Start: 2022-06-27 | End: 2022-06-30 | Stop reason: HOSPADM

## 2022-06-27 RX ORDER — HYDRALAZINE HYDROCHLORIDE 20 MG/ML
10 INJECTION INTRAMUSCULAR; INTRAVENOUS
Status: DISCONTINUED | OUTPATIENT
Start: 2022-06-27 | End: 2022-06-30 | Stop reason: HOSPADM

## 2022-06-27 RX ORDER — PRAVASTATIN SODIUM 20 MG/1
20 TABLET ORAL
Status: DISCONTINUED | OUTPATIENT
Start: 2022-06-27 | End: 2022-06-30 | Stop reason: HOSPADM

## 2022-06-27 RX ORDER — ASCORBIC ACID 500 MG
500 TABLET ORAL DAILY
Status: DISCONTINUED | OUTPATIENT
Start: 2022-06-28 | End: 2022-06-30 | Stop reason: HOSPADM

## 2022-06-27 RX ORDER — ACETAMINOPHEN 325 MG/1
650 TABLET ORAL
Status: DISCONTINUED | OUTPATIENT
Start: 2022-06-27 | End: 2022-06-30 | Stop reason: HOSPADM

## 2022-06-27 RX ADMIN — HYDRALAZINE HYDROCHLORIDE 10 MG: 20 INJECTION, SOLUTION INTRAMUSCULAR; INTRAVENOUS at 14:06

## 2022-06-27 RX ADMIN — HYDRALAZINE HYDROCHLORIDE 50 MG: 50 TABLET, FILM COATED ORAL at 21:42

## 2022-06-27 RX ADMIN — SODIUM CHLORIDE, PRESERVATIVE FREE 10 ML: 5 INJECTION INTRAVENOUS at 21:43

## 2022-06-27 RX ADMIN — PRAVASTATIN SODIUM 20 MG: 20 TABLET ORAL at 21:42

## 2022-06-27 RX ADMIN — NICARDIPINE HYDROCHLORIDE 2.5 MG/HR: 25 INJECTION, SOLUTION INTRAVENOUS at 20:00

## 2022-06-27 RX ADMIN — HYDRALAZINE HYDROCHLORIDE 10 MG: 20 INJECTION INTRAMUSCULAR; INTRAVENOUS at 18:38

## 2022-06-27 NOTE — CONSULTS
Cardiology Note dictated # [id number]  Imp:  Atypical CP with normal enzymes  Accelerated HTN  CAD: remote CABG and most recently JENNY to RCA in 2017  Morbid obesity  CKD with current GFR 41; Cr 1.89  NAOMIE wears CPAP every night  Recommendations:  Admit to the hospitalist service for observation  Chest CT WO to assess for ascending aorta enlargement  IV hydralazine to bring down his BP  Further recommendations to follow  Thank you for this referral  Reina Haywood MD  Interventional Cardiology  S

## 2022-06-27 NOTE — H&P
[address] [facility] Adult  Hospitalist Group  History and Physical    Date of Service:  6/27/2022  Primary Care Provider: Ze Morales MD  Source of information: The patient and Chart review    Chief Complaint: Chest Pain      History of Presenting Illness:   Shiva Olivarez is a 80 y.o. male who presents with chest pain    History was probably obtained from the patient as well as his family member present at the bedside    Patient reports that he started having some chest pain that started yesterday, patient reports that the chest pain does not radiate, reports that it is dull substernal, denies any nausea or vomiting associated with this chest pain, patient reports that he has history of coronary artery disease and has had multiple stents, patient reports that he uses CPAP at night and has history of obstructive sleep apnea, reports that the chest pain did not nereida today he got concerned and decided to come to the hospital, patient also complains of frontal headache, denies any focal neurological deficit with that    The patient denies any headache, blurry vision, sore throat, trouble swallowing, trouble with speech, , cough, fever, chills, N/V/D, abd pain, urinary symptoms, constipation, recent travels, sick contacts, focal or generalized neurological symptoms, falls, injuries, rashes, contact with COVID-19 diagnosed patients, hematemesis, melena, hemoptysis, hematuria, rashes, denies starting any new medications and denies any other concerns or problems besides as mentioned above. REVIEW OF SYSTEMS:  A comprehensive review of systems was negative except for that written in the History of Present Illness.      Past Medical History:   Diagnosis Date    CAD (coronary artery disease) 08/21/2017    PCI and stent    Cancer [facility])     Prostate CA    [facility] [address] [facility]     Hypertension     Myocardial infarct ([facility] [address] [facility] 2000    Sleep apnea     wears C-PAP    Sleep disorder     Sleep Apnea C-PAP use at home       Past Surgical History:   Procedure Laterality Date    HX CORONARY STENT PLACEMENT      HX PROSTATECTOMY      HX TOTAL COLECTOMY  2012    [place] CARDIAC SURG PROCEDURE UNLIST  2000    CABG     Prior to Admission medications    Medication Sig Start Date End Date Taking? Authorizing Provider   methocarbamol (ROBAXIN) 500 mg tablet Take 1 Tab by mouth four (4) times daily. 7/8/19   Mallory Almanzar MD   ascorbic acid, vitamin C, (VITAMIN C) 500 mg tablet Take 500 mg by mouth daily. Provider, Historical   clopidogrel (PLAVIX) 75 mg tab Take 1 Tab by mouth daily. Indications: Thrombosis Prevention after PCI 8/24/17   Maricarmen Duggan MD   metoprolol tartrate (LOPRESSOR) 25 mg tablet Take 0.5 Tabs by mouth daily. 8/24/17   Maricarmen Duggan MD   docusate sodium (COLACE) 100 mg capsule Take 100 mg by mouth nightly as needed for Constipation. Provider, Historical   VITAMIN E/QUININE SULFATE (LEG CRAMP TREATMENT PO) Take 1 Cap by mouth nightly. Filiberto's leg cramps    Provider, Historical   aspirin delayed-release 81 mg tablet Take 1 Tab by mouth daily. 7/14/15   Maricarmen Duggan MD   pravastatin (PRAVACHOL) 20 mg tablet Take 20 mg by mouth nightly. Provider, Historical   amLODIPine (NORVASC) 10 mg tablet Take 10 mg by mouth daily. Provider, Historical   bicalutamide (CASODEX) 50 mg tablet Take 50 mg by mouth daily. Provider, Historical     Allergies   Allergen Reactions    Iodine Other (comments)     \"it gives me a headache\"      Family History   Problem Relation Age of Onset    Heart Disease Sister       Social History:  reports that he has never smoked. He has never used smokeless tobacco. He reports current drug use. He reports that he does not drink alcohol. Family and social history were personally reviewed, all pertinent and relevant details are outlined as above.     Objective:     Visit Vitals  BP (!) 162/98   Pulse 65   Temp 97.2 °F (36.2 °C)   Resp 16   Ht 5' 7\" (1.702 m)   Wt 117.9 kg (260 lb)   SpO2 95%   BMI 40.72 kg/m²      O2 Device: None (Room air)    PHYSICAL EXAM:   General: Alert x oriented x 3, awake,   HEENT: PEERL, EOMI, moist mucus membranes  Neck: Supple, no JVD, no meningeal signs  Chest: Clear to auscultation bilaterally   CVS: RRR, S1 S2 heard, no murmurs/rubs/gallops  Abd: Soft, non-tender, non-distended, +bowel sounds   Ext: No clubbing, no cyanosis, no edema  Neuro/Psych: Pleasant mood and affect, CN 2-12 grossly intact, sensory grossly within normal limit, Strength 5/5 in all extremities, DTR 1+ x 4  Cap refill: Brisk, less than 3 seconds  Pulses: 2+, symmetric in all extremities  Skin: Warm, dry, without rashes or lesions    Data Review: All diagnostic labs and studies have been reviewed. Abnormal Labs Reviewed   METABOLIC PANEL, COMPREHENSIVE - Abnormal; Notable for the following components:       Result Value    BUN 26 (*)     Creatinine 1.89 (*)     GFR est AA 41 (*)     GFR est non-AA 34 (*)     All other components within normal limits   CBC WITH AUTOMATED DIFF - Abnormal; Notable for the following components:    RDW 15.9 (*)     All other components within normal limits   NT-PRO BNP - Abnormal; Notable for the following components:    NT pro- (*)     All other components within normal limits       All Micro Results     None          IMAGING:   XR CHEST PORT   Final Result   Cardiomegaly. Hyperinflation. No acute infiltrate.       CT CHEST WO CONT    (Results Pending)        ECG/ECHO:    Results for orders placed or performed during the hospital encounter of 06/27/22   EKG, 12 LEAD, INITIAL   Result Value Ref Range    Ventricular Rate 47 BPM    Atrial Rate 47 BPM    P-R Interval 150 ms    QRS Duration 76 ms    Q-T Interval 440 ms    QTC Calculation (Bezet) 389 ms    Calculated P Axis 58 degrees    Calculated R Axis 66 degrees    Calculated T Axis 132 degrees    Diagnosis       Sinus bradycardia  Cannot rule out Anterior infarct , age undetermined  Abnormal ECG  When compared with ECG of 24-MAY-2019 03:11,  premature atrial complexes are no longer present  Vent. rate has decreased BY  25 BPM  Minimal criteria for Anterior infarct are now present  T wave inversion less evident in Anterior leads  T wave inversion more evident in Lateral leads  QT has shortened  Confirmed by Joe Tai ([id number]) on 6/27/2022 12:34:14 PM          Assessment:   Given the patient's current clinical presentation, there is a high level of concern for decompensation if discharged from the emergency department. Complex decision making was performed, which includes reviewing the patient's available past medical records, laboratory results, and imaging studies. Chest pain  Hypertensive urgency  KOJO on CKD  Diabetes mellitus type 2    Plan:   Admit patient on telemetry, cycle troponins, continue aspirin and Plavix, CT chest pending, telemetry, pain control, optimizing blood pressure and further intervention per hospital course  Start patient on low-dose nicardipine to optimize blood pressure control, patient with a headache, will get a CT of the head to rule out any acute pathology, continue home medications, close monitoring and further intervention per hospital course, echo and troponins have been ordered  Likely prerenal gentle IV hydration, repeat labs in the morning, avoid nephrotoxic medication, renally dose all medication, trend creatinine and further intervention per hospital course, may consider nephrology consult if persist may also consider getting a renal ultrasound if symptoms/creatinine elevation persist  Sliding-scale insulin, Accu-Cheks, diet control, close monitoring        DIET: ADULT DIET Regular;  Low Sodium (2 gm)   ISOLATION PRECAUTIONS: There are currently no Active Isolations  CODE STATUS: Prior   DVT PROPHYLAXIS: SCDs  FUNCTIONAL STATUS PRIOR TO HOSPITALIZATION: Ambulatory and capable of self-care but relies on assistive devices (rolling walker/cane). EARLY MOBILITY ASSESSMENT: Recommend a consult to the Mobility Team  ANTICIPATED DISCHARGE: 24-48 hours. CRITICAL CARE WAS PERFORMED FOR THIS ENCOUNTER: YES. I had a face to face encounter with the patient, reviewed and interpreted patient data including clinical events, labs, images, vital signs, I/O's, and examined patient. I have discussed the case and the plan and management of the patient's care with the consulting services, the bedside nurses and necessary ancillary providers. This patient has a high probability of imminent, clinically significant deterioration, which requires the highest level of preparedness to intervene urgently. I participated in the decision-making and personally managed or directed the management of the following life and organ supporting interventions that required my frequent assessment to treat or prevent imminent deterioration. I personally spent 40 minutes of critical care time. This is time spent at this critically ill patient's bedside actively involved in patient care as well as the coordination of care and discussions with the patient's family. This does not include any procedural time which has been billed separately. Signed By: Fior Enrique MD     June 27, 2022         Please note that this dictation may have been completed with Dragon, the [employer] voice recognition software. Quite often unanticipated grammatical, syntax, homophones, and other interpretive errors are inadvertently transcribed by the computer software. Please disregard these errors. Please excuse any errors that have escaped final proofreading.

## 2022-06-27 NOTE — ED PROVIDER NOTES
Patient is here for chest pain. He states that beginning yesterday he has had gradual onset of intermittent substernal chest pain. Not associated with diaphoresis nausea vomiting radiation or shortness of breath. Has a cardiac history significant for coronary disease status post PCI and stent. Does have hypertension and diabetes as well. Does have a history of myocardial infarction in the past.  Does have sleep apnea and uses BiPAP at night. Currently denies any complaints. Has not noticed any new leg pain or leg swelling. No history of blood clots. No fever chills or other symptoms. Denies alcohol tobacco or illegal drugs. Past Medical History:   Diagnosis Date    CAD (coronary artery disease) 08/21/2017    PCI and stent    Cancer ([facility] [address]     Prostate CA    Diabetes ([facility] [address]     Hypertension     Myocardial infarct ([facility] [address] 2000    Sleep apnea     wears C-PAP    Sleep disorder     Sleep Apnea C-PAP use at home        Past Surgical History:   Procedure Laterality Date    HX CORONARY STENT PLACEMENT      HX PROSTATECTOMY      HX TOTAL COLECTOMY  2012    [place] CARDIAC SURG PROCEDURE UNLIST  2000    CABG         Family History:   Problem Relation Age of Onset    Heart Disease Sister        Social History     Socioeconomic History    Marital status:      Spouse name: Not on file    Number of children: Not on file    Years of education: Not on file    Highest education level: Not on file   Occupational History    Not on file   Tobacco Use    Smoking status: Never Smoker    Smokeless tobacco: Never Used   Substance and Sexual Activity    Alcohol use: No    Drug use:  Yes    Sexual activity: Not on file   Other Topics Concern     Service Not Asked    Blood Transfusions Not Asked    Caffeine Concern Not Asked    Occupational Exposure Not Asked    Hobby Hazards Not Asked    Sleep Concern Not Asked    Stress Concern Not Asked    Weight Concern Not Asked    Special Diet Not Asked    Back Care Not Asked    Exercise Not Asked    Bike Helmet Not Asked   [address] Not Asked    Self-Exams Not Asked   Social History Narrative    Not on file     Social Determinants of Health     Financial Resource Strain:     Difficulty of Paying Living Expenses: Not on file   Food Insecurity:     Worried About Running Out of Food in the Last Year: Not on file    Anastasiia of Food in the Last Year: Not on file   Transportation Needs:     Lack of Transportation (Medical): Not on file    Lack of Transportation (Non-Medical): Not on file   Physical Activity:     Days of Exercise per Week: Not on file    Minutes of Exercise per Session: Not on file   Stress:     Feeling of Stress : Not on file   Social Connections:     Frequency of Communication with Friends and Family: Not on file    Frequency of Social Gatherings with Friends and Family: Not on file    Attends Rastafari Services: Not on file    Active Member of [address] [place] [address] or Organizations: Not on file    Attends Club or Organization Meetings: Not on file    Marital Status: Not on file   Intimate Partner Violence:     Fear of Current or Ex-Partner: Not on file    Emotionally Abused: Not on file    Physically Abused: Not on file    Sexually Abused: Not on file   Housing Stability:     Unable to Pay for Housing in the Last Year: Not on file    Number of [place] in the Last Year: Not on file    Unstable Housing in the Last Year: Not on file         ALLERGIES: Iodine    Review of Systems   Constitutional: Negative for chills, diaphoresis, fatigue and fever. HENT: Negative for congestion, rhinorrhea and sore throat. Eyes: Negative for discharge and itching. Respiratory: Positive for chest tightness. Negative for choking and shortness of breath. Cardiovascular: Positive for chest pain. Negative for palpitations and leg swelling. Gastrointestinal: Negative for abdominal pain, constipation, diarrhea, nausea and vomiting. Genitourinary: Negative for dysuria, flank pain and hematuria. Musculoskeletal: Negative for arthralgias, back pain, gait problem, joint swelling, myalgias, neck pain and neck stiffness. Skin: Negative for color change, pallor, rash and wound. Neurological: Negative for dizziness, tremors, syncope, light-headedness, numbness and headaches. Vitals:    06/27/22 1322 06/27/22 1400 06/27/22 1406 06/27/22 1415   BP: (!) 228/83 (!) 220/83 (!) 202/82 (!) 192/73   Pulse: (!) 45 (!) 44 (!) 44 (!) 54   Resp: 21 21  21   Temp:       SpO2: 95% 95%  95%   Weight:       Height:                Physical Exam  Vitals and nursing note reviewed. Constitutional:       General: He is not in acute distress. Appearance: He is well-developed. He is obese. He is not ill-appearing, toxic-appearing or diaphoretic. HENT:      Head: Normocephalic and atraumatic. Eyes:      Extraocular Movements: Extraocular movements intact. Pupils: Pupils are equal, round, and reactive to light. Neck:      Thyroid: No thyromegaly. Vascular: No JVD. Trachea: No tracheal deviation. Cardiovascular:      Rate and Rhythm: Normal rate and regular rhythm. Heart sounds: Normal heart sounds. Pulmonary:      Effort: Pulmonary effort is normal. No tachypnea, accessory muscle usage or respiratory distress. Breath sounds: Normal breath sounds. No stridor. Chest:      Chest wall: No mass, deformity, tenderness, crepitus or edema. There is no dullness to percussion. Abdominal:      General: Bowel sounds are normal.      Palpations: Abdomen is soft. Musculoskeletal:         General: Normal range of motion. Cervical back: Normal range of motion and neck supple. Right lower leg: No tenderness. Edema present. Left lower leg: No tenderness. Edema present. Comments: Trace edema in the lower extremities bilaterally. Negative Homans' sign bilaterally.    Lymphadenopathy:      Cervical: No cervical adenopathy. Skin:     General: Skin is warm and dry. Capillary Refill: Capillary refill takes less than 2 seconds. Coloration: Skin is not cyanotic or pale. Findings: No ecchymosis, erythema or rash. Nails: There is no clubbing. Neurological:      General: No focal deficit present. Mental Status: He is alert and oriented to person, place, and time. Motor: No weakness. Psychiatric:         Mood and Affect: Mood normal.         Behavior: Behavior normal.          [facility]  ED Course as of 06/27/22 1547   Mon Jun 27, 2022   1312 Patient examined. With hypertension bradycardia at triage but normal heart rate on my exam.  Patient no acute distress. No complaints at this time. Does have significant cardiac history. Will get EKG labs chest x-ray. Concern for possible ACS. [AF]   1400 Hydralazine ordered for HTN. SBP here >220 with HR in 40s. [AF]   1427 XR CHEST PORT [AF]   1656 TROPONIN-HIGH SENSITIVITY:    Troponin-High Sensitivity 9 [AF]   1446 COMPREHENSIVE METABOLIC PANEL(!):    Sodium 142   Potassium 4.8   Chloride 106   CO2 31   Anion gap 5   Glucose 83   BUN 26(!)   Creatinine 1.89(!)   BUN/Creatinine ratio 14   GFR est AA 41(!)   GFR est non-AA 34(!)   Calcium 9.7   Bilirubin, total 0.7   ALT 13   AST 15   Alk. phosphatase 70   Protein, total 7.0   Albumin 3.7   Globulin 3.3   A-G Ratio 1.1 [AF]   1446 CBC WITH AUTOMATED DIFF(!):    WBC 4.2   RBC 4.88   HGB 13.6   HCT 42.6   MCV 87.3   MCH 27.9   MCHC 31.9   RDW 15.9(!)   PLATELET 627   MPV 84.9   NRBC 0.0   ABSOLUTE NRBC 0.00   NEUTROPHILS 53   LYMPHOCYTES 33   MONOCYTES 12   EOSINOPHILS 2   BASOPHILS 0   IMMATURE GRANULOCYTES 0   ABS. NEUTROPHILS 2.2   ABS. LYMPHOCYTES 1.4   ABS. MONOCYTES 0.5   ABS. EOSINOPHILS 0.1   ABS. BASOPHILS 0.0   ABS. IMM.  GRANS. 0.0   DF AUTOMATED [AF]   1446 PRO-BNP(!):    NT pro-(!) [AF]   1446 LIPASE:    Lipase 94 [AF]   1446 MAGNESIUM:    Magnesium 2.0 [AF]   1446 Will talk to pt's cardiologist  [AF]   [phone] EKG was sinus bradycardia, rate 59, normal MA interval, normal QRS, nonspecific ST changes, normal QTC.   Cardiology has been paged. [AF]   [phone] Signed out to overnight team  [AF]      ED Course User Index  [AF] Terra Orf, DO       Procedures

## 2022-06-27 NOTE — ED NOTES
Hospitalist was consulted for admission and will see the patient in the emergency department. Perfect Serve Consult for Admission  5:14 PM    ED Room Number: ER26/26  Patient Name and age:  Grant Cutler 80 y.o.  male  Working Diagnosis:   1. Chest pain, unspecified type    2. Hypertension, unspecified type        COVID-19 Suspicion:  no  Sepsis present:  no  Reassessment needed: no  Code Status:  Full Code  Readmission: no  Isolation Requirements:  no  Recommended Level of Care:  telemetry  Department:[facility] Adult ED - 21   Other:  80 y.o. male presents with chest pain, hypertension. Leisa Estes evaluated and recommending medical admission. 5:23 PM  Spoke with Dr Leisa Estes who recommends non-contrast chest CT.

## 2022-06-27 NOTE — ED TRIAGE NOTES
Pt arrives with chest soreness that started yesterday \"felt like I was ran over by a truck\". Pt denies chest pain at this time. denies SOB, n/v, cough.

## 2022-06-28 ENCOUNTER — APPOINTMENT (OUTPATIENT)
Dept: NON INVASIVE DIAGNOSTICS | Age: 87
DRG: 305 | End: 2022-06-28
Attending: INTERNAL MEDICINE
Payer: MEDICARE

## 2022-06-28 ENCOUNTER — APPOINTMENT (OUTPATIENT)
Dept: NUCLEAR MEDICINE | Age: 87
DRG: 305 | End: 2022-06-28
Attending: INTERNAL MEDICINE
Payer: MEDICARE

## 2022-06-28 LAB
ALBUMIN SERPL-MCNC: 3.6 G/DL (ref 3.5–5)
ALBUMIN/GLOB SERPL: 0.9 {RATIO} (ref 1.1–2.2)
ALP SERPL-CCNC: 68 U/L (ref 45–117)
ALT SERPL-CCNC: 13 U/L (ref 12–78)
ANION GAP SERPL CALC-SCNC: 6 MMOL/L (ref 5–15)
AST SERPL-CCNC: 15 U/L (ref 15–37)
BASOPHILS # BLD: 0 K/UL (ref 0–0.1)
BASOPHILS NFR BLD: 0 % (ref 0–1)
BILIRUB SERPL-MCNC: 0.6 MG/DL (ref 0.2–1)
BUN SERPL-MCNC: 25 MG/DL (ref 6–20)
BUN/CREAT SERPL: 16 (ref 12–20)
CALCIUM SERPL-MCNC: 9.6 MG/DL (ref 8.5–10.1)
CHLORIDE SERPL-SCNC: 108 MMOL/L (ref 97–108)
CHOLEST SERPL-MCNC: 122 MG/DL
CO2 SERPL-SCNC: 26 MMOL/L (ref 21–32)
CREAT SERPL-MCNC: 1.52 MG/DL (ref 0.7–1.3)
DIFFERENTIAL METHOD BLD: ABNORMAL
EOSINOPHIL # BLD: 0 K/UL (ref 0–0.4)
EOSINOPHIL NFR BLD: 0 % (ref 0–7)
ERYTHROCYTE [DISTWIDTH] IN BLOOD BY AUTOMATED COUNT: 16.1 % (ref 11.5–14.5)
GLOBULIN SER CALC-MCNC: 3.8 G/DL (ref 2–4)
GLUCOSE BLD STRIP.AUTO-MCNC: 107 MG/DL (ref 65–117)
GLUCOSE BLD STRIP.AUTO-MCNC: 117 MG/DL (ref 65–117)
GLUCOSE BLD STRIP.AUTO-MCNC: 122 MG/DL (ref 65–117)
GLUCOSE BLD STRIP.AUTO-MCNC: 127 MG/DL (ref 65–117)
GLUCOSE SERPL-MCNC: 132 MG/DL (ref 65–100)
HCT VFR BLD AUTO: 43.1 % (ref 36.6–50.3)
HDLC SERPL-MCNC: 56 MG/DL
HDLC SERPL: 2.2 {RATIO} (ref 0–5)
HGB BLD-MCNC: 14.2 G/DL (ref 12.1–17)
IMM GRANULOCYTES # BLD AUTO: 0 K/UL (ref 0–0.04)
IMM GRANULOCYTES NFR BLD AUTO: 1 % (ref 0–0.5)
LDLC SERPL CALC-MCNC: 52.8 MG/DL (ref 0–100)
LYMPHOCYTES # BLD: 1.3 K/UL (ref 0.8–3.5)
LYMPHOCYTES NFR BLD: 17 % (ref 12–49)
MCH RBC QN AUTO: 28.2 PG (ref 26–34)
MCHC RBC AUTO-ENTMCNC: 32.9 G/DL (ref 30–36.5)
MCV RBC AUTO: 85.7 FL (ref 80–99)
MONOCYTES # BLD: 0.5 K/UL (ref 0–1)
MONOCYTES NFR BLD: 6 % (ref 5–13)
NEUTS SEG # BLD: 5.7 K/UL (ref 1.8–8)
NEUTS SEG NFR BLD: 76 % (ref 32–75)
NRBC # BLD: 0 K/UL (ref 0–0.01)
NRBC BLD-RTO: 0 PER 100 WBC
PLATELET # BLD AUTO: 179 K/UL (ref 150–400)
PMV BLD AUTO: 11.3 FL (ref 8.9–12.9)
POTASSIUM SERPL-SCNC: 4.7 MMOL/L (ref 3.5–5.1)
PROT SERPL-MCNC: 7.4 G/DL (ref 6.4–8.2)
RBC # BLD AUTO: 5.03 M/UL (ref 4.1–5.7)
SERVICE CMNT-IMP: ABNORMAL
SERVICE CMNT-IMP: ABNORMAL
SERVICE CMNT-IMP: NORMAL
SERVICE CMNT-IMP: NORMAL
SODIUM SERPL-SCNC: 140 MMOL/L (ref 136–145)
TRIGL SERPL-MCNC: 66 MG/DL (ref ?–150)
TROPONIN-HIGH SENSITIVITY: 25 NG/L (ref 0–76)
VLDLC SERPL CALC-MCNC: 13.2 MG/DL
WBC # BLD AUTO: 7.5 K/UL (ref 4.1–11.1)

## 2022-06-28 PROCEDURE — 74011000250 HC RX REV CODE- 250: Performed by: INTERNAL MEDICINE

## 2022-06-28 PROCEDURE — 74011250637 HC RX REV CODE- 250/637: Performed by: INTERNAL MEDICINE

## 2022-06-28 PROCEDURE — 74011250636 HC RX REV CODE- 250/636: Performed by: FAMILY MEDICINE

## 2022-06-28 PROCEDURE — 36415 COLL VENOUS BLD VENIPUNCTURE: CPT

## 2022-06-28 PROCEDURE — 65660000001 HC RM ICU INTERMED STEPDOWN

## 2022-06-28 PROCEDURE — A9500 TC99M SESTAMIBI: HCPCS

## 2022-06-28 PROCEDURE — 85025 COMPLETE CBC W/AUTO DIFF WBC: CPT

## 2022-06-28 PROCEDURE — 84484 ASSAY OF TROPONIN QUANT: CPT

## 2022-06-28 PROCEDURE — 80061 LIPID PANEL: CPT

## 2022-06-28 PROCEDURE — 78452 HT MUSCLE IMAGE SPECT MULT: CPT

## 2022-06-28 PROCEDURE — 80053 COMPREHEN METABOLIC PANEL: CPT

## 2022-06-28 PROCEDURE — 74011000250 HC RX REV CODE- 250: Performed by: FAMILY MEDICINE

## 2022-06-28 PROCEDURE — 74011250637 HC RX REV CODE- 250/637: Performed by: FAMILY MEDICINE

## 2022-06-28 PROCEDURE — 82962 GLUCOSE BLOOD TEST: CPT

## 2022-06-28 PROCEDURE — 74011250636 HC RX REV CODE- 250/636: Performed by: INTERNAL MEDICINE

## 2022-06-28 RX ORDER — AMLODIPINE BESYLATE 5 MG/1
10 TABLET ORAL DAILY
Status: DISCONTINUED | OUTPATIENT
Start: 2022-06-28 | End: 2022-06-28

## 2022-06-28 RX ORDER — TETRAKIS(2-METHOXYISOBUTYLISOCYANIDE)COPPER(I) TETRAFLUOROBORATE 1 MG/ML
10.7 INJECTION, POWDER, LYOPHILIZED, FOR SOLUTION INTRAVENOUS
Status: COMPLETED | OUTPATIENT
Start: 2022-06-28 | End: 2022-06-28

## 2022-06-28 RX ORDER — CLONIDINE HYDROCHLORIDE 0.1 MG/1
0.1 TABLET ORAL
COMMUNITY
End: 2022-06-30

## 2022-06-28 RX ORDER — METOPROLOL TARTRATE 50 MG/1
50 TABLET ORAL DAILY
Status: DISCONTINUED | OUTPATIENT
Start: 2022-06-28 | End: 2022-06-30 | Stop reason: HOSPADM

## 2022-06-28 RX ORDER — FUROSEMIDE 20 MG/1
20 TABLET ORAL
COMMUNITY
End: 2022-06-30

## 2022-06-28 RX ORDER — SODIUM CHLORIDE 0.9 % (FLUSH) 0.9 %
20 SYRINGE (ML) INJECTION
Status: DISCONTINUED | OUTPATIENT
Start: 2022-06-28 | End: 2022-06-28

## 2022-06-28 RX ORDER — MINOXIDIL 2.5 MG/1
2.5 TABLET ORAL DAILY
Status: DISCONTINUED | OUTPATIENT
Start: 2022-06-28 | End: 2022-06-29

## 2022-06-28 RX ADMIN — NICARDIPINE HYDROCHLORIDE 7.5 MG/HR: 25 INJECTION, SOLUTION INTRAVENOUS at 07:03

## 2022-06-28 RX ADMIN — SODIUM CHLORIDE 75 ML/HR: 9 INJECTION, SOLUTION INTRAVENOUS at 00:32

## 2022-06-28 RX ADMIN — NICARDIPINE HYDROCHLORIDE 12.5 MG/HR: 25 INJECTION, SOLUTION INTRAVENOUS at 10:41

## 2022-06-28 RX ADMIN — AMLODIPINE BESYLATE 10 MG: 5 TABLET ORAL at 06:35

## 2022-06-28 RX ADMIN — NICARDIPINE HYDROCHLORIDE 2.5 MG/HR: 25 INJECTION, SOLUTION INTRAVENOUS at 21:57

## 2022-06-28 RX ADMIN — HYDRALAZINE HYDROCHLORIDE 50 MG: 50 TABLET, FILM COATED ORAL at 10:41

## 2022-06-28 RX ADMIN — METOPROLOL TARTRATE 50 MG: 50 TABLET ORAL at 16:04

## 2022-06-28 RX ADMIN — METOPROLOL TARTRATE 12.5 MG: 25 TABLET, FILM COATED ORAL at 12:33

## 2022-06-28 RX ADMIN — NICARDIPINE HYDROCHLORIDE 7.5 MG/HR: 25 INJECTION, SOLUTION INTRAVENOUS at 00:27

## 2022-06-28 RX ADMIN — HYDRALAZINE HYDROCHLORIDE 50 MG: 50 TABLET, FILM COATED ORAL at 06:35

## 2022-06-28 RX ADMIN — MINOXIDIL 2.5 MG: 2.5 TABLET ORAL at 16:04

## 2022-06-28 RX ADMIN — CLOPIDOGREL BISULFATE 75 MG: 75 TABLET ORAL at 12:33

## 2022-06-28 RX ADMIN — PRAVASTATIN SODIUM 20 MG: 20 TABLET ORAL at 21:42

## 2022-06-28 RX ADMIN — ASPIRIN 81 MG: 81 TABLET, COATED ORAL at 12:33

## 2022-06-28 RX ADMIN — OXYCODONE HYDROCHLORIDE AND ACETAMINOPHEN 500 MG: 500 TABLET ORAL at 12:33

## 2022-06-28 RX ADMIN — NICARDIPINE HYDROCHLORIDE 12.5 MG/HR: 25 INJECTION, SOLUTION INTRAVENOUS at 16:05

## 2022-06-28 RX ADMIN — NICARDIPINE HYDROCHLORIDE 15 MG/HR: 25 INJECTION, SOLUTION INTRAVENOUS at 10:51

## 2022-06-28 RX ADMIN — SODIUM CHLORIDE, PRESERVATIVE FREE 10 ML: 5 INJECTION INTRAVENOUS at 21:42

## 2022-06-28 RX ADMIN — ACETAMINOPHEN 650 MG: 325 TABLET ORAL at 12:33

## 2022-06-28 RX ADMIN — NICARDIPINE HYDROCHLORIDE 15 MG/HR: 25 INJECTION, SOLUTION INTRAVENOUS at 14:07

## 2022-06-28 RX ADMIN — NICARDIPINE HYDROCHLORIDE 5 MG/HR: 25 INJECTION, SOLUTION INTRAVENOUS at 06:45

## 2022-06-28 RX ADMIN — HYDRALAZINE HYDROCHLORIDE 10 MG: 20 INJECTION INTRAMUSCULAR; INTRAVENOUS at 05:04

## 2022-06-28 RX ADMIN — TETRAKIS(2-METHOXYISOBUTYLISOCYANIDE)COPPER(I) TETRAFLUOROBORATE 10.7 MILLICURIE: 1 INJECTION, POWDER, LYOPHILIZED, FOR SOLUTION INTRAVENOUS at 08:45

## 2022-06-28 RX ADMIN — ACETAMINOPHEN 650 MG: 325 TABLET ORAL at 23:38

## 2022-06-28 RX ADMIN — SODIUM CHLORIDE, PRESERVATIVE FREE 10 ML: 5 INJECTION INTRAVENOUS at 06:36

## 2022-06-28 NOTE — CONSULTS
[address]    Name:  Jeniffer Cabrera  MR#:  [id number]  :  10/30/1930  ACCOUNT #:  [de-identified]  DATE OF SERVICE:  2022      HISTORY OF PRESENT ILLNESS:  This patient was seen in the emergency room at the request of the ER doctor with a history of chest pain since yesterday, coming and going, not necessarily exertional.  No other significant relieving or aggravating factors. The patient has had a history of CAD which goes back 20+ years. He had LIMA to LAD bypass surgery since then. Most recent PCI was in the right coronary artery in 2017. PAST MEDICAL HISTORY:  Other medical problems; hypertension, diabetes, hyperlipidemia, prostate cancer with palliative treatments, obstructive sleep apnea, chronic systolic and diastolic heart failure, and chronic kidney disease. ALLERGIES:  TO IODINE. On questioning, the patient denies any exertional worsening of his discomfort. Denies any dyspepsia. He denies any back pain, headache, or blurred vision. A 12-point review of systems is otherwise unremarkable. PHYSICAL EXAMINATION:  GENERAL:  This is a well-developed, pleasant, morbid obese elderly gentleman. VITAL SIGNS:  As Follows:  Blood pressure is 192/73, heart rate 54, and saturations 95%. HEENT:  Unremarkable. NECK:  Supple. No adenopathy. Chest wall nontender. LUNGS:  Clear. HEART:  Regular, moderate rhythm. No S4. No S3. No friction rub. No lifts, thrills, or heaves. ABDOMEN:  Soft, nontender. No bruits. No ascites. No palpable masses. NEUROLOGIC:  The patient is awake, alert, and appropriate. EXTREMITIES:  Mild pretibial edema. LABORATORY DATA:  His labs include high-sensitivity 4 hours apart, both benign. His NT-proBNP was 501. Creatinine is 1.89. GFR was 41. Chest x-ray demonstrates a prominent heart size, bibasilar plate-like atelectasis or scarring, and slight sliding of the diaphragm.   The EKG demonstrates tachy-bradycardia and anterior T-wave inversion, which is somewhat more prominent than previous tracings. IMPRESSION:  1. Atypical chest pain. Acute coronary insufficiency is not completely excluded, although is unlikely. 2.  Accelerated hypertension. 3.  Remote bypass surgery, more recently percutaneous coronary intervention in 2017. RECOMMENDATIONS:  1. Admit to the Hospitalist Service. 2.  Medications to bring the blood pressure down. 3.  CT scan of his chest without contrast to assess the mediastinum for widening that might suggest aortic aneurysm and/or dissection. Further recommendations to follow.     Thank you for this referral.      Samuel Barker MD      [id number]  D:  06/27/2022 17:45  T:  06/27/2022 21:19  JOB #:  [id number]

## 2022-06-28 NOTE — PROGRESS NOTES
0500: 10mg IV hydralazine given per cardiology order for SBP >170. Pause cardene gtt. Reassess at 0600. Patient to restart PO hydralazine in AM.     0630: Amlodipine and hydralazine PO given. 0700: Titrate cardizem gtt. To 7.5 mg/hr. Goal SBP<170.    0825:   TRANSFER - OUT REPORT:    Verbal report given to April(name) on Carita Canavan  being transferred to (unit) for routine progression of care       Report consisted of patients Situation, Background, Assessment and   Recommendations(SBAR). Information from the following report(s) SBAR, Kardex, [facility] and Cardiac Rhythm NSR was reviewed with the receiving nurse. Lines:   Peripheral IV 06/27/22 Left Antecubital (Active)   Site Assessment Clean, dry, & intact 06/28/22 0415   Phlebitis Assessment 0 06/28/22 0415   Infiltration Assessment 0 06/28/22 0415   Dressing Status Clean, dry, & intact 06/28/22 0415   Dressing Type Transparent 06/28/22 0415   Hub Color/Line Status Pink; Infusing 06/28/22 0415   Action Taken Blood drawn 06/27/22 1156        Opportunity for questions and clarification was provided.       Patient transported with:   Monitor  Patient's medications from home  Registered Nurse

## 2022-06-28 NOTE — PROGRESS NOTES
[facility] Progress Note    INTERVAL HISTORY  / Subjective: On Nicardipine Drip, perfusion scan performed, stress portion pending     Assessment / Plan:    Chest Pain and accelerated HTN  Dyslipidemia   --Cardiology consulted NOTES CAD which goes back 20+ years. He had LIMA to LAD bypass surgery since then. Most recent PCI was in the right coronary artery in 2017. --Nuclear perfusion scan ordered, stress portion pending improved BP, Currently on nicardipine drip   --CTA Chest is negative   --Resume DAPT, Lopressor, Minoxidil, Pravastatin     DMII  --Controlled A1C 6 RANGE     Chief Complaint : Chest Pain        Objective:  Visit Vitals  BP (!) 135/46   Pulse (!) 58   Temp 98 °F (36.7 °C)   Resp 18   Ht 5' 7\" (1.702 m)   Wt 117.9 kg (260 lb)   SpO2 97%   BMI 40.72 kg/m²                 Physical Exam:  General: No acute distress, speaking in full sentences, no use of accessory muscles   HEENT: Pupils equal and reactive to light and accommodation, oropharynx is clear   Neck: Supple, no lymphadenopathy, no JVD   Lungs: Clear to auscultation bilaterally   Cardiovascular: Regular rate and rhythm with normal S1 and S2   Abdomen: Soft, nontender, nondistended, normoactive bowel sounds   Extremities: No cyanosis clubbing or edema   Neuro: Nonfocal, A&O x3   Psych: Normal affect     Intake and Output:  Date 06/27/22 1900 - 06/28/22 0659 06/28/22 0700 - 06/29/22 0659   Shift 6012-5319 24 Hour Total 5368-6594 1142-3760 24 Hour Total   INTAKE   I.V.(mL/kg/hr) 653.3 653.3 231.7(0.2)  231.7     Cardene Volume 512.1 512.1        Volume (0.9% sodium chloride infusion) 141.3 141.3 231.7  231.7   Shift Total(mL/kg) 653. 3(5.5) 653. 3(5.5) 231.7(2)  231.7(2)   OUTPUT   Urine(mL/kg/hr) 900 900 300(0.2)  300     Urine Voided 900 900 300  300   Shift Total(mL/kg) 900(7.6) 900(7.6) 300(2.5)  300(2.5)   NET -246.7 -246.7 -68.3  -68. 3   Weight (kg) 117.9 117.9 117.9 117.9 117.9       LAB:  Admission on 06/27/2022 Component Date Value    Ventricular Rate 06/27/2022 47     Atrial Rate 06/27/2022 47     P-R Interval 06/27/2022 150     QRS Duration 06/27/2022 76     Q-T Interval 06/27/2022 440     QTC Calculation (Bezet) 06/27/2022 389     Calculated P Axis 06/27/2022 58     Calculated R Axis 06/27/2022 66     Calculated T Axis 06/27/2022 132     Diagnosis 06/27/2022                      Value:Sinus bradycardia  Cannot rule out Anterior infarct , age undetermined  Abnormal ECG  When compared with ECG of 24-MAY-2019 03:11,  premature atrial complexes are no longer present  Vent. rate has decreased BY  25 BPM  Minimal criteria for Anterior infarct are now present  T wave inversion less evident in Anterior leads  T wave inversion more evident in Lateral leads  QT has shortened  Confirmed by Josette Mishra ([id number]) on 6/27/2022 12:34:14 PM      SAMPLES BEING HELD 06/27/2022 1RED 1 BLUE 1 PST 1LAV     COMMENT 06/27/2022 Add-on orders for these samples will be processed based on acceptable specimen integrity and analyte stability, which may vary by analyte.      Ventricular Rate 06/27/2022 59     Atrial Rate 06/27/2022 59     P-R Interval 06/27/2022 184     QRS Duration 06/27/2022 84     Q-T Interval 06/27/2022 420     QTC Calculation (Bezet) 06/27/2022 415     Calculated P Axis 06/27/2022 77     Calculated R Axis 06/27/2022 74     Calculated T Axis 06/27/2022 117     Diagnosis 06/27/2022                      Value:Sinus bradycardia  T wave abnormality, consider anterolateral ischemia  When compared with ECG of 27-JUN-2022 11:41,  T wave inversion more evident in Anterior leads  T wave inversion less evident in Lateral leads  Confirmed by Dot Schulz M.D., [facility] ([id number]) on 6/27/2022 4:31:33 PM      Sodium 06/27/2022 142     Potassium 06/27/2022 4.8     Chloride 06/27/2022 106     CO2 06/27/2022 31     Anion gap 06/27/2022 5     Glucose 06/27/2022 83     BUN 06/27/2022 26*    Creatinine 06/27/2022 1.89*    BUN/Creatinine ratio 06/27/2022 14     GFR est AA 06/27/2022 41*    GFR est non-AA 06/27/2022 34*    Calcium 06/27/2022 9.7     Bilirubin, total 06/27/2022 0.7     ALT (SGPT) 06/27/2022 13     AST (SGOT) 06/27/2022 15     Alk. phosphatase 06/27/2022 70     Protein, total 06/27/2022 7.0     Albumin 06/27/2022 3.7     Globulin 06/27/2022 3.3     A-G Ratio 06/27/2022 1.1     WBC 06/27/2022 4.2     RBC 06/27/2022 4.88     HGB 06/27/2022 13.6     HCT 06/27/2022 42.6     MCV 06/27/2022 87.3     MCH 06/27/2022 27.9     MCHC 06/27/2022 31.9     RDW 06/27/2022 15.9*    PLATELET 30/13/1747 535     MPV 06/27/2022 11.8     NRBC 06/27/2022 0.0     ABSOLUTE NRBC 06/27/2022 0.00     NEUTROPHILS 06/27/2022 53     LYMPHOCYTES 06/27/2022 33     MONOCYTES 06/27/2022 12     EOSINOPHILS 06/27/2022 2     BASOPHILS 06/27/2022 0     IMMATURE GRANULOCYTES 06/27/2022 0     ABS. NEUTROPHILS 06/27/2022 2.2     ABS. LYMPHOCYTES 06/27/2022 1.4     ABS. MONOCYTES 06/27/2022 0.5     ABS. EOSINOPHILS 06/27/2022 0.1     ABS. BASOPHILS 06/27/2022 0.0     ABS. IMM.  GRANS. 06/27/2022 0.0     DF 06/27/2022 AUTOMATED     Troponin-High Sensitivity 06/27/2022 9     NT pro-BNP 06/27/2022 501*    Magnesium 06/27/2022 2.0     Lipase 06/27/2022 94     Troponin-High Sensitivity 06/27/2022 9     Hemoglobin A1c 06/27/2022 6.0*    Est. average glucose 06/27/2022 126     Troponin-High Sensitivity 06/27/2022 17     Glucose (POC) 06/27/2022 116     Performed by 06/27/2022 MARINA CHAKRABORTY     Troponin-High Sensitivity 06/28/2022 25     Sodium 06/28/2022 140     Potassium 06/28/2022 4.7     Chloride 06/28/2022 108     CO2 06/28/2022 26     Anion gap 06/28/2022 6     Glucose 06/28/2022 132*    BUN 06/28/2022 25*    Creatinine 06/28/2022 1.52*    BUN/Creatinine ratio 06/28/2022 16     GFR est AA 06/28/2022 52*    GFR est non-AA 06/28/2022 43*    Calcium 06/28/2022 9.6     Bilirubin, total 06/28/2022 0.6     ALT (SGPT) 06/28/2022 13     AST (SGOT) 06/28/2022 15     Alk. phosphatase 06/28/2022 68     Protein, total 06/28/2022 7.4     Albumin 06/28/2022 3.6     Globulin 06/28/2022 3.8     A-G Ratio 06/28/2022 0.9*    Cholesterol, total 06/28/2022 122     Triglyceride 06/28/2022 66     HDL Cholesterol 06/28/2022 56     LDL, calculated 06/28/2022 52.8     VLDL, calculated 06/28/2022 13.2     CHOL/HDL Ratio 06/28/2022 2.2     WBC 06/28/2022 7.5     RBC 06/28/2022 5.03     HGB 06/28/2022 14.2     HCT 06/28/2022 43.1     MCV 06/28/2022 85.7     MCH 06/28/2022 28.2     MCHC 06/28/2022 32.9     RDW 06/28/2022 16.1*    PLATELET 62/08/0962 481     MPV 06/28/2022 11.3     NRBC 06/28/2022 0.0     ABSOLUTE NRBC 06/28/2022 0.00     NEUTROPHILS 06/28/2022 76*    LYMPHOCYTES 06/28/2022 17     MONOCYTES 06/28/2022 6     EOSINOPHILS 06/28/2022 0     BASOPHILS 06/28/2022 0     IMMATURE GRANULOCYTES 06/28/2022 1*    ABS. NEUTROPHILS 06/28/2022 5.7     ABS. LYMPHOCYTES 06/28/2022 1.3     ABS. MONOCYTES 06/28/2022 0.5     ABS. EOSINOPHILS 06/28/2022 0.0     ABS. BASOPHILS 06/28/2022 0.0     ABS. IMM. GRANS. 06/28/2022 0.0     DF 06/28/2022 AUTOMATED     Glucose (POC) 06/28/2022 117     Performed by 06/28/2022 Quang Juddty Stress Target HR 06/28/2022 129     Glucose (POC) 06/28/2022 122*    Performed by 06/28/2022 Vera Devoid     Glucose (POC) 06/28/2022 107     Performed by 06/28/2022 Christiana Jang April        IMAGING:  CT HEAD WO CONT    Result Date: 6/27/2022  No change, negative for age. CT CHEST WO CONT    Result Date: 6/27/2022  No acute findings. XR CHEST PORT    Result Date: 6/27/2022  Cardiomegaly. Hyperinflation. No acute infiltrate. EKG:  No results found for this or any previous visit.           Carlos A Church MD  6/28/2022 7:31 PM

## 2022-06-28 NOTE — PROGRESS NOTES
Cardiology Progress Note  2022     Admit Date: 2022  Admit Diagnosis: Hypertensive urgency [I16.0]  CC: none currently    Assessment/Plan:   Resting scan done but will necessarily postpone the stress scan since the patient remains on maximum doses on the nicardipine gtt. No CP, HA, blurred vision or dyspnea  Will transition to po meds    For other plans, see orders.   Subjective: Piper Souza reports   Chest Pain:  [x]  none;  consistent with []  non-cardiac  []  atypical  []  angina             [x]  none now    []  on-going  Dyspnea: [x]  none    []  at rest    []  with exertion   []  improved    []  unchanged    []  worsening  PND:       [x]  none      []  overnight      Orthopnea:   [x]  none        []  improved         []  unchanged        []  worsening  Presyncope: [x]  none        []  improved         []  unchanged        []  worsening  Ambulated in hallway without symptoms  []  Yes  Ambulated in room without symptoms  []  Yes    Objective:    Physical Exam:  Overall VSSAF;    Visit Vitals  BP (!) 193/69   Pulse 94   Temp 98 °F (36.7 °C)   Resp 20   Ht 5' 7\" (1.702 m)   Wt 117.9 kg (260 lb)   SpO2 97%   BMI 40.72 kg/m²     Temp (24hrs), Av.1 °F (36.7 °C), Min:97.5 °F (36.4 °C), Max:98.7 °F (37.1 °C)    Patient Vitals for the past 8 hrs:   Pulse   22 1203 94   22 1100 85   22 1057 89   22 1047 89   22 1000 94   22 0951 95   22 0852 83   22 0752 91   22 0710 85   22 0600 87   22 0552 90    Patient Vitals for the past 8 hrs:   Resp   22 1203 20   22 1100 22   22 1057 24   22 1047 21   22 1000 23   22 0852 18   22 0752 22   22 0710 19   22 0600 20   22 0552 23    Patient Vitals for the past 8 hrs:   BP   22 1203 (!) 193/69   22 1100 (!) 187/62   22 1057 (!) 196/66   22 1047 (!) 200/70   22 1000 (!) 194/99   22 0951 (!) 189/63   22 4452 (!) 189/63   06/28/22 0752 (!) 175/70   06/28/22 0710 (!) 173/72   06/28/22 0600 (!) 177/68   06/28/22 0552 (!) 191/73          Intake/Output Summary (Last 24 hours) at 6/28/2022 1346  Last data filed at 6/28/2022 0415  Gross per 24 hour   Intake 653.33 ml   Output 900 ml   Net -246.67 ml       General Appearance: Well developed, well nourished, no acute distress. Ears/Nose/Mouth/Throat:   Normal MM; anicteric. JVP: WNL   Resp:   Lungs clear to auscultation bilaterally. Nl resp effort. Cardiovascular:  RRR, S1, S2 normal, no new murmur. No gallop or rub. Abdomen:   Soft, non-tender, bowel sounds are present. Extremities: No edema bilaterally. Skin:  Neuro: Warm and dry. A/O x3, grossly nonfocal    []  cath site intact w/o hematoma or bruit; distal pulse unchanged. Data Review:     Telemetry independently reviewed : []  sinus      []  chronic afib     []  par afib    []  NSVT    ECG independently reviewed:  []  NSR         []  no significant changes  [] no new ECG provided for review  Lab results reviewed as noted below. Current medications reviewed as noted below. No results for input(s): PH, PCO2, PO2 in the last 72 hours. No results for input(s): CPK, CKMB, TROIQ in the last 72 hours. Recent Labs     06/28/22  0341 06/27/22  1151    142   K 4.7 4.8    106   CO2 26 31   BUN 25* 26*   CREA 1.52* 1.89*   * 83   CA 9.6 9.7   ALB 3.6 3.7   WBC 7.5 4.2   HGB 14.2 13.6   HCT 43.1 42.6    171     Recent Labs     06/28/22  0341 06/27/22  1151   ALT 13 13   AP 68 70   TBILI 0.6 0.7   TP 7.4 7.0   ALB 3.6 3.7   GLOB 3.8 3.3   LPSE  --  94     No results for input(s): INR, PTP, APTT, INREXT in the last 72 hours. No results for input(s): FE, TIBC, PSAT, FERR in the last 72 hours.    Lab Results   Component Value Date/Time    Glucose (POC) 122 (H) 06/28/2022 12:27 PM    Glucose (POC) 117 06/28/2022 06:52 AM    Glucose (POC) 116 06/27/2022 09:36 PM    Glucose (POC) 102 (H) 05/25/2019 06:21 AM    Glucose (POC) 109 (H) 05/24/2019 04:04 PM       Current Facility-Administered Medications   Medication Dose Route Frequency    amLODIPine (NORVASC) tablet 10 mg  10 mg Oral DAILY    hydrALAZINE (APRESOLINE) 20 mg/mL injection 10 mg  10 mg IntraVENous Q6H PRN    niCARdipine (CARDENE) 25 mg in 0.9% sodium chloride 250 mL ([id number])  0-15 mg/hr IntraVENous TITRATE    ascorbic acid (vitamin C) (VITAMIN C) tablet 500 mg  500 mg Oral DAILY    aspirin delayed-release tablet 81 mg  81 mg Oral DAILY    clopidogreL (PLAVIX) tablet 75 mg  75 mg Oral DAILY    metoprolol tartrate (LOPRESSOR) tablet 12.5 mg  12.5 mg Oral DAILY    pravastatin (PRAVACHOL) tablet 20 mg  20 mg Oral QHS    sodium chloride (NS) flush 5-40 mL  5-40 mL IntraVENous Q8H    sodium chloride (NS) flush 5-40 mL  5-40 mL IntraVENous PRN    0.9% sodium chloride infusion  75 mL/hr IntraVENous CONTINUOUS    nitroglycerin (NITROSTAT) tablet 0.4 mg  0.4 mg SubLINGual Q5MIN PRN    acetaminophen (TYLENOL) tablet 650 mg  650 mg Oral Q4H PRN    glucose chewable tablet 16 g  4 Tablet Oral PRN    dextrose 10 % infusion 0-250 mL  0-250 mL IntraVENous PRN    glucagon (GLUCAGEN) injection 1 mg  1 mg IntraMUSCular PRN    insulin lispro (HUMALOG) injection   SubCUTAneous AC&HS    hydrALAZINE (APRESOLINE) tablet 50 mg  50 mg Oral TID        Robyn Ho MD

## 2022-06-28 NOTE — PROGRESS NOTES
Problem: Falls - Risk of  Goal: *Absence of Falls  Description: Document Karley Rinaldi Fall Risk and appropriate interventions in the flowsheet.   Outcome: Progressing Towards Goal  Note: Fall Risk Interventions:            Medication Interventions: Evaluate medications/consider consulting pharmacy    Elimination Interventions: Urinal in reach              Problem: Patient Education: Go to Patient Education Activity  Goal: Patient/Family Education  Outcome: Progressing Towards Goal

## 2022-06-28 NOTE — PROGRESS NOTES
Bedside shift change report given to [facility] (oncoming nurse) by MIRANDA Tejada (offgoing nurse). Report included the following information SBAR, Kardex, Intake/Output, MAR, Accordion and Cardiac Rhythm NSR-Sinus ellis.

## 2022-06-28 NOTE — PROGRESS NOTES
0800: [place]    1110: patient ADALGISA to [facility] med to be sent to [facility] after stress test    1115: Receiving RN made aware patient to come after stress test.

## 2022-06-29 ENCOUNTER — APPOINTMENT (OUTPATIENT)
Dept: NON INVASIVE DIAGNOSTICS | Age: 87
DRG: 305 | End: 2022-06-29
Attending: FAMILY MEDICINE
Payer: MEDICARE

## 2022-06-29 LAB
ANION GAP SERPL CALC-SCNC: 5 MMOL/L (ref 5–15)
BUN SERPL-MCNC: 22 MG/DL (ref 6–20)
BUN/CREAT SERPL: 17 (ref 12–20)
CALCIUM SERPL-MCNC: 8.7 MG/DL (ref 8.5–10.1)
CHLORIDE SERPL-SCNC: 113 MMOL/L (ref 97–108)
CO2 SERPL-SCNC: 25 MMOL/L (ref 21–32)
COMMENT, HOLDF: NORMAL
CREAT SERPL-MCNC: 1.31 MG/DL (ref 0.7–1.3)
ECHO AV PEAK GRADIENT: 7 MMHG
ECHO AV PEAK VELOCITY: 1.3 M/S
ECHO AV VELOCITY RATIO: 0.92
ECHO LA DIAMETER INDEX: 1.86 CM/M2
ECHO LA DIAMETER: 4.2 CM
ECHO LV FRACTIONAL SHORTENING: 25 % (ref 28–44)
ECHO LV INTERNAL DIMENSION DIASTOLE INDEX: 1.77 CM/M2
ECHO LV INTERNAL DIMENSION DIASTOLIC: 4 CM (ref 4.2–5.9)
ECHO LV INTERNAL DIMENSION SYSTOLIC INDEX: 1.33 CM/M2
ECHO LV INTERNAL DIMENSION SYSTOLIC: 3 CM
ECHO LV IVSD: 1.5 CM (ref 0.6–1)
ECHO LV MASS 2D: 220.7 G (ref 88–224)
ECHO LV MASS INDEX 2D: 97.6 G/M2 (ref 49–115)
ECHO LV POSTERIOR WALL DIASTOLIC: 1.4 CM (ref 0.6–1)
ECHO LV RELATIVE WALL THICKNESS RATIO: 0.7
ECHO LVOT MEAN GRADIENT: 2 MMHG
ECHO LVOT PEAK GRADIENT: 6 MMHG
ECHO LVOT PEAK VELOCITY: 1.2 M/S
ECHO LVOT VTI: 27.3 CM
ECHO MV A VELOCITY: 1.08 M/S
ECHO MV AREA PHT: 3.1 CM2
ECHO MV E DECELERATION TIME (DT): 241.7 MS
ECHO MV E VELOCITY: 0.88 M/S
ECHO MV E/A RATIO: 0.81
ECHO MV PRESSURE HALF TIME (PHT): 70.1 MS
ECHO PV MAX VELOCITY: 1.1 M/S
ECHO PV PEAK GRADIENT: 4 MMHG
ECHO RV INTERNAL DIMENSION: 3 CM
ECHO TV REGURGITANT MAX VELOCITY: 1.93 M/S
ECHO TV REGURGITANT PEAK GRADIENT: 15 MMHG
GLUCOSE BLD STRIP.AUTO-MCNC: 111 MG/DL (ref 65–117)
GLUCOSE BLD STRIP.AUTO-MCNC: 111 MG/DL (ref 65–117)
GLUCOSE BLD STRIP.AUTO-MCNC: 118 MG/DL (ref 65–117)
GLUCOSE BLD STRIP.AUTO-MCNC: 139 MG/DL (ref 65–117)
GLUCOSE SERPL-MCNC: 108 MG/DL (ref 65–100)
POTASSIUM SERPL-SCNC: 4.3 MMOL/L (ref 3.5–5.1)
SAMPLES BEING HELD,HOLD: NORMAL
SERVICE CMNT-IMP: ABNORMAL
SERVICE CMNT-IMP: ABNORMAL
SERVICE CMNT-IMP: NORMAL
SERVICE CMNT-IMP: NORMAL
SODIUM SERPL-SCNC: 143 MMOL/L (ref 136–145)
STRESS BASELINE DIAS BP: 76 MMHG
STRESS BASELINE HR: 59 BPM
STRESS BASELINE SYS BP: 146 MMHG
STRESS ESTIMATED WORKLOAD: 1 METS
STRESS EXERCISE DUR MIN: 3 MIN
STRESS EXERCISE DUR SEC: 0 SEC
STRESS PEAK DIAS BP: 72 MMHG
STRESS PEAK SYS BP: 157 MMHG
STRESS PERCENT HR ACHIEVED: 70 %
STRESS POST PEAK HR: 90 BPM
STRESS RATE PRESSURE PRODUCT: NORMAL BPM*MMHG
STRESS STAGE 1 BP: NORMAL MMHG
STRESS STAGE 1 HR: 75 BPM
STRESS STAGE RECOVERY 1 BP: NORMAL MMHG
STRESS STAGE RECOVERY 1 HR: 71 BPM
STRESS TARGET HR: 129 BPM

## 2022-06-29 PROCEDURE — C8929 TTE W OR WO FOL WCON,DOPPLER: HCPCS

## 2022-06-29 PROCEDURE — 74011000250 HC RX REV CODE- 250: Performed by: HOSPITALIST

## 2022-06-29 PROCEDURE — 80048 BASIC METABOLIC PNL TOTAL CA: CPT

## 2022-06-29 PROCEDURE — 36415 COLL VENOUS BLD VENIPUNCTURE: CPT

## 2022-06-29 PROCEDURE — 74011000250 HC RX REV CODE- 250: Performed by: INTERNAL MEDICINE

## 2022-06-29 PROCEDURE — 74011250637 HC RX REV CODE- 250/637: Performed by: FAMILY MEDICINE

## 2022-06-29 PROCEDURE — 74011250637 HC RX REV CODE- 250/637: Performed by: INTERNAL MEDICINE

## 2022-06-29 PROCEDURE — 74011000250 HC RX REV CODE- 250: Performed by: FAMILY MEDICINE

## 2022-06-29 PROCEDURE — 74011250636 HC RX REV CODE- 250/636: Performed by: INTERNAL MEDICINE

## 2022-06-29 PROCEDURE — 74011250636 HC RX REV CODE- 250/636: Performed by: HOSPITALIST

## 2022-06-29 PROCEDURE — 65660000001 HC RM ICU INTERMED STEPDOWN

## 2022-06-29 PROCEDURE — 82962 GLUCOSE BLOOD TEST: CPT

## 2022-06-29 PROCEDURE — 77010033678 HC OXYGEN DAILY

## 2022-06-29 RX ORDER — HYDRALAZINE HYDROCHLORIDE 50 MG/1
100 TABLET, FILM COATED ORAL 3 TIMES DAILY
Status: DISCONTINUED | OUTPATIENT
Start: 2022-06-29 | End: 2022-06-30 | Stop reason: HOSPADM

## 2022-06-29 RX ORDER — TETRAKIS(2-METHOXYISOBUTYLISOCYANIDE)COPPER(I) TETRAFLUOROBORATE 1 MG/ML
31.3 INJECTION, POWDER, LYOPHILIZED, FOR SOLUTION INTRAVENOUS
Status: COMPLETED | OUTPATIENT
Start: 2022-06-29 | End: 2022-06-29

## 2022-06-29 RX ORDER — SODIUM CHLORIDE 0.9 % (FLUSH) 0.9 %
20 SYRINGE (ML) INJECTION
Status: COMPLETED | OUTPATIENT
Start: 2022-06-29 | End: 2022-06-29

## 2022-06-29 RX ORDER — FUROSEMIDE 40 MG/1
40 TABLET ORAL
Status: DISCONTINUED | OUTPATIENT
Start: 2022-06-29 | End: 2022-06-30 | Stop reason: HOSPADM

## 2022-06-29 RX ORDER — HYDRALAZINE HYDROCHLORIDE 50 MG/1
50 TABLET, FILM COATED ORAL 3 TIMES DAILY
Status: DISCONTINUED | OUTPATIENT
Start: 2022-06-29 | End: 2022-06-29

## 2022-06-29 RX ORDER — MINOXIDIL 2.5 MG/1
5 TABLET ORAL EVERY 12 HOURS
Status: DISCONTINUED | OUTPATIENT
Start: 2022-06-29 | End: 2022-06-30 | Stop reason: HOSPADM

## 2022-06-29 RX ORDER — HYDRALAZINE HYDROCHLORIDE 50 MG/1
100 TABLET, FILM COATED ORAL 3 TIMES DAILY
Status: DISCONTINUED | OUTPATIENT
Start: 2022-06-29 | End: 2022-06-29

## 2022-06-29 RX ORDER — MINOXIDIL 2.5 MG/1
2.5 TABLET ORAL EVERY 12 HOURS
Status: DISCONTINUED | OUTPATIENT
Start: 2022-06-29 | End: 2022-06-29

## 2022-06-29 RX ADMIN — HYDRALAZINE HYDROCHLORIDE 10 MG: 20 INJECTION INTRAMUSCULAR; INTRAVENOUS at 15:40

## 2022-06-29 RX ADMIN — ASPIRIN 81 MG: 81 TABLET, COATED ORAL at 08:10

## 2022-06-29 RX ADMIN — REGADENOSON 0.4 MG: 0.08 INJECTION, SOLUTION INTRAVENOUS at 10:11

## 2022-06-29 RX ADMIN — SODIUM CHLORIDE, PRESERVATIVE FREE 10 ML: 5 INJECTION INTRAVENOUS at 06:34

## 2022-06-29 RX ADMIN — SODIUM CHLORIDE, PRESERVATIVE FREE 10 ML: 5 INJECTION INTRAVENOUS at 22:22

## 2022-06-29 RX ADMIN — MINOXIDIL 5 MG: 2.5 TABLET ORAL at 20:47

## 2022-06-29 RX ADMIN — NICARDIPINE HYDROCHLORIDE 10 MG/HR: 25 INJECTION, SOLUTION INTRAVENOUS at 07:38

## 2022-06-29 RX ADMIN — CLOPIDOGREL BISULFATE 75 MG: 75 TABLET ORAL at 08:10

## 2022-06-29 RX ADMIN — PERFLUTREN 1 ML: 6.52 INJECTION, SUSPENSION INTRAVENOUS at 16:15

## 2022-06-29 RX ADMIN — MINOXIDIL 2.5 MG: 2.5 TABLET ORAL at 08:10

## 2022-06-29 RX ADMIN — METOPROLOL TARTRATE 50 MG: 50 TABLET ORAL at 08:10

## 2022-06-29 RX ADMIN — SODIUM CHLORIDE, PRESERVATIVE FREE 20 ML: 5 INJECTION INTRAVENOUS at 10:11

## 2022-06-29 RX ADMIN — OXYCODONE HYDROCHLORIDE AND ACETAMINOPHEN 500 MG: 500 TABLET ORAL at 08:10

## 2022-06-29 RX ADMIN — NICARDIPINE HYDROCHLORIDE 5 MG/HR: 25 INJECTION, SOLUTION INTRAVENOUS at 02:36

## 2022-06-29 RX ADMIN — ACETAMINOPHEN 650 MG: 325 TABLET ORAL at 22:22

## 2022-06-29 RX ADMIN — PRAVASTATIN SODIUM 20 MG: 20 TABLET ORAL at 20:47

## 2022-06-29 RX ADMIN — HYDRALAZINE HYDROCHLORIDE 100 MG: 50 TABLET, FILM COATED ORAL at 22:20

## 2022-06-29 RX ADMIN — TETRAKIS(2-METHOXYISOBUTYLISOCYANIDE)COPPER(I) TETRAFLUOROBORATE 31.3 MILLICURIE: 1 INJECTION, POWDER, LYOPHILIZED, FOR SOLUTION INTRAVENOUS at 10:25

## 2022-06-29 RX ADMIN — ACETAMINOPHEN 650 MG: 325 TABLET ORAL at 15:39

## 2022-06-29 RX ADMIN — FUROSEMIDE 40 MG: 40 TABLET ORAL at 18:07

## 2022-06-29 NOTE — PROGRESS NOTES
ELISA:  RUR: 10%    Disposition:  Home when medically stable    Care Management Interventions  PCP Verified by CM: Yes  Last Visit to PCP: 01/29/22  Discharge Durable Medical Equipment: No  Physical Therapy Consult: No  Occupational Therapy Consult: No  Speech Therapy Consult: No  Support Systems: Spouse/Significant Other,Child(eloy)  Confirm Follow Up Transport: Family  Discharge Location  Patient Expects to be Discharged to[de-identified] Home     Reason for Admission:  Chest pain                     RUR Score:     10% low                Plan for utilizing home health:     Not indicated     PCP: First and Last name:  Guillermo Tabares MD     Name of Practice: [facility]   Are you a current patient: Yes/No: yes   Approximate date of last visit: 6 months ago   Can you participate in a virtual visit with your PCP:                     Current Advanced Directive/Advance Care Plan: Full Code      Healthcare Decision Maker:   Click here to complete [address] including selection of the [address] Relationship (ie \"Primary\")   Jayne Laura (dter) primary decision maker [phone]  Harleen Jimenez (dter) secondary decision maker [phone]           Transition of Care Plan:    Home once medically stable                    Chart reviewed. The patient was admittd here 6/27/22 from home. He has a past medical hx of CAD, Prostate CA, HTN, MI and Sleep Apnea. Patient lives with spouse in a 2 story residence with chair lift to 2nd floor and chair lift to basement. Pharm:  [facility]  DME:     [employer] lyft, handicapped rails in bathrooms, shower chair    CM continuing to follow.     Moi Gallardo, [address], [address] [place] [address]

## 2022-06-29 NOTE — PROGRESS NOTES
2000 Received patient from April, RN.    5277 Bedside shift change report given to Jeannie Deng RN (oncoming nurse) by Alyse Barrett RN. Report included the following information SBAR, Kardex, MAR, Recent Results, and Cardiac Rhythm NSR. Problem: Falls - Risk of  Goal: *Absence of Falls  Description: Document Silverio Reason Fall Risk and appropriate interventions in the flowsheet.   Outcome: Progressing Towards Goal  Note: Fall Risk Interventions:            Medication Interventions: Evaluate medications/consider consulting pharmacy    Elimination Interventions: Urinal in reach

## 2022-06-29 NOTE — PROGRESS NOTES
Physician Progress Note      PATIENT:               Lakeshia Maloney  CSN #:                  [id number]  :                       10/30/1930  ADMIT DATE:       2022 1:08 PM  [address] [place] DATE:  RESPONDING  PROVIDER #:        Misael Little MD          QUERY TEXT:    Patient admitted with HTN , noted to have KOJO on CKD If possible, please document in progress notes and discharge summary if you are evaluating and/or treating any of the following: The medical record reflects the following:  Risk Factors: HTN  Clinical Indicators:  2022 11:51  BUN: 26 (H)  Creatinine: 1.89 (H)  GFR est AA: 41 (L)    2022 03:41  BUN: 25 (H)  Creatinine: 1.52 (H)  GFR est AA: 52 (L)    2022 04:06  BUN: 22 (H)  Creatinine: 1.31 (H)  GFR est AA: >60    Treatment: lab monitoring, improved to WNL with fluids    Thank you,  Silvia Sahni RN, CDI, CRCR, CCDS  Certified Clinical Documentation Integrity  Specialist  [phone]  You can also contact me via [employer]. Options provided:  -- CKD Stage 3a GFR 45-59  -- CKD Stage 3b GFR 30-44  -- CKD ruled out  -- Other - I will add my own diagnosis  -- Disagree - Not applicable / Not valid  -- Disagree - Clinically unable to determine / Unknown  -- Refer to Clinical Documentation Reviewer    PROVIDER RESPONSE TEXT:    This patient has CKD Stage 3a. Query created by: Adams Bright on 2022 2:31 PM      QUERY TEXT:    Patient admitted with BMI 40.72 kg/m 2. If possible, please document in progress notes and discharge summary if you are evaluating and /or treating any of the following:     The medical record reflects the following:  Risk Factors:  elevated BMI  Clinical Indicators:  BMI: 40.72 kg/m 2  Ht: 5' 7\" (1.702 m)  Wt: 117.9 kg (260 lb)    Treatment: ADULT DIET Regular; 3 carb choices (45 gm/meal)    Thank you,  Silvia Sahni RN, CDI, CRCR, CCDS  Certified Clinical Documentation Integrity  Specialist  [phone]  You can also contact me via [employer].  Options provided:  -- Morbid obesity with BMI 40.72 kg/m  -- Severe obesity with BMI 40.72 kg/m  -- BMI not clinically significant  -- Other - I will add my own diagnosis  -- Disagree - Not applicable / Not valid  -- Disagree - Clinically unable to determine / Unknown  -- Refer to Clinical Documentation Reviewer    PROVIDER RESPONSE TEXT:    This patient has morbid obesity with BMI 40.72 kg/m .     Query created by: Manuela Carmona on 6/29/2022 2:34 PM      Electronically signed by:  Timo Rosen MD 6/29/2022 2:48 PM

## 2022-06-29 NOTE — PROGRESS NOTES
The care plan was reviewed with patient. Care plan options were discussed and questions answered. The patient understand instructions and will follow up as directed.

## 2022-06-29 NOTE — PROGRESS NOTES
Spiritual Care Partner Volunteer visited patient in Room 435 on 6.29.22.     Documented by:  Chaplain Cedeño MDiv, [address] [place] ([id number])

## 2022-06-29 NOTE — PROGRESS NOTES
Cardiology Progress Note  2022    Admit Date: 2022  Admit Diagnosis: Hypertensive urgency [I16.0]  CC:                                                        Assessment:     Active Problems:    Hypertensive urgency (2022)        Plan:     CAD: Today's MPI showed normal perfusion. No need for coronary angiography. HTN: Accelerated and somewhat difficult to control. Will plan to use minoxidil on a temporary basis along with other meds to achieve better control. This will delay his discharge some. Rhythm: normal sinus rhythm    Volume status:euvolemic    Renal function: stable      For all other plans, see orders. [x]     High complexity decision making was performed    Subjective:  Patient reports  []   nothing; unable to communicate    []    intubated   Chest Pain:  [x]   none,  consistent with []   non-cardiac  []   atypical  []   angina             [x]     none now      []     on-going  Dyspnea: [x]   none   []   at rest   []   with exertion   []   improved   []   unchanged   []   worsening  PND:        [x]     none   []     overnight    Orthopnea:   [x]     none     []     improved     []     unchanged     []     worsening  Presyncope: [x]     none     []     improved     []     unchanged     []     worsening  Ambulated in hallway without symptoms  []     Yes  Ambulated in room without symptoms  []     Yes  ROS(2+other systems)   Hematuria: []   Yes      [x]   No.        Dysuria: []   Yes      [x]   No                                           Cough:       []   Yes      [x]   No.        Sputum: []   Yes      [x]   No                                            Hematochezia: []   Yes    [x]   No     Melena: []   Yes       [x]   No                                            No change in family and social history from H&P/Consult note.     Objective:    Physical Exam:  24 hr VS reviewed, overall VSSAF  Temp (24hrs), Av.8 °F (37.1 °C), Min:98.5 °F (36.9 °C), Max:99.4 °F (37.4 °C)    Patient Vitals for the past 8 hrs:   Pulse   06/29/22 1531 71   06/29/22 1412 73   06/29/22 1158 70    Patient Vitals for the past 8 hrs:   Resp   06/29/22 1531 22   06/29/22 1158 22    Patient Vitals for the past 8 hrs:   BP   06/29/22 1619 (!) 150/83   06/29/22 1613 (!) 210/80   06/29/22 1531 (!) 210/80   06/29/22 1158 (!) 172/65   06/29/22 0924 (!) 138/49          Intake/Output Summary (Last 24 hours) at 6/29/2022 1656  Last data filed at 6/29/2022 0135  Gross per 24 hour   Intake 2253.75 ml   Output 100 ml   Net 2153.75 ml     General Appearance:   [x]     well developed, well nourished,      [x]     NAD. []     agitated      []     lethargic but arousable      []     obtunded   ENT, Palate:    [x]     WNL     []     dry palate/MM     [x]     anicteric     Respiratory:    [x] CTA bilateral   [] rales   [] rhonchi   [] normal resp effort      [] similar to yesterday    [] worse     [] improved   Cardiovascular:   [x]  RRR   []     Irregular rate and rhythm   [x]  Normal S1, S2   [x]     No gallop or rub. [] no murmur   [x]     no new murmur  []   murmur c/w:   [x] no edema;     RLE:[]     1+    []     2+    []     3+;                              LLE:[]     1+    []      2+    []      3+      []   edema similar to yesterday     []   worse      []   improved   [x]     normal JVP       []     Elevated JVP    [x]     JVP similar to yesterday     []   worse      []   improved   [x]     carotid upstroke unchanged   [x]     abd aorta not palpated   [x]     no stigmata of peripheral emboli   GI:    [x]     abd soft, non-distented,bowel sounds present, no                     organomegaly appreciated   Skin:  Neuro:    Cath Site:  [x]     warm and dry      []     cold extremities   [x]  A/O x 3, grossly non-focal     [] Obtunded    [] sedated     []   intact w/o hematoma or bruit; distal pulse unchanged. Data Review:  Lab results reviewed as noted below.                              Current medications reviewed as noted below. Telemetry independently reviewed : [] sinus    [] chronic afib     [] par afib    [] NSVT    ECG independently reviewed: [] NSR    [] no significant changes   [] no new ECG provided for review    No results for input(s): PH, PCO2, PO2 in the last 72 hours. No results for input(s): CPK, CKMB, TROIQ in the last 72 hours. Recent Labs     06/29/22  0406 06/28/22  0341 06/27/22  1151    140 142   K 4.3 4.7 4.8   * 108 106   CO2 25 26 31   BUN 22* 25* 26*   CREA 1.31* 1.52* 1.89*   * 132* 83   CA 8.7 9.6 9.7   ALB  --  3.6 3.7   WBC  --  7.5 4.2   HGB  --  14.2 13.6   HCT  --  43.1 42.6   PLT  --  179 171     Recent Labs     06/28/22  0341 06/27/22  1151   ALT 13 13   AP 68 70   TBILI 0.6 0.7   TP 7.4 7.0   ALB 3.6 3.7   GLOB 3.8 3.3   LPSE  --  94     No results for input(s): INR, PTP, APTT, INREXT in the last 72 hours. No results for input(s): FE, TIBC, PSAT, FERR in the last 72 hours.    Lab Results   Component Value Date/Time    Glucose (POC) 139 (H) 06/29/2022 04:52 PM    Glucose (POC) 111 06/29/2022 12:51 PM    Glucose (POC) 111 06/29/2022 06:31 AM    Glucose (POC) 127 (H) 06/28/2022 08:52 PM    Glucose (POC) 107 06/28/2022 03:56 PM       Current Facility-Administered Medications   Medication Dose Route Frequency    minoxidiL (LONITEN) tablet 2.5 mg  2.5 mg Oral Q12H    furosemide (LASIX) tablet 40 mg  40 mg Oral ACB&D    hydrALAZINE (APRESOLINE) tablet 50 mg  50 mg Oral TID    perflutren lipid microspheres (DEFINITY) in NS bolus IV  1 mL IntraVENous PRN    metoprolol tartrate (LOPRESSOR) tablet 50 mg  50 mg Oral DAILY    hydrALAZINE (APRESOLINE) 20 mg/mL injection 10 mg  10 mg IntraVENous Q6H PRN    ascorbic acid (vitamin C) (VITAMIN C) tablet 500 mg  500 mg Oral DAILY    aspirin delayed-release tablet 81 mg  81 mg Oral DAILY    clopidogreL (PLAVIX) tablet 75 mg  75 mg Oral DAILY    pravastatin (PRAVACHOL) tablet 20 mg  20 mg Oral QHS    sodium chloride (NS) flush 5-40 mL  5-40 mL IntraVENous Q8H    sodium chloride (NS) flush 5-40 mL  5-40 mL IntraVENous PRN    nitroglycerin (NITROSTAT) tablet 0.4 mg  0.4 mg SubLINGual Q5MIN PRN    acetaminophen (TYLENOL) tablet 650 mg  650 mg Oral Q4H PRN    glucose chewable tablet 16 g  4 Tablet Oral PRN    dextrose 10 % infusion 0-250 mL  0-250 mL IntraVENous PRN    glucagon (GLUCAGEN) injection 1 mg  1 mg IntraMUSCular PRN    insulin lispro (HUMALOG) injection   SubCUTAneous AC&HS         Mao Dimas MD

## 2022-06-29 NOTE — PROGRESS NOTES
Bedside and Verbal shift change report given to MIRANDA Watts (oncoming nurse) by Aleena Billings (offgoing nurse). Report included the following information SBAR, Kardex, Intake/Output, MAR, Recent Results and Cardiac Rhythm NSR.

## 2022-06-30 ENCOUNTER — APPOINTMENT (OUTPATIENT)
Dept: VASCULAR SURGERY | Age: 87
DRG: 305 | End: 2022-06-30
Attending: INTERNAL MEDICINE
Payer: MEDICARE

## 2022-06-30 VITALS
HEIGHT: 67 IN | OXYGEN SATURATION: 97 % | WEIGHT: 259.92 LBS | RESPIRATION RATE: 20 BRPM | SYSTOLIC BLOOD PRESSURE: 130 MMHG | HEART RATE: 85 BPM | TEMPERATURE: 98.9 F | BODY MASS INDEX: 40.8 KG/M2 | DIASTOLIC BLOOD PRESSURE: 68 MMHG

## 2022-06-30 LAB
GLUCOSE BLD STRIP.AUTO-MCNC: 102 MG/DL (ref 65–117)
GLUCOSE BLD STRIP.AUTO-MCNC: 118 MG/DL (ref 65–117)
SERVICE CMNT-IMP: ABNORMAL
SERVICE CMNT-IMP: NORMAL

## 2022-06-30 PROCEDURE — 97530 THERAPEUTIC ACTIVITIES: CPT

## 2022-06-30 PROCEDURE — 74011250637 HC RX REV CODE- 250/637: Performed by: INTERNAL MEDICINE

## 2022-06-30 PROCEDURE — 74011250637 HC RX REV CODE- 250/637: Performed by: FAMILY MEDICINE

## 2022-06-30 PROCEDURE — 82962 GLUCOSE BLOOD TEST: CPT

## 2022-06-30 PROCEDURE — 93975 VASCULAR STUDY: CPT

## 2022-06-30 PROCEDURE — 97165 OT EVAL LOW COMPLEX 30 MIN: CPT

## 2022-06-30 PROCEDURE — 74011000250 HC RX REV CODE- 250: Performed by: FAMILY MEDICINE

## 2022-06-30 RX ORDER — FUROSEMIDE 40 MG/1
40 TABLET ORAL 2 TIMES DAILY
Qty: 60 TABLET | Refills: 0 | Status: SHIPPED | OUTPATIENT
Start: 2022-06-30

## 2022-06-30 RX ORDER — FUROSEMIDE 40 MG/1
40 TABLET ORAL 2 TIMES DAILY
Qty: 60 TABLET | Refills: 0 | Status: SHIPPED | OUTPATIENT
Start: 2022-06-30 | End: 2022-06-30 | Stop reason: SDUPTHER

## 2022-06-30 RX ORDER — METOPROLOL TARTRATE 25 MG/1
50 TABLET, FILM COATED ORAL 2 TIMES DAILY
Qty: 60 TABLET | Refills: 0 | Status: SHIPPED
Start: 2022-06-30

## 2022-06-30 RX ORDER — HYDRALAZINE HYDROCHLORIDE 100 MG/1
100 TABLET, FILM COATED ORAL 3 TIMES DAILY
Qty: 90 TABLET | Refills: 0 | Status: SHIPPED | OUTPATIENT
Start: 2022-06-30 | End: 2022-06-30 | Stop reason: SDUPTHER

## 2022-06-30 RX ORDER — HYDRALAZINE HYDROCHLORIDE 100 MG/1
100 TABLET, FILM COATED ORAL 3 TIMES DAILY
Qty: 90 TABLET | Refills: 0 | Status: SHIPPED | OUTPATIENT
Start: 2022-06-30

## 2022-06-30 RX ADMIN — SODIUM CHLORIDE, PRESERVATIVE FREE 10 ML: 5 INJECTION INTRAVENOUS at 07:15

## 2022-06-30 RX ADMIN — HYDRALAZINE HYDROCHLORIDE 100 MG: 50 TABLET, FILM COATED ORAL at 08:19

## 2022-06-30 RX ADMIN — METOPROLOL TARTRATE 50 MG: 50 TABLET ORAL at 08:19

## 2022-06-30 RX ADMIN — FUROSEMIDE 40 MG: 40 TABLET ORAL at 07:15

## 2022-06-30 RX ADMIN — OXYCODONE HYDROCHLORIDE AND ACETAMINOPHEN 500 MG: 500 TABLET ORAL at 08:19

## 2022-06-30 RX ADMIN — ASPIRIN 81 MG: 81 TABLET, COATED ORAL at 08:19

## 2022-06-30 RX ADMIN — MINOXIDIL 5 MG: 2.5 TABLET ORAL at 08:19

## 2022-06-30 RX ADMIN — CLOPIDOGREL BISULFATE 75 MG: 75 TABLET ORAL at 08:19

## 2022-06-30 NOTE — DISCHARGE SUMMARY
Discharge Summary     Patient:  Bridgette Kim       MRN: [id number]       YOB: 1930       Age: 80 y.o. Date of admission:  6/27/2022    Date of discharge:  6/30/2022    Primary care provider: Dr. Harika Da Silva, Stephanie Govea MD    Admitting provider:  Monie Gandhi MD    Discharging provider:  Rubio Underwood [address].: [phone]. If unavailable, call [phone] and ask the  to page the triage hospitalist.    Consultations  · IP CONSULT TO CARDIOLOGY    Procedures  · * No surgery found *    Discharge destination: Home with [facility]. The patient is stable for discharge. Admission diagnosis  · Hypertensive urgency [I16.0]    Current Discharge Medication List      START taking these medications    Details   hydrALAZINE (APRESOLINE) 100 mg tablet Take 1 Tablet by mouth three (3) times daily. Qty: 90 Tablet, Refills: 0  Start date: 6/30/2022         CONTINUE these medications which have CHANGED    Details   metoprolol tartrate (LOPRESSOR) 25 mg tablet Take 2 Tablets by mouth two (2) times a day. Qty: 60 Tablet, Refills: 0  Start date: 6/30/2022      furosemide (LASIX) 40 mg tablet Take 1 Tablet by mouth two (2) times a day. Qty: 60 Tablet, Refills: 0  Start date: 6/30/2022         CONTINUE these medications which have NOT CHANGED    Details   ascorbic acid, vitamin C, (VITAMIN C) 500 mg tablet Take 500 mg by mouth daily. clopidogrel (PLAVIX) 75 mg tab Take 1 Tab by mouth daily. Indications: Thrombosis Prevention after PCI  Qty: 90 Tab, Refills: 3      docusate sodium (COLACE) 100 mg capsule Take 100 mg by mouth nightly as needed for Constipation. VITAMIN E/QUININE SULFATE (LEG CRAMP TREATMENT PO) Take 1 Cap by mouth nightly. Filiberto's leg cramps      aspirin delayed-release 81 mg tablet Take 1 Tab by mouth daily.   Qty: 100 Tab, Refills: 4      pravastatin (PRAVACHOL) 20 mg tablet Take 20 mg by mouth nightly. amLODIPine (NORVASC) 10 mg tablet Take 5 mg by mouth daily. bicalutamide (CASODEX) 50 mg tablet Take 50 mg by mouth daily. STOP taking these medications       cloNIDine HCL (CATAPRES) 0.1 mg tablet Comments:   Reason for Stopping:         methocarbamol (ROBAXIN) 500 mg tablet Comments:   Reason for Stopping: Follow-up Information     Follow up With Specialties Details Why Contact Info    Reji Harris MD Family Medicine In 1 week  [address] [facility] [address] [phone]      [facility], [address] [place] [address] Vascular Surgery, Interventional Cardiology Physician, Cardiovascular Disease Physician  As needed [address] [facility]  [address] [facility]  [address]  [phone]            Final discharge diagnoses and brief hospital course  Per HPI \" Marion Ford is a 80 y.o. male who presents with chest pain     History was probably obtained from the patient as well as his family member present at the bedside     Patient reports that he started having some chest pain that started yesterday, patient reports that the chest pain does not radiate, reports that it is dull substernal, denies any nausea or vomiting associated with this chest pain, patient reports that he has history of coronary artery disease and has had multiple stents, patient reports that he uses CPAP at night and has history of obstructive sleep apnea, reports that the chest pain did not nereida today he got concerned and decided to come to the hospital, patient also complains of frontal headache, denies any focal neurological deficit with that\"     Chest Pain - resolved. ACS ruled out   -- hx CAD  20+ years ago, pt had LIMA to LAD bypass surgery.  Most recent PCI was in the right coronary artery in 2017.  - appreciate cardiology input   - Stress test negative for ischemia   --CTA Chest is negative   - Echo:  EF of 60 - 65%.  technically difficult with poor endocardial visualization   -- c/w DAPT, Lopressor, Pravastatin     Hypertensive urgency - BP improved  - c/w metoprolol, hydralazine, amlodipine   - renal duplex: No evidence of hemodynamically significant right or left renal artery stenosis. - discussed with cardiology Dr. Júnior Landeros- ok to DC     KOJO on CKD 3- improved  - cr at baseline     Incidental findings:  · There is a right renal cyst measuring 2 by 2.7 centimeters. · There are two left renal cysts, the largest measuring 3 by 4 centimeters. · There is an incidental finding of a liver cyst measuring 3.1 by 4.8 centimeters      Discharge recommendations:  PCP f/u in 1 week  Cardiology in 1-2 weeks  BMP in 1 week  Monitor BP at home   Sleep study as outpt      Discharge plan discussed with patient and his daughter Layo Mireles on phone. They understood and agreed     Physical examination at discharge  Visit Vitals  BP (!) 155/43 (BP 1 Location: Right upper arm, BP Patient Position: Sitting)   Pulse 75   Temp 98.9 °F (37.2 °C)   Resp 20   Ht 5' 7\" (1.702 m)   Wt 117.9 kg (259 lb 14.8 oz)   SpO2 90%   BMI 40.71 kg/m²     General: No acute distress   HEENT oropharynx is clear   Neck: Supple, no lymphadenopathy, no JVD   Lungs: Clear to auscultation bilaterally   Cardiovascular: Regular rate and rhythm with normal S1 and S2   Abdomen: Soft, nontender, nondistended, normoactive bowel sounds   Extremities: No cyanosis clubbing or edema   Neuro: Nonfocal, A&O x3   Psych: Normal affect     Pertinent imaging studies:    Per EMR     Recent Labs     06/28/22  0341   WBC 7.5   HGB 14.2   HCT 43.1        Recent Labs     06/29/22  0406 06/28/22  0341    140   K 4.3 4.7   * 108   CO2 25 26   BUN 22* 25*   CREA 1.31* 1.52*   * 132*   CA 8.7 9.6     Recent Labs     06/28/22  0341   AP 68   TP 7.4   ALB 3.6   GLOB 3.8     No results for input(s): INR, PTP, APTT, INREXT in the last 72 hours. No results for input(s): FE, TIBC, PSAT, FERR in the last 72 hours.    No results for input(s): PH, PCO2, PO2 in the last 72 hours. No results for input(s): CPK, CKMB in the last 72 hours. No lab exists for component: TROPONINI  No components found for: [place]    Chronic Diagnoses:    Problem List as of 6/30/2022 Date Reviewed: 5/24/2019          Codes Class Noted - Resolved    Hypertensive urgency ICD-10-CM: I16.0  ICD-9-CM: 401.9  6/27/2022 - Present        Acute pancreatitis ICD-10-CM: K85.90  ICD-9-CM: 577.0  5/24/2019 - Present        Pancreatitis ICD-10-CM: K85.90  ICD-9-CM: 640.2  5/24/2019 - Present        Chest pain ICD-10-CM: R07.9  ICD-9-CM: 786.50  5/24/2019 - Present        Morbid obesity ([facility] [address] ICD-10-CM: E66.01  ICD-9-CM: 278.01  5/24/2019 - Present        Uncontrolled hypertension ICD-10-CM: I10  ICD-9-CM: 401.9  5/24/2019 - Present        Unstable angina ([facility] [address] ICD-10-CM: I20.0  ICD-9-CM: 411.1  8/19/2017 - Present              Time spent on discharge related activities today greater than 30 minutes.       Signed:  Afua David MD                 Hospitalist, Internal Medicine      Cc: Betzy Hernandes MD

## 2022-06-30 NOTE — DISCHARGE INSTRUCTIONS
Patient Education        Low Sodium Diet (2,000 Milligram): Care Instructions  Overview     Limiting sodium can be an important part of managing some health problems. The most common source of sodium is salt. People get most of the salt in their diet from canned, prepared, and packaged foods. Fast food and restaurant meals also are very high in sodium. Your doctor will probably limit your sodium to less than 2,000 milligrams (mg) a day. This limit counts all the sodium in prepared and packaged foods and any salt you add to your food. Follow-up care is a key part of your treatment and safety. Be sure to make and go to all appointments, and call your doctor if you are having problems. It's also a good idea to know your test results and keep a list of the medicines you take. How can you care for yourself at home? Read food labels  · Read labels on cans and food packages. The labels tell you how much sodium is in each serving. Make sure that you look at the serving size. If you eat more than the serving size, you have eaten more sodium. · Food labels also tell you the Percent Daily Value for sodium. Choose products with low Percent Daily Values for sodium. · Be aware that sodium can come in forms other than salt, including monosodium glutamate (MSG), sodium citrate, and sodium bicarbonate (baking soda). MSG is often added to Asian food. When you eat out, you can sometimes ask for food without MSG or added salt. Buy low-sodium foods  · Buy foods that are labeled \"unsalted\" (no salt added), \"sodium-free\" (less than 5 mg of sodium per serving), or \"low-sodium\" (140 mg or less of sodium per serving). Foods labeled \"reduced-sodium\" and \"light sodium\" may still have too much sodium. Be sure to read the label to see how much sodium you are getting. · Buy fresh vegetables, or frozen vegetables without added sauces. Buy low-sodium versions of canned vegetables, soups, and other canned goods.   Prepare low-sodium meals  · Cut back on the amount of salt you use in cooking. This will help you adjust to the taste. Do not add salt after cooking. One teaspoon of salt has about 2,300 mg of sodium. · Take the salt shaker off the table. · Flavor your food with garlic, lemon juice, onion, vinegar, herbs, and spices. Do not use soy sauce, lite soy sauce, steak sauce, onion salt, garlic salt, celery salt, or ketchup on your food. · Use low-sodium salad dressings, sauces, and ketchup. Or make your own salad dressings and sauces without adding salt. · Use less salt (or none) when recipes call for it. You can often use half the salt a recipe calls for without losing flavor. Other foods such as rice, pasta, and grains do not need added salt. · Rinse canned vegetables, and cook them in fresh water. This removes some--but not all--of the salt. · Avoid water that is naturally high in sodium or that has been treated with water softeners, which add sodium. If you buy bottled water, read the label and choose a sodium-free brand. Avoid high-sodium foods  · Avoid eating:  ? Smoked, cured, salted, and canned meat, fish, and poultry. ? Ham, patrick, hot dogs, and luncheon meats. ? Regular, hard, and processed cheese and regular peanut butter. ? Crackers with salted tops, and other salted snack foods such as pretzels, chips, and salted popcorn. ? Frozen prepared meals, unless labeled low-sodium. ? Canned and dried soups, broths, and bouillon, unless labeled sodium-free or low-sodium. ? Canned vegetables, unless labeled sodium-free or low-sodium. ? [place] fries, pizza, tacos, and other fast foods. ? Pickles, olives, ketchup, and other condiments, especially soy sauce, unless labeled sodium-free or low-sodium. Where can you learn more? Go to http://www.gray.com/  Enter V863 in the search box to learn more about \"Low Sodium Diet (2,000 Milligram): Care Instructions. \"  Current as of: September 8, 2021               Content Version: 13.2  © 3507-0673 [employer]. Care instructions adapted under license by [employer] (which disclaims liability or warranty for this information). If you have questions about a medical condition or this instruction, always ask your healthcare professional. [address] any warranty or liability for your use of this information. Please bring this form with you to show your primary care provider at your follow-up appointment. Primary care provider:  Angela Ayala MD    Discharging provider:  Christian An MD    You have been admitted to the hospital with the following diagnoses:  · Hypertensive urgency [I16.0]    FOLLOW-UP CARE RECOMMENDATIONS:      APPOINTMENTS:  · Follow-up with primary care provider, Angela Ayala MD  -  Please call [phone] shortly after discharge to set up an appointment to be seen in  1 week    · Cardiology as needed     FOLLOW-UP TESTS recommended: sleep study     PENDING TEST RESULTS:  At the time of your discharge the following test results are still pending: none   Please make sure you review these results with your outpatient follow-up provider(s). SYMPTOMS to watch for: chest pain, shortness of breath, fever, chills, nausea, vomiting, diarrhea, change in mentation, falling, weakness, bleeding. DIET/what to eat:  Cardiac Diet    ACTIVITY:  Activity as tolerated    What to do if new or unexpected symptoms occur? If you experience any of the above symptoms (or should other concerns or questions arise after discharge) please call your primary care physician. Return to the emergency room if you cannot get hold of your doctor. · It is very important that you keep your follow-up appointment(s). · Please bring discharge papers, medication list (and/or medication bottles) to your follow-up appointments for review by your outpatient provider(s).   · Please check the list of medications and be sure it includes every medication (even non-prescription medications) that your provider wants you to take. · It is important that you take the medication exactly as they are prescribed. · Keep your medication in the bottles provided by the pharmacist and keep a list of the medication names, dosages, and times to be taken in your wallet. · Do not take other medications without consulting your doctor. · If you have any questions about your medications or other instructions, please talk to your nurse or care provider before you leave the hospital.    I understand that if any problems occur once I am at home I am to contact my physician. These instructions were explained to me and I had the opportunity to ask questions.

## 2022-06-30 NOTE — PROGRESS NOTES
[facility] Hospitalist Service Progress Note    INTERVAL HISTORY  / Subjective:  He has been taken to stress portion of myocardial perfusion scan      Assessment / Plan:    Chest Pain and accelerated HTN  Dyslipidemia   --Cardiology consulted NOTES CAD which goes back 20+ years. He had LIMA to LAD bypass surgery since then. Most recent PCI was in the right coronary artery in 2017. --Nuclear perfusion scan ordered, stress portion was performed today and negative,  iBP uncontrolled today   --CTA Chest is negative   --Resume DAPT, Lopressor, Minoxidil, Pravastatin     DMII  --Controlled A1C 6 RANGE     Chief Complaint : Chest Pain        Objective:  Visit Vitals  BP (!) 145/65   Pulse 99   Temp 98.1 °F (36.7 °C)   Resp 18   Ht 5' 7\" (1.702 m)   Wt 117.9 kg (259 lb 14.8 oz)   SpO2 91%   BMI 40.71 kg/m²                 Physical Exam:  General: No acute distress, speaking in full sentences, no use of accessory muscles   HEENT: Pupils equal and reactive to light and accommodation, oropharynx is clear   Neck: Supple, no lymphadenopathy, no JVD   Lungs: Clear to auscultation bilaterally   Cardiovascular: Regular rate and rhythm with normal S1 and S2   Abdomen: Soft, nontender, nondistended, normoactive bowel sounds   Extremities: No cyanosis clubbing or edema   Neuro: Nonfocal, A&O x3   Psych: Normal affect     Intake and Output:  Date 06/29/22 0700 - 06/30/22 0659 06/30/22 0700 - 07/01/22 0659   Shift 1670-4009 7502-1141 24 Hour Total 2887-9288 3724-2227 24 Hour Total   INTAKE   I.V.(mL/kg/hr) 348.3(0.2)  348.3(0.1)        Cardene Volume 348. 3  348. 3      Shift Total(mL/kg) 348. 3(3)  348. 3(3)      OUTPUT   Urine(mL/kg/hr)  1100(0.8) 1100(0.4)        Urine Voided  1100 1100        Urine Occurrence(s)  3 x 3 x      Stool           Stool Occurrence(s) 1 x  1 x      Shift Total(mL/kg)  1100(9.3) 1100(9.3)      .3 -1100 -751.7      Weight (kg) 117.9 117.9 117.9 117.9 117.9 117.9       LAB:  No results displayed because visit has over 200 results. IMAGING:  CT HEAD WO CONT    Result Date: 6/27/2022  No change, negative for age. CT CHEST WO CONT    Result Date: 6/27/2022  No acute findings. XR CHEST PORT    Result Date: 6/27/2022  Cardiomegaly. Hyperinflation. No acute infiltrate. NUCLEAR CARDIAC STRESS TEST    Result Date: 6/29/2022  : 1. No definite evidence of ischemia and/or infarction. 2. LVEF equals 63%. Left ventricular wall motion and thickening is normal.       EKG:  No results found for this or any previous visit.           Severiano Whitlock MD  6/30/2022 7:31 PM

## 2022-06-30 NOTE — PROGRESS NOTES
OCCUPATIONAL THERAPY EVALUATION/DISCHARGE  Patient: Kalli Alvarado (66 y.o. male)  Date: 6/30/2022  Primary Diagnosis: Hypertensive urgency [I16.0]       Precautions: Fall       ASSESSMENT  Based on the objective data described below, the patient presents with no deficits compared to patient's PLOF before admission. Patient reported he has a walker and a wheelchair but does not use them to ambulate. Patient received seated in chair and agreeable to work with therapy. Patient denied any pains or concerns for returning home. Able to transfer sit <> stand x 2 with min A and ambulate to and from restroom with HHA, patient reports furniture surfing for support at baseline and good family support available. Patient confirmed he is moving at his baseline for mobility and transfers at this time. No acute OT services indicated at this time as patient is able to complete ADLs and functional mobility at mod I-min A level PTA. Will sign off at this time, can be reconsulted if a change in status occurs. Current Level of Function (ADLs/self-care): mod I-min A for ADLs functional mobility     Functional Outcome Measure: The patient scored 80/100 on the Barthel Index outcome measure which is indicative of 20% dependence in basic ADLs. Other factors to consider for discharge: All necessary DME at home      PLAN :  Recommend with staff: OOB 3x day for meals, functional mobility to bathroom for toileting     Recommendation for discharge: (in order for the patient to meet his/her long term goals)  No skilled occupational therapy/ follow up rehabilitation needs identified at this time. This discharge recommendation:  Has been made in collaboration with the attending provider and/or case management    IF patient discharges home will need the following DME: patient owns DME required for discharge       SUBJECTIVE:   Patient stated I have two daughters and two grandchildren.     OBJECTIVE DATA SUMMARY:   HISTORY:   Past Medical History:   Diagnosis Date    CAD (coronary artery disease) 08/21/2017    PCI and stent    Cancer ([facility] [address]     Prostate CA    Diabetes ([facility]     Hypertension     Myocardial infarct ([facility] 2000    Sleep apnea     wears C-PAP    Sleep disorder     Sleep Apnea C-PAP use at home      Past Surgical History:   Procedure Laterality Date    HX CORONARY STENT PLACEMENT      HX PROSTATECTOMY      HX TOTAL COLECTOMY  2012    CA CARDIAC SURG PROCEDURE UNLIST  2000    CABG       Prior Level of Function/Environment/Context: mod I/home and adaptive equipment  Expanded or extensive additional review of patient history:   Home Situation  Home Environment: Private residence  # Steps to Enter: 0  One/Two Story Residence: Two story  Lift Chair Available: Yes  Living Alone: No  Support Systems: Spouse/Significant Other,Child(eloy),Other Family Member(s)  Patient Expects to be Discharged to[de-identified] Home  Current DME Used/Available at Home: Grab bars,Shower chair,Walker, rolling,Wheelchair  Tub or Shower Type: Shower    Hand dominance: Right    EXAMINATION OF PERFORMANCE DEFICITS:  Cognitive/Behavioral Status:  Neurologic State: Alert; Appropriate for age  Orientation Level: Oriented X4  Cognition: Appropriate safety awareness; Follows commands             Skin: Appears intact    Edema: None noted    Hearing:       Vision/Perceptual:                                     Range of Motion:  BUE  AROM: Within functional limits  PROM: Within functional limits                      Strength:  BUE  Strength: Within functional limits                Coordination:  Coordination: Within functional limits  Fine Motor Skills-Upper: Left Intact; Right Intact    Gross Motor Skills-Upper: Left Intact; Right Intact    Tone & Sensation:  BUE  Tone: Normal  Sensation: Intact                      Balance:  Sitting: Intact; With support  Standing: Impaired  Standing - Static: Fair;Constant support  Standing - Dynamic : Fair;Constant support    Functional Mobility and Transfers for ADLs:  Bed Mobility:   Did not test, received in chair    Transfers:  Sit to Stand: Minimum assistance  Stand to Sit: Minimum assistance  Bed to Chair:  (Received in chair)    ADL Assessment:  Feeding: Independent (Infer from functional reach and UE access)    Oral Facial Hygiene/Grooming: Independent (Infer from functional reach and UE access)    Bathing: Modified independent (Infer from functional reach and UE access, grab bars)    Type of Bath: Patient refused    Upper Body Dressing: Modified independent (Infer from functional reach and UE access)    Lower Body Dressing: Modified independent; Adaptive equipment (Infer from functional reach and UE access)    Toileting: Modified independent; Adaptive equipment (grab bars)                ADL Intervention and task modifications:       Functional Measure:    Barthel Index:  Bathin  Bladder: 10  Bowels: 10  Groomin  Dressing: 10  Feeding: 10  Mobility: 10  Stairs: 0  Toilet Use: 10  Transfer (Bed to Chair and Back): 10  Total: 80/100      The Barthel ADL Index: Guidelines  1. The index should be used as a record of what a patient does, not as a record of what a patient could do. 2. The main aim is to establish degree of independence from any help, physical or verbal, however minor and for whatever reason. 3. The need for supervision renders the patient not independent. 4. A patient's performance should be established using the best available evidence. Asking the patient, friends/relatives and nurses are the usual sources, but direct observation and common sense are also important. However direct testing is not needed. 5. Usually the patient's performance over the preceding 24-48 hours is important, but occasionally longer periods will be relevant. 6. Middle categories imply that the patient supplies over 50 per cent of the effort. 7. Use of aids to be independent is allowed.     Score Interpretation (from [address] [address])    Independent 60-79 Minimally independent   40-59 Partially dependent   20-39 Very dependent   <20 Totally dependent     -Davian Shipley, Barthel, D.W. (0117). Functional evaluation: the Barthel Index. [address] ([address] (1997). The Barthel activities of daily living index: self-reporting versus actual performance in the old (> or = 75 years). Journal of [address] [place] [address] 457), [facility], J.EDUIN.[email or web address].F, Sarah Mead., Mabel Felty. (1999). Measuring the change in disability after inpatient rehabilitation; comparison of the responsiveness of the Barthel Index and Functional [place] Measure. Journal of Neurology, Neurosurgery, and Psychiatry, 66(4), 060-187. REAL Puckett.OSCAR, DAISY Sandoval, & Emerson Mendez M.A. (2004) Assessment of post-stroke quality of life in cost-effectiveness studies: The usefulness of the Barthel Index and the EuroQoL-5D. Quality of Life Research, 15, 465-78       Occupational Therapy Evaluation Charge Determination   History Examination Decision-Making   LOW Complexity : Brief history review  LOW Complexity : 1-3 performance deficits relating to physical, cognitive , or psychosocial skils that result in activity limitations and / or participation restrictions  LOW Complexity : No comorbidities that affect functional and no verbal or physical assistance needed to complete eval tasks       Based on the above components, the patient evaluation is determined to be of the following complexity level: LOW   Pain Rating:  Reported no pain    Activity Tolerance:   Fair and SpO2 stable on RA    After treatment patient left in no apparent distress:    Sitting in chair, Call bell within reach and Caregiver / family present    COMMUNICATION/EDUCATION:   The patients plan of care was discussed with: Physical therapist and Registered nurse.      Thank you for this referral.  MICKI Benítez  Time Calculation: 18 mins     Regarding student involvement in patient care:  A student participated in this treatment session. Per CMS Medicare statements and AOTA guidelines I certify that the following was true:  1. I was present and directly observed the entire session. 2. I made all skilled judgments and clinical decisions regarding care. 3. I am the practitioner responsible for assessment, treatment, and documentation.

## 2022-06-30 NOTE — PROGRESS NOTES
Bedside and Verbal shift change report given to Sandra (oncoming nurse) by Geri Raegan (offgoing nurse).  Report included the following information SBAR, Kardex, MAR, Accordion and Cardiac Rhythm SR.

## 2022-06-30 NOTE — PROGRESS NOTES
ELISA:  RUR: 10%     Disposition:  Home when medically stable       Home Health Orders received for SN/PT/OT. Patient is known to [facility] per this CM's conversation with patient's daughter. Preference given for same agency. CM sent referral to [facility] via [address] [place] [address],[facility]. CM awaiting response. Patient approved for servicing with this agency.     Samreen Leone, Yari-Ahmet, [address] [place] [address]

## 2022-06-30 NOTE — PROGRESS NOTES
Discharge instructions reviewed with patient, spouse, and daughter. VSS. All questions answers. Patient transported home with daughter.

## 2022-06-30 NOTE — PROGRESS NOTES
Physical Therapy 6/30/2022    Orders received and chart reviewed up to date. Pt evaluated by OT, report no acute PT needs. Pt received in chair, reports no needs and has DME at home. Will complete orders. Thank you.   Jeanie Calvert, PT, DPT

## 2022-07-01 ENCOUNTER — HOSPITAL ENCOUNTER (EMERGENCY)
Age: 87
Discharge: LEFT BEFORE DISCHARGE | End: 2022-07-01
Payer: MEDICARE

## 2022-07-01 ENCOUNTER — APPOINTMENT (OUTPATIENT)
Dept: GENERAL RADIOLOGY | Age: 87
End: 2022-07-01
Attending: EMERGENCY MEDICINE
Payer: MEDICARE

## 2022-07-01 VITALS
BODY MASS INDEX: 40.8 KG/M2 | RESPIRATION RATE: 16 BRPM | DIASTOLIC BLOOD PRESSURE: 42 MMHG | WEIGHT: 259.92 LBS | SYSTOLIC BLOOD PRESSURE: 162 MMHG | HEIGHT: 67 IN | TEMPERATURE: 98.4 F | HEART RATE: 77 BPM | OXYGEN SATURATION: 95 %

## 2022-07-01 LAB
ABDOMINAL PROX AORTA VEL: 105.2 CM/S
CELIAC EDV: 15.9 CM/S
CELIAC PSV: 169.8 CM/S
DIST AORTIC AP: 1.58 CM
LEFT KIDNEY LENGTH: 11.47 CM
LEFT KIDNEY WIDTH: 5.77 CM
LEFT RENAL DIST DIAS: 4.1 CM/S
LEFT RENAL DIST RAR: 0.93
LEFT RENAL DIST RI: 0.96
LEFT RENAL DIST SYS: 97.7 CM/S
LEFT RENAL LOWER PARENCHYMA MAX: 19 CM/S
LEFT RENAL LOWER PARENCHYMA MIN: 2 CM/S
LEFT RENAL LOWER PARENCHYMA RI: 0.89
LEFT RENAL MID DIAS: 6.6 CM/S
LEFT RENAL MID RAR: 0.83
LEFT RENAL MID RI: 0.92
LEFT RENAL MID SYS: 87.1 CM/S
LEFT RENAL MIDDLE PARENCHYMA MAX: 34.4 CM/S
LEFT RENAL MIDDLE PARENCHYMA MIN: 3.1 CM/S
LEFT RENAL MIDDLE PARENCHYMA RI: 0.91
LEFT RENAL PROX DIAS: 10.7 CM/S
LEFT RENAL PROX RAR: 0.9
LEFT RENAL PROX RI: 0.89
LEFT RENAL PROX SYS: 95.1 CM/S
LEFT RENAL UPPER PARENCHYMA MAX: 45.9 CM/S
LEFT RENAL UPPER PARENCHYMA MIN: 2.5 CM/S
LEFT RENAL UPPER PARENCHYMA RI: 0.95
PROX AORTIC AP: 1.66 CM
PROX SMA EDV: 16.2 CM/S
PROX SMA PSV: 259.1 CM/S
RIGHT KIDNEY LENGTH: 11.15 CM
RIGHT KIDNEY WIDTH: 4.69 CM
RIGHT RENAL DIST DIAS: 11.3 CM/S
RIGHT RENAL DIST RAR: 0.95
RIGHT RENAL DIST RI: 0.89
RIGHT RENAL DIST SYS: 100.2 CM/S
RIGHT RENAL LOWER PARENCHYMA MAX: 36.6 CM/S
RIGHT RENAL LOWER PARENCHYMA MIN: 6.4 CM/S
RIGHT RENAL LOWER PARENCHYMA RI: 0.83
RIGHT RENAL MID DIAS: 11.2 CM/S
RIGHT RENAL MID RAR: 0.83
RIGHT RENAL MID RI: 0.87
RIGHT RENAL MID SYS: 86.9 CM/S
RIGHT RENAL MIDDLE PARENCHYMA MAX: 35.5 CM/S
RIGHT RENAL MIDDLE PARENCHYMA MIN: 5.3 CM/S
RIGHT RENAL MIDDLE PARENCHYMA RI: 0.85
RIGHT RENAL PROX DIAS: 14.3 CM/S
RIGHT RENAL PROX RAR: 1.19
RIGHT RENAL PROX RI: 0.89
RIGHT RENAL PROX SYS: 124.8 CM/S
RIGHT RENAL UPPER PARENCHYMA MAX: 25.6 CM/S
RIGHT RENAL UPPER PARENCHYMA MIN: 4.2 CM/S
RIGHT RENAL UPPER PARENCHYMA RI: 0.84

## 2022-07-01 PROCEDURE — 75810000275 HC EMERGENCY DEPT VISIT NO LEVEL OF CARE

## 2022-07-01 NOTE — ED NOTES
Daughter with patient states patient now had BM and has not need to be seen, patient alert skin warm dry, patient denies c/o

## 2024-04-10 ENCOUNTER — TELEPHONE (OUTPATIENT)
Age: 89
End: 2024-04-10

## 2024-04-10 NOTE — TELEPHONE ENCOUNTER
Patient has a upcoming appointment on 6/17/2024, and needs a refill on Amlodipine 5 mg, Bicalutamide 50 mg Please send medications to [address] [place] [id number]  Hours: Open ? Closes 9?PM  Pharmacy: Open ? Closes 1:30?PM ? Reopens 2?PM · More hours  Updated by this business 5 weeks ago  Phone: [phone]

## 2024-04-11 NOTE — TELEPHONE ENCOUNTER
Spoke with the patient and he was okay with waiting until he established care with  for refills. [facility]

## 2024-04-15 RX ORDER — AMLODIPINE BESYLATE 5 MG/1
TABLET ORAL
Refills: 0 | OUTPATIENT
Start: 2024-04-15

## 2024-04-15 NOTE — TELEPHONE ENCOUNTER
Per Telephone Encounter on 04/11/2024 from Jocy Toledo MA pt is okay with waiting until establishes care with Dr. STEPHANIE Nation and [facility].     For Pharmacy Admin Tracking Only    Program: Medication Refill  Intervention Detail: Discontinued Rx: 2, reason: Duplicate Therapy  Time Spent (min): 5    Requested Prescriptions     Pending Prescriptions Disp Refills    amLODIPine (NORVASC) 5 MG tablet [Pharmacy Med Name: AMLODIPINE BESYLATE TABS 5MG]  0

## 2024-05-08 ENCOUNTER — OFFICE VISIT (OUTPATIENT)
Age: 89
End: 2024-05-08

## 2024-05-08 VITALS
TEMPERATURE: 98.8 F | OXYGEN SATURATION: 94 % | HEART RATE: 67 BPM | BODY MASS INDEX: 40.83 KG/M2 | WEIGHT: 260.14 LBS | HEIGHT: 67 IN | SYSTOLIC BLOOD PRESSURE: 140 MMHG | DIASTOLIC BLOOD PRESSURE: 68 MMHG

## 2024-05-08 DIAGNOSIS — Z76.0 MEDICATION REFILL: Primary | ICD-10-CM

## 2024-05-08 DIAGNOSIS — I10 PRIMARY HYPERTENSION: ICD-10-CM

## 2024-05-08 RX ORDER — AMLODIPINE BESYLATE 5 MG/1
5 TABLET ORAL DAILY
Qty: 30 TABLET | Refills: 3 | Status: SHIPPED | OUTPATIENT
Start: 2024-05-08

## 2024-05-08 NOTE — PATIENT INSTRUCTIONS
If symptoms worsens or fail to improve follow-up with PCP or ER.    Drink plenty of fluids.   Take medications as prescribed.   Follow-up with PCP regarding blood pressure.

## 2024-05-08 NOTE — PROGRESS NOTES
.refill  Maurice Allison (:  10/30/1930) is a 89 y.o. male,New patient, here for evaluation of the following chief complaint(s):  Medication Refill (PCP retired; has an appt with a new pcp in )      Assessment & Plan :    1. Medication refill  Patient appears well, asymptomatic. BP elevated, afebrile, SPO2 94% on room air. Lungs clear. No distress noted. Advise patient to follow-up with PCP for blood pressure.  Advise patient if he develops symptoms to follow-up with PCP or ER. Patient verbalized understanding, no questions or concerns at this time.   - amLODIPine (NORVASC) 5 MG tablet; Take 1 tablet by mouth daily  Dispense: 30 tablet; Refill: 3    2. Primary hypertension  Maurice Allison  has been given the following recommendations today due to her/ his  elevated BP reading: rescreen BP within a minimum of 2 weeks and lifestyle modifications to include: dietary sodium restriction and increase physical activity.  - amLODIPine (NORVASC) 5 MG tablet; Take 1 tablet by mouth daily  Dispense: 30 tablet; Refill: 3     1. Handout given with care instructions.     2. OTC for symptom management. Increase fluid intake.     3. Follow-up with PCP as needed.     4. Go to ED with development of any acute symptoms.      Follow-up   Follow-up with PCP or ER immediately for any new worsening or changes or if symptoms are not improving over the next 5-7 days.        Subjective :    Medication Refill       89 y.o. male presents with symptoms of elevated blood pressure. Patient denies dizziness and headaches. Denies SOB and CP. Patient denies smoking and drinking. Patient states he is here for medication refill for blood pressure.     Vitals:    24 1014   BP: (!) 140/68   Site: Left Upper Arm   Position: Sitting   Cuff Size: Large Adult   Pulse: 67   Temp: 98.8 °F (37.1 °C)   TempSrc: Oral   SpO2: 94%   Weight: 118 kg (260 lb 2.3 oz)   Height: 1.702 m (5' 7\")       No results found for this visit on 24.      Objective

## 2024-05-14 PROBLEM — E78.5 HYPERLIPIDEMIA: Status: ACTIVE | Noted: 2024-05-14

## 2024-05-14 PROBLEM — I10 HYPERTENSION: Status: ACTIVE | Noted: 2019-05-24

## 2024-05-14 PROBLEM — I16.0 HYPERTENSIVE URGENCY: Status: RESOLVED | Noted: 2022-06-27 | Resolved: 2024-05-14

## 2024-05-14 PROBLEM — I25.10 CAD (CORONARY ARTERY DISEASE): Status: ACTIVE | Noted: 2024-05-14

## 2024-05-14 PROBLEM — I50.40 COMBINED SYSTOLIC AND DIASTOLIC HEART FAILURE (HCC): Status: ACTIVE | Noted: 2024-05-14

## 2024-05-15 ENCOUNTER — TELEPHONE (OUTPATIENT)
Age: 89
End: 2024-05-15

## 2024-05-15 NOTE — TELEPHONE ENCOUNTER
I spoke with the patients daughter Trev per  request.  would like to establish care with the patient sooner than 06/17/24. A appointment has been made for 05/22/2024 at 2:00pm

## 2024-05-22 ENCOUNTER — OFFICE VISIT (OUTPATIENT)
Age: 89
End: 2024-05-22
Payer: MEDICARE

## 2024-05-22 VITALS
HEART RATE: 65 BPM | DIASTOLIC BLOOD PRESSURE: 80 MMHG | RESPIRATION RATE: 16 BRPM | BODY MASS INDEX: 41.09 KG/M2 | WEIGHT: 261.8 LBS | TEMPERATURE: 98.2 F | SYSTOLIC BLOOD PRESSURE: 146 MMHG | HEIGHT: 67 IN | OXYGEN SATURATION: 93 %

## 2024-05-22 DIAGNOSIS — I10 ESSENTIAL HYPERTENSION: Primary | ICD-10-CM

## 2024-05-22 DIAGNOSIS — K21.9 GASTROESOPHAGEAL REFLUX DISEASE, UNSPECIFIED WHETHER ESOPHAGITIS PRESENT: ICD-10-CM

## 2024-05-22 DIAGNOSIS — E66.01 OBESITY, CLASS III, BMI 40-49.9 (MORBID OBESITY) (HCC): ICD-10-CM

## 2024-05-22 DIAGNOSIS — E11.9 TYPE 2 DIABETES MELLITUS WITHOUT COMPLICATION, WITHOUT LONG-TERM CURRENT USE OF INSULIN (HCC): ICD-10-CM

## 2024-05-22 DIAGNOSIS — E78.5 HYPERLIPIDEMIA LDL GOAL <70: ICD-10-CM

## 2024-05-22 DIAGNOSIS — I50.42 CHRONIC COMBINED SYSTOLIC AND DIASTOLIC HEART FAILURE (HCC): ICD-10-CM

## 2024-05-22 PROCEDURE — 1123F ACP DISCUSS/DSCN MKR DOCD: CPT | Performed by: FAMILY MEDICINE

## 2024-05-22 PROCEDURE — 99204 OFFICE O/P NEW MOD 45 MIN: CPT | Performed by: FAMILY MEDICINE

## 2024-05-22 PROCEDURE — G8417 CALC BMI ABV UP PARAM F/U: HCPCS | Performed by: FAMILY MEDICINE

## 2024-05-22 PROCEDURE — G8427 DOCREV CUR MEDS BY ELIG CLIN: HCPCS | Performed by: FAMILY MEDICINE

## 2024-05-22 PROCEDURE — 1036F TOBACCO NON-USER: CPT | Performed by: FAMILY MEDICINE

## 2024-05-22 RX ORDER — PANTOPRAZOLE SODIUM 40 MG/1
40 TABLET, DELAYED RELEASE ORAL
Qty: 90 TABLET | Refills: 1 | Status: SHIPPED | OUTPATIENT
Start: 2024-05-22

## 2024-05-22 RX ORDER — AMLODIPINE BESYLATE 5 MG/1
5 TABLET ORAL DAILY
Qty: 90 TABLET | Refills: 1 | Status: SHIPPED | OUTPATIENT
Start: 2024-05-22 | End: 2024-11-18

## 2024-05-22 RX ORDER — DOCUSATE CALCIUM 240 MG
CAPSULE ORAL
COMMUNITY

## 2024-05-22 RX ORDER — PRAVASTATIN SODIUM 20 MG
20 TABLET ORAL NIGHTLY
Qty: 90 TABLET | Refills: 3 | Status: SHIPPED | OUTPATIENT
Start: 2024-05-22 | End: 2025-05-22

## 2024-05-22 SDOH — ECONOMIC STABILITY: FOOD INSECURITY: WITHIN THE PAST 12 MONTHS, YOU WORRIED THAT YOUR FOOD WOULD RUN OUT BEFORE YOU GOT MONEY TO BUY MORE.: NEVER TRUE

## 2024-05-22 SDOH — ECONOMIC STABILITY: HOUSING INSECURITY
IN THE LAST 12 MONTHS, WAS THERE A TIME WHEN YOU DID NOT HAVE A STEADY PLACE TO SLEEP OR SLEPT IN A SHELTER (INCLUDING NOW)?: NO

## 2024-05-22 SDOH — ECONOMIC STABILITY: FOOD INSECURITY: WITHIN THE PAST 12 MONTHS, THE FOOD YOU BOUGHT JUST DIDN'T LAST AND YOU DIDN'T HAVE MONEY TO GET MORE.: NEVER TRUE

## 2024-05-22 SDOH — ECONOMIC STABILITY: INCOME INSECURITY: HOW HARD IS IT FOR YOU TO PAY FOR THE VERY BASICS LIKE FOOD, HOUSING, MEDICAL CARE, AND HEATING?: NOT HARD AT ALL

## 2024-05-22 ASSESSMENT — PATIENT HEALTH QUESTIONNAIRE - PHQ9
1. LITTLE INTEREST OR PLEASURE IN DOING THINGS: NOT AT ALL
SUM OF ALL RESPONSES TO PHQ9 QUESTIONS 1 & 2: 0
SUM OF ALL RESPONSES TO PHQ QUESTIONS 1-9: 0
2. FEELING DOWN, DEPRESSED OR HOPELESS: NOT AT ALL
SUM OF ALL RESPONSES TO PHQ QUESTIONS 1-9: 0

## 2024-05-22 NOTE — ASSESSMENT & PLAN NOTE
Monitored by specialist- no acute findings meriting change in the plan.  Has cardiology Dr Brooks

## 2024-05-22 NOTE — PATIENT INSTRUCTIONS
Try to check your weight daily. Try to limit your salt and wear the compression stockings to help with the leg swelling.   You can start taking the medication for the heart burn/stomach pain.    Take one 81 mg aspirin with the plavix.   Check your blood pressure regularly.

## 2024-05-22 NOTE — PROGRESS NOTES
Rm:01    Chief Complaint   Patient presents with    New Patient          \"Have you been to the ER, urgent care clinic since your last visit?  Hospitalized since your last visit?\"    Yes - [place] on [address] - for refill    “Have you seen or consulted any other health care providers outside of [facility] since your last visit?”    Urologist, Oncologist, Hematologist, Cardiologist          Click Here for Release of Records Request   
polyuria.   Genitourinary: Negative.  Negative for frequency.   Musculoskeletal: Negative.  Negative for arthralgias, joint swelling and myalgias.   Skin: Negative.  Negative for color change, pallor and wound.   Neurological: Negative.  Negative for weakness and numbness.   Hematological:  Does not bruise/bleed easily.   Psychiatric/Behavioral:  Negative for dysphoric mood and sleep disturbance. The patient is not nervous/anxious.           Objective   Vitals:    05/22/24 1528   BP: (!) 146/80   Site: Left Upper Arm   Position: Sitting   Cuff Size: Large Adult   Pulse: 65   Resp: 16   Temp: 98.2 °F (36.8 °C)   TempSrc: Oral   SpO2: 93%   Weight: 118.8 kg (261 lb 12.8 oz)   Height: 1.702 m (5' 7\")      Physical Exam  Vitals and nursing note reviewed.   Constitutional:       General: He is not in acute distress.     Appearance: Normal appearance. He is obese. He is not ill-appearing or toxic-appearing.   HENT:      Head: Normocephalic and atraumatic.   Neck:      Thyroid: No thyroid mass, thyromegaly or thyroid tenderness.      Vascular: No carotid bruit.   Cardiovascular:      Rate and Rhythm: Normal rate and regular rhythm.      Pulses: Normal pulses.      Heart sounds: S1 normal and S2 normal. Murmur (systolic 2-3/6 TANNER) heard.      No friction rub. No gallop.      Comments: No abdominal bruit  Pulmonary:      Effort: Pulmonary effort is normal. No respiratory distress.      Breath sounds: Normal breath sounds. No wheezing, rhonchi or rales.   Abdominal:      General: Abdomen is flat. Bowel sounds are normal. There is no distension.      Palpations: Abdomen is soft.      Tenderness: There is no abdominal tenderness. There is no guarding or rebound.   Musculoskeletal:      Right lower leg: Edema (2+ pitting edema) present.      Left lower leg: Edema (2+ pitting edema) present.   Skin:     General: Skin is warm and dry.   Neurological:      Mental Status: He is alert.            An electronic signature was used to

## 2024-05-23 ASSESSMENT — ENCOUNTER SYMPTOMS
SHORTNESS OF BREATH: 0
RESPIRATORY NEGATIVE: 1
EYES NEGATIVE: 1
NAUSEA: 0
TROUBLE SWALLOWING: 0
COLOR CHANGE: 0
BLOOD IN STOOL: 0
DIARRHEA: 0
ABDOMINAL PAIN: 1
VOMITING: 0
COUGH: 0
ANAL BLEEDING: 0
CONSTIPATION: 0

## 2024-05-23 NOTE — ASSESSMENT & PLAN NOTE
Borderline controlled, continue current medications and discussed with pt BP goals.  Due to age, he has permissive hypertension.  It is unclear of ACE, ARB particularly with hx of HF.  He has seen nephrology in the past without diagnosis of CKD. Awaiting routine labs. Cont current medications. Pt with mild pitting edema bilaterally so will cont current dose of norvasc.

## 2024-05-23 NOTE — ASSESSMENT & PLAN NOTE
Asymptomatic, lifestyle modifications recommended and awaiting routine labs to evaluate control. Discussed with pt A1C goal of less than 8 due to age. Per pt [facility]

## 2024-05-25 LAB
ALBUMIN SERPL-MCNC: 4.3 G/DL (ref 3.6–4.6)
ALBUMIN/CREAT UR: 11 MG/G CREAT (ref 0–29)
ALBUMIN/GLOB SERPL: 1.8 {RATIO} (ref 1.2–2.2)
ALP SERPL-CCNC: 83 IU/L (ref 44–121)
ALT SERPL-CCNC: 10 IU/L (ref 0–44)
AST SERPL-CCNC: 15 IU/L (ref 0–40)
BASOPHILS # BLD AUTO: 0 X10E3/UL (ref 0–0.2)
BASOPHILS NFR BLD AUTO: 0 %
BILIRUB SERPL-MCNC: 0.5 MG/DL (ref 0–1.2)
BUN SERPL-MCNC: 23 MG/DL (ref 10–36)
BUN/CREAT SERPL: 16 (ref 10–24)
CALCIUM SERPL-MCNC: 9.5 MG/DL (ref 8.6–10.2)
CHLORIDE SERPL-SCNC: 105 MMOL/L (ref 96–106)
CHOLEST SERPL-MCNC: 161 MG/DL (ref 100–199)
CO2 SERPL-SCNC: 24 MMOL/L (ref 20–29)
CREAT SERPL-MCNC: 1.46 MG/DL (ref 0.76–1.27)
CREAT UR-MCNC: 276.2 MG/DL
EOSINOPHIL # BLD AUTO: 0.2 X10E3/UL (ref 0–0.4)
EOSINOPHIL NFR BLD AUTO: 5 %
ERYTHROCYTE [DISTWIDTH] IN BLOOD BY AUTOMATED COUNT: 15.6 % (ref 11.6–15.4)
GLOBULIN SER CALC-MCNC: 2.4 G/DL (ref 1.5–4.5)
GLUCOSE SERPL-MCNC: 100 MG/DL (ref 70–99)
HBA1C MFR BLD: 6.2 % (ref 4.8–5.6)
HCT VFR BLD AUTO: 40.8 % (ref 37.5–51)
HDLC SERPL-MCNC: 45 MG/DL
HGB BLD-MCNC: 13.2 G/DL (ref 13–17.7)
IMM GRANULOCYTES # BLD AUTO: 0 X10E3/UL (ref 0–0.1)
IMM GRANULOCYTES NFR BLD AUTO: 0 %
LDLC SERPL CALC-MCNC: 98 MG/DL (ref 0–99)
LYMPHOCYTES # BLD AUTO: 1.5 X10E3/UL (ref 0.7–3.1)
LYMPHOCYTES NFR BLD AUTO: 44 %
MCH RBC QN AUTO: 26.5 PG (ref 26.6–33)
MCHC RBC AUTO-ENTMCNC: 32.4 G/DL (ref 31.5–35.7)
MCV RBC AUTO: 82 FL (ref 79–97)
MICROALBUMIN UR-MCNC: 30.7 UG/ML
MONOCYTES # BLD AUTO: 0.3 X10E3/UL (ref 0.1–0.9)
MONOCYTES NFR BLD AUTO: 10 %
NEUTROPHILS # BLD AUTO: 1.4 X10E3/UL (ref 1.4–7)
NEUTROPHILS NFR BLD AUTO: 41 %
PLATELET # BLD AUTO: 189 X10E3/UL (ref 150–450)
POTASSIUM SERPL-SCNC: 4.4 MMOL/L (ref 3.5–5.2)
PROT SERPL-MCNC: 6.7 G/DL (ref 6–8.5)
RBC # BLD AUTO: 4.98 X10E6/UL (ref 4.14–5.8)
SODIUM SERPL-SCNC: 143 MMOL/L (ref 134–144)
TRIGL SERPL-MCNC: 95 MG/DL (ref 0–149)
TSH SERPL DL<=0.005 MIU/L-ACNC: 2.68 UIU/ML (ref 0.45–4.5)
VLDLC SERPL CALC-MCNC: 18 MG/DL (ref 5–40)
WBC # BLD AUTO: 3.4 X10E3/UL (ref 3.4–10.8)

## 2024-05-27 PROBLEM — N18.31 CKD STAGE 3A, GFR 45-59 ML/MIN (HCC): Status: ACTIVE | Noted: 2024-05-27

## 2024-05-28 ENCOUNTER — TELEPHONE (OUTPATIENT)
Age: 89
End: 2024-05-28

## 2024-05-28 NOTE — TELEPHONE ENCOUNTER
I have attempted without success to contact this patient by phone to discuss lab results per Dr. Nation's request.  stated that the labs are within limits besides the A1C and kidney function. Dr. Nation would like to start the patient on Hydralazine 100mg three times a day to help with the blood pressure as well. If the patient agrees then the medication will be sent to the pharmacy. A1C is 6.2 which is considered prediabetic.

## 2024-05-28 NOTE — TELEPHONE ENCOUNTER
----- Message from Cinthya Nation MD sent at 5/27/2024  8:47 PM EDT -----  Call:   all of your labs are within acceptable limits except for the kidney function and the A1C.  The A1C is currently at 6.2 which is considered prediabetic.  However, if you have a history of an A1C of 6.5 or greater then you have a history of diabetes for which you are currently controlling with your diet. Keep up the great work and continue a low carb diet.  Regarding the kidney function, you have chronic kidney disease which is currently stable and at your baseline.  With that, it is not currently recommended to start a medication called lisinopril or losartan as that can elevate the potassium with chronic kidney disease.  So if possible, try to restart the hydralazine 100 mg three times daily and continue to monitor your blood pressure at home.  Continue with the recommendations as discussed at your appt. Keep your routinely scheduled appts. Have a great start to the week.

## 2024-05-30 ENCOUNTER — TELEPHONE (OUTPATIENT)
Age: 89
End: 2024-05-30

## 2024-05-30 DIAGNOSIS — K21.9 GASTROESOPHAGEAL REFLUX DISEASE, UNSPECIFIED WHETHER ESOPHAGITIS PRESENT: Primary | ICD-10-CM

## 2024-05-30 RX ORDER — FAMOTIDINE 10 MG
10 TABLET ORAL 2 TIMES DAILY
Qty: 60 TABLET | Refills: 3 | Status: SHIPPED | OUTPATIENT
Start: 2024-05-30

## 2024-05-30 NOTE — TELEPHONE ENCOUNTER
Please call pt and family to advise that I reviewed the medication side effects and all medications of that class (proton pump inhibitors) may cause headaches.  We can try another medication called pepcid which is a different class of medications at a lower dose and see if that will help without the side effects.  This will be sent to the local pharmacy.  Let me know if there are any side effects or concerns. Hope this helps. Thanks.

## 2024-05-30 NOTE — TELEPHONE ENCOUNTER
Spoke with the patient daughter Gill verifying two identifiers. Gill has been made aware of 's recommendations to try a different medication called Pepcid that was send over to the local pharmacy for the patient. No further questions or concerns at this time.

## 2024-05-30 NOTE — TELEPHONE ENCOUNTER
The patient is having  Headaches from the Pantoprazole 40 mg.I spoke with his Daughter .@[phone].

## 2024-05-31 ENCOUNTER — TELEPHONE (OUTPATIENT)
Age: 89
End: 2024-05-31

## 2024-05-31 NOTE — TELEPHONE ENCOUNTER
Please call pt and advise that the pepcid is a histamine blocker medication that helps with acid reflux.  Some of the main side effects are: 1-2% diarrhea, constipation, and dizziness with 5% risk of headaches.  In patients over the age of 60 with kidney insufficiency or chronic kidney disease, there is an increased risk of confusion, delirium (change in mental status), and agitation.  These changes are typically dose related and he is currently on the lowest dose of the medication.  Another alternative is to try a lower dose of protonix or nexium. Hope that helps.

## 2024-05-31 NOTE — TELEPHONE ENCOUNTER
PT daughter and pt is asking for more information on the medication you want to start him on like side affects please give pt a call back on daughters number [phone]

## 2024-06-03 NOTE — TELEPHONE ENCOUNTER
I spoke with the patient daughter Gill in regards to the side effects of the medication famotidine(PEPCID). Gill stated that she will inform the patient and will pick the medication up from the pharmacy. No further questions or concerns at this time.

## 2024-06-17 ENCOUNTER — OFFICE VISIT (OUTPATIENT)
Age: 89
End: 2024-06-17
Payer: MEDICARE

## 2024-06-17 VITALS
DIASTOLIC BLOOD PRESSURE: 78 MMHG | RESPIRATION RATE: 18 BRPM | WEIGHT: 258.8 LBS | BODY MASS INDEX: 40.62 KG/M2 | OXYGEN SATURATION: 94 % | SYSTOLIC BLOOD PRESSURE: 150 MMHG | HEART RATE: 68 BPM | TEMPERATURE: 98.2 F | HEIGHT: 67 IN

## 2024-06-17 DIAGNOSIS — I10 ESSENTIAL HYPERTENSION: ICD-10-CM

## 2024-06-17 DIAGNOSIS — N18.31 CKD STAGE 3A, GFR 45-59 ML/MIN (HCC): ICD-10-CM

## 2024-06-17 DIAGNOSIS — I10 ESSENTIAL HYPERTENSION: Primary | ICD-10-CM

## 2024-06-17 PROCEDURE — G8417 CALC BMI ABV UP PARAM F/U: HCPCS | Performed by: FAMILY MEDICINE

## 2024-06-17 PROCEDURE — 99214 OFFICE O/P EST MOD 30 MIN: CPT | Performed by: FAMILY MEDICINE

## 2024-06-17 PROCEDURE — 1036F TOBACCO NON-USER: CPT | Performed by: FAMILY MEDICINE

## 2024-06-17 PROCEDURE — 1123F ACP DISCUSS/DSCN MKR DOCD: CPT | Performed by: FAMILY MEDICINE

## 2024-06-17 PROCEDURE — G8427 DOCREV CUR MEDS BY ELIG CLIN: HCPCS | Performed by: FAMILY MEDICINE

## 2024-06-17 RX ORDER — CARVEDILOL 3.12 MG/1
3.12 TABLET ORAL 2 TIMES DAILY
Qty: 60 TABLET | Refills: 1 | Status: SHIPPED | OUTPATIENT
Start: 2024-06-17 | End: 2024-08-16

## 2024-06-17 SDOH — ECONOMIC STABILITY: FOOD INSECURITY: WITHIN THE PAST 12 MONTHS, THE FOOD YOU BOUGHT JUST DIDN'T LAST AND YOU DIDN'T HAVE MONEY TO GET MORE.: NEVER TRUE

## 2024-06-17 SDOH — ECONOMIC STABILITY: INCOME INSECURITY: HOW HARD IS IT FOR YOU TO PAY FOR THE VERY BASICS LIKE FOOD, HOUSING, MEDICAL CARE, AND HEATING?: NOT HARD AT ALL

## 2024-06-17 SDOH — ECONOMIC STABILITY: FOOD INSECURITY: WITHIN THE PAST 12 MONTHS, YOU WORRIED THAT YOUR FOOD WOULD RUN OUT BEFORE YOU GOT MONEY TO BUY MORE.: NEVER TRUE

## 2024-06-17 ASSESSMENT — ENCOUNTER SYMPTOMS
COUGH: 0
GASTROINTESTINAL NEGATIVE: 1
RESPIRATORY NEGATIVE: 1
EYES NEGATIVE: 1
SHORTNESS OF BREATH: 0
COLOR CHANGE: 0

## 2024-06-17 ASSESSMENT — PATIENT HEALTH QUESTIONNAIRE - PHQ9
SUM OF ALL RESPONSES TO PHQ QUESTIONS 1-9: 0
1. LITTLE INTEREST OR PLEASURE IN DOING THINGS: NOT AT ALL
2. FEELING DOWN, DEPRESSED OR HOPELESS: NOT AT ALL
SUM OF ALL RESPONSES TO PHQ QUESTIONS 1-9: 0
SUM OF ALL RESPONSES TO PHQ9 QUESTIONS 1 & 2: 0
SUM OF ALL RESPONSES TO PHQ QUESTIONS 1-9: 0
SUM OF ALL RESPONSES TO PHQ QUESTIONS 1-9: 0

## 2024-06-17 NOTE — PROGRESS NOTES
Rm:01    Chief Complaint   Patient presents with    Hypertension    Headache   Constant headaches and stopped taking hydralazine        \"Have you been to the ER, urgent care clinic since your last visit?  Hospitalized since your last visit?\"    NONO    “Have you seen or consulted any other health care providers outside of [facility] since your last visit?”    NO            Click Here for Release of Records Request   
  Musculoskeletal:      Right lower leg: Edema (1+ pitting edema) present.      Left lower leg: Edema (1+ pitting edema) present.   Skin:     General: Skin is warm and dry.   Neurological:      General: No focal deficit present.      Mental Status: He is alert and oriented to person, place, and time. Mental status is at baseline.            An electronic signature was used to authenticate this note.    --Kaiser Nation MD

## 2024-06-17 NOTE — PATIENT INSTRUCTIONS
You can stop the hydralazine.   You can start the low dose carvedilol to help with the blood pressure. Check your blood pressure and pulse regularly at home.  Make sure to change positions slowly to prevent rapid changes in your blood pressure.   Try to use your CPAP more regularly   Try to use the compression stockings to help with the swelling.   
provider        Activity: Activity as Tolerated        Dietary:   Continue your diet as tolerated. Protein is a key nutrient in helping to repair damaged tissue and promote new tissue growth. Good sources of protein include milk, yogurt, cheese, fish, lean meat and beans. If you are DIABETIC, having diabetes can make it hard for wounds to heal. Try to keep your blood sugar within it's target range. Limit Sodium, Alcohol and Sugar. Pain:   Please Note some pain, drainage and/or bleeding might be expected after seeing the provider. TO HELP ALLEVIATE PAIN WE RECOMMEND THE FOLLOWING  Elevate the affected limb. Use over the counter medications as permitted by your family doctor. For Persistent Pain not relieved by the above interventions, please notify your family doctor. Return Appointment:  [x] Return Appointment: With DR Vivi Sue  in 2 Week(s)  [x] Wound and dressing supply provider: [facility]  [] ECF or Home Healthcare:  [] Wound Assessment:         [] Physician or NP scheduled for Wound Assessment:   [] Orders placed during your visit:     **CALL YOUR FAMILY DOCTOR ABOUT YOUR LEFT WRIST DROP - ULISSES Weiner**        : Gerardo Johnston      Electronically signed by Ifrah Hartman RN on 6/22/2023 at 4:35 PM          [facility] Information: Should you experience any significant changes in your wound(s) or have questions about your wound care, please contact the [address] [place] [address] at [address] 8:00 am - 4:30 pm and Friday 8:00 am - 12:30 pm.  If you need help with your wound outside these hours and cannot wait until we are again available, contact your PCP or go to the hospital emergency room. PLEASE NOTE: IF YOU ARE UNABLE TO OBTAIN WOUND SUPPLIES, CONTINUE TO USE THE SUPPLIES YOU HAVE AVAILABLE UNTIL YOU ARE ABLE TO REACH US. IT IS MOST IMPORTANT TO KEEP THE WOUND COVERED AT ALL TIMES.      Physician Signature:_______________________     Date: ___________ Time:

## 2024-06-18 RX ORDER — CARVEDILOL 3.12 MG/1
3.12 TABLET ORAL 2 TIMES DAILY
Qty: 180 TABLET | OUTPATIENT
Start: 2024-06-18

## 2024-06-18 NOTE — TELEPHONE ENCOUNTER
Will continue with current quantity as since his medication may be changed at the upcoming appt depending on his response to the current medication.  Once on a long term dose will refill for 90 days. Close clinical follow up advised for 1 mo.

## 2024-06-18 NOTE — TELEPHONE ENCOUNTER
[facility] #[id number] is requesting a 90 day supply via fax on behalf of the pt.     Last appointment: 06/17/2024 MD Nation   Next appointment: 08/23/2024 MD Nation   Previous refill encounter(s):   06/17/2024 [facility] #60 with 1 refill     For Pharmacy Admin Tracking Only    Program: Medication Refill  Intervention Detail: New Rx: 1, reason: Patient Preference  Time Spent (min): 5    Requested Prescriptions     Pending Prescriptions Disp Refills    carvedilol (COREG) 3.125 MG tablet [Pharmacy Med Name: CARVEDILOL 3.125MG TABLETS] 180 tablet      Sig: TAKE 1 TABLET BY MOUTH TWICE DAILY

## 2024-07-03 ENCOUNTER — TELEPHONE (OUTPATIENT)
Age: 89
End: 2024-07-03

## 2024-07-03 NOTE — TELEPHONE ENCOUNTER
Spoke with patients daughter, She stated that she has been monitoring pt's BP twice a day. She stated that pts BP has been elevated. His BP last night was 176/85 and pt complains of dizziness. Advised family to go to urgent care or ER for evaluation. Advised to call cardiology for follow-up and med adjustments. Also advised to make follow-up appointment for PCP, pt cancelled appointment was cancelled. Family agreed and voiced understanding.     Leatha Lay LPN

## 2024-07-04 NOTE — TELEPHONE ENCOUNTER
Agree with recommendations for ER/urgent care evaluation for concern for hypertensive emergency with elev BP with dizziness.  Pt BP complicated with hx of CKD and CAD s/p CABG.  Discontinued hydralazine due to concern for SE.

## 2024-08-20 ENCOUNTER — OFFICE VISIT (OUTPATIENT)
Age: 89
End: 2024-08-20
Payer: MEDICARE

## 2024-08-20 VITALS
HEIGHT: 67 IN | OXYGEN SATURATION: 95 % | WEIGHT: 265.2 LBS | BODY MASS INDEX: 41.62 KG/M2 | DIASTOLIC BLOOD PRESSURE: 76 MMHG | HEART RATE: 57 BPM | RESPIRATION RATE: 16 BRPM | SYSTOLIC BLOOD PRESSURE: 152 MMHG | TEMPERATURE: 97.5 F

## 2024-08-20 DIAGNOSIS — Z00.00 MEDICARE ANNUAL WELLNESS VISIT, SUBSEQUENT: Primary | ICD-10-CM

## 2024-08-20 DIAGNOSIS — I10 ESSENTIAL HYPERTENSION: ICD-10-CM

## 2024-08-20 PROCEDURE — G8427 DOCREV CUR MEDS BY ELIG CLIN: HCPCS | Performed by: FAMILY MEDICINE

## 2024-08-20 PROCEDURE — 1036F TOBACCO NON-USER: CPT | Performed by: FAMILY MEDICINE

## 2024-08-20 PROCEDURE — 99213 OFFICE O/P EST LOW 20 MIN: CPT | Performed by: FAMILY MEDICINE

## 2024-08-20 PROCEDURE — G0439 PPPS, SUBSEQ VISIT: HCPCS | Performed by: FAMILY MEDICINE

## 2024-08-20 PROCEDURE — G8417 CALC BMI ABV UP PARAM F/U: HCPCS | Performed by: FAMILY MEDICINE

## 2024-08-20 PROCEDURE — 1123F ACP DISCUSS/DSCN MKR DOCD: CPT | Performed by: FAMILY MEDICINE

## 2024-08-20 RX ORDER — CARVEDILOL 12.5 MG/1
TABLET ORAL
COMMUNITY
Start: 2024-07-16

## 2024-08-20 ASSESSMENT — PATIENT HEALTH QUESTIONNAIRE - PHQ9
SUM OF ALL RESPONSES TO PHQ QUESTIONS 1-9: 0
SUM OF ALL RESPONSES TO PHQ9 QUESTIONS 1 & 2: 0
2. FEELING DOWN, DEPRESSED OR HOPELESS: NOT AT ALL
SUM OF ALL RESPONSES TO PHQ QUESTIONS 1-9: 0
1. LITTLE INTEREST OR PLEASURE IN DOING THINGS: NOT AT ALL
SUM OF ALL RESPONSES TO PHQ QUESTIONS 1-9: 0
SUM OF ALL RESPONSES TO PHQ QUESTIONS 1-9: 0

## 2024-08-20 ASSESSMENT — LIFESTYLE VARIABLES
HOW MANY STANDARD DRINKS CONTAINING ALCOHOL DO YOU HAVE ON A TYPICAL DAY: PATIENT DOES NOT DRINK
HOW OFTEN DO YOU HAVE A DRINK CONTAINING ALCOHOL: NEVER

## 2024-08-20 NOTE — PROGRESS NOTES
RM : 02    Chief Complaint   Patient presents with    Hypertension       Vitals:    08/20/24 1408   BP: (!) 183/81   Pulse: 57   Resp: 16   Temp: 97.5 °F (36.4 °C)   SpO2: 95%         \"Have you been to the ER, urgent care clinic since your last visit?  Hospitalized since your last visit?\"    NO    “Have you seen or consulted any other health care providers outside of [facility] since your last visit?”    NO            Click Here for Release of Records Request      AVS  education, follow up, and recommendations provided and addressed with patient.   
abdominal tenderness. There is no guarding or rebound.   Musculoskeletal:      Right lower leg: Edema (1-2+ pitting edema) present.      Left lower leg: Edema (1-2+ pitting edema) present.   Neurological:      Mental Status: He is alert.   Psychiatric:         Mood and Affect: Mood normal.         Behavior: Behavior normal.         Thought Content: Thought content normal.         Judgment: Judgment normal.                 Allergies   Allergen Reactions    Iodine Other (See Comments)     \"it gives me a headache\"    Hydralazine Headaches     Prior to Visit Medications    Medication Sig Taking? Authorizing Provider   Homeopathic Products (LEG CRAMPS PO) Take by mouth Yes Sophie Short MD   carvedilol (COREG) 12.5 MG tablet  Yes Sophie Short MD   amLODIPine (NORVASC) 5 MG tablet Take 1 tablet by mouth daily Yes Cinthya Nation MD   pravastatin (PRAVACHOL) 20 MG tablet Take 1 tablet by mouth at bedtime Yes Cinthya Nation MD   ascorbic acid (VITAMIN C) 500 MG tablet Take 1 tablet by mouth daily Yes Automatic Reconciliation, Ar   aspirin 81 MG EC tablet Take 1 tablet by mouth daily Yes Automatic Reconciliation, Ar   bicalutamide (CASODEX) 50 MG chemo tablet Take 1 tablet by mouth daily Yes Automatic Reconciliation, Ar   clopidogrel (PLAVIX) 75 MG tablet Take 1 tablet by mouth daily Yes Automatic Reconciliation, Ar   docusate (COLACE, DULCOLAX) 100 MG CAPS Take 100 mg by mouth Yes Automatic Reconciliation, Ar   furosemide (LASIX) 40 MG tablet Take 0.5 tablets by mouth 2 times daily 3 times a week Yes Automatic Reconciliation, Ar   famotidine (PEPCID AC) 10 MG tablet Take 1 tablet by mouth 2 times daily  Patient not taking: Reported on 6/17/2024  Cinthya Nation MD   docusate calcium (SURFAK) 240 MG capsule Take by mouth  Patient not taking: Reported on 6/17/2024  ProviderSophie MD CareTeam (Including outside providers/suppliers regularly involved in providing care):   Patient

## 2024-08-20 NOTE — PATIENT INSTRUCTIONS
Learning About Being Active as an Older Adult  Why is being active important as you get older?     Being active is one of the best things you can do for your health. And it's never too late to start. Being active--or getting active, if you aren't already--has definite benefits. It can:  Give you more energy,  Keep your mind sharp.  Improve balance to reduce your risk of falls.  Help you manage chronic illness with fewer medicines.  No matter how old you are, how fit you are, or what health problems you have, there is a form of activity that will work for you. And the more physical activity you can do, the better your overall health will be.  What kinds of activity can help you stay healthy?  Being more active will make your daily activities easier. Physical activity includes planned exercise and things you do in daily life. There are four types of activity:  Aerobic.  Doing aerobic activity makes your heart and lungs strong.  Includes walking, dancing, and gardening.  Aim for at least 2½ hours spread throughout the week.  It improves your energy and can help you sleep better.  Muscle-strengthening.  This type of activity can help maintain muscle and strengthen bones.  Includes climbing stairs, using resistance bands, and lifting or carrying heavy loads.  Aim for at least twice a week.  It can help protect the knees and other joints.  Stretching.  Stretching gives you better range of motion in joints and muscles.  Includes upper arm stretches, calf stretches, and gentle yoga.  Aim for at least twice a week, preferably after your muscles are warmed up from other activities.  It can help you function better in daily life.  Balancing.  This helps you stay coordinated and have good posture.  Includes heel-to-toe walking, august chi, and certain types of yoga.  Aim for at least 3 days a week.  It can reduce your risk of falling.  Even if you have a hard time meeting the recommendations, it's better to be more active

## 2024-08-21 NOTE — ASSESSMENT & PLAN NOTE
Borderline controlled, continue current medications.  Pulse currently at 57 and currently tolerating coreg 12.5 mg BID.  Pt to discuss with cardiology.  Discussed with pt BP goals of at least 140/90 enabling permissive HTN due to his age.  He has since also improved his CPAP compliance which is also likely improving his BP control as well.  He is doing well with a low salt diet and compression socks.  Cont current dose of norvasc.  Cont home BP monitoring.

## 2024-10-29 ENCOUNTER — OFFICE VISIT (OUTPATIENT)
Age: 89
End: 2024-10-29

## 2024-10-29 VITALS
SYSTOLIC BLOOD PRESSURE: 159 MMHG | DIASTOLIC BLOOD PRESSURE: 95 MMHG | OXYGEN SATURATION: 93 % | HEART RATE: 69 BPM | BODY MASS INDEX: 40.1 KG/M2 | WEIGHT: 256 LBS | TEMPERATURE: 97.9 F

## 2024-10-29 DIAGNOSIS — U07.1 COVID-19: Primary | ICD-10-CM

## 2024-10-29 DIAGNOSIS — R52 BODY ACHES: ICD-10-CM

## 2024-10-29 LAB
INFLUENZA A ANTIGEN, POC: NEGATIVE
INFLUENZA B ANTIGEN, POC: NEGATIVE
Lab: ABNORMAL
PERFORMING INSTRUMENT: ABNORMAL
QC PASS/FAIL: ABNORMAL
SARS-COV-2, POC: DETECTED

## 2024-10-29 RX ORDER — ACETAMINOPHEN 500 MG
500 TABLET ORAL EVERY 6 HOURS PRN
COMMUNITY

## 2024-10-29 RX ORDER — CETIRIZINE HYDROCHLORIDE 10 MG/1
10 TABLET ORAL DAILY
COMMUNITY

## 2024-10-29 ASSESSMENT — ENCOUNTER SYMPTOMS: COUGH: 1

## 2024-10-29 NOTE — PROGRESS NOTES
Maurice Allison (:  10/30/1930) is a 89 y.o. male,Established patient, here for evaluation of the following chief complaint(s):  Cough (Pt took delsym cough medicine sun night and is still coughing, body aches, cold sweats//)      Assessment & Plan :  Visit Diagnoses and Associated Orders       COVID-19    -  Primary    nirmatrelvir/ritonavir (PAXLOVID) 10 x 150 MG & 10 x 100MG TBPK [730397]           Body aches        POCT COVID-19, Antigen [BMA423 Custom]      POCT Influenza A/B Antigen [61580 Custom]           ORDERS WITHOUT AN ASSOCIATED DIAGNOSIS    cetirizine (ZYRTEC) 10 MG tablet [9506]      acetaminophen (TYLENOL) 500 MG tablet [102]              You are positive for COVID 19 today, negative for influenza  His vital signs are stable, physical exam is benign, I have low suspicion for secondary bacterial infection at this time   SpO2 is at 93% today, it appears this is at his baseline based on previous visits  Patient denies any shortness of breath or respiratory distress  Due to your risk factors you are a candidate for antiviral therapy  Paxlovid 2x daily for 5 days, will renally dose based on recent kidney function studies  Treat symptomatically: OTC tylenol/ibuprofen for aches, pains, fevers, chills  Hot tea with honey, throat lozenges, saline sinus rinses  Lots of fluids, plenty of rest  Strict 5 day quarantine from onset of symptoms, continue to wear a well fitting mask for an additional 5 days beyond that  Go to the ED with any severe shortness of breath, chest pain, or if you are unable to keep down food and fluids       Subjective :    Cough         89 y.o. male presents with symptoms of bodyaches, sweats/chills, fatigue and malaise since yesterday.  He states his wife is currently admitted to the hospital with COVID.  He tested himself for COVID at home yesterday and was negative.  He denies any measured fevers.  Denies ear pain.  He does report some nasal congestion, postnasal drip, runny nose and

## 2024-10-29 NOTE — PATIENT INSTRUCTIONS
You are positive for COVID 19 today, negative for influenza  Due to your risk factors you are a candidate for antiviral therapy  Paxlovid 2x daily for 5 days  Treat symptomatically: OTC tylenol/ibuprofen for aches, pains, fevers, chills  Hot tea with honey, throat lozenges, saline sinus rinses  Lots of fluids, plenty of rest  Strict 5 day quarantine from onset of symptoms, continue to wear a well fitting mask for an additional 5 days beyond that  Go to the ED with any severe shortness of breath, chest pain, or if you are unable to keep down food and fluids

## 2024-11-05 ENCOUNTER — TELEPHONE (OUTPATIENT)
Age: 89
End: 2024-11-05

## 2024-11-05 NOTE — TELEPHONE ENCOUNTER
Patient  daughter is requesting to know what else could the patient take for cough. Patient has finished the Plaxlavid and needs something else.

## 2024-11-05 NOTE — TELEPHONE ENCOUNTER
Please call and advise that he can try coricidin HBP, plain mucinex, or delsym for the cough.  Happy belated birthday as well! Feel better soon.

## 2024-11-05 NOTE — TELEPHONE ENCOUNTER
I spoke with the patient daughter Gill and informed her of 's recommendations. She voiced an understanding and will purchase the OTC medications for the patient. No further questions or concerns at this time.

## 2024-11-14 ENCOUNTER — OFFICE VISIT (OUTPATIENT)
Age: 89
End: 2024-11-14

## 2024-11-14 VITALS
DIASTOLIC BLOOD PRESSURE: 81 MMHG | OXYGEN SATURATION: 95 % | SYSTOLIC BLOOD PRESSURE: 194 MMHG | BODY MASS INDEX: 40.72 KG/M2 | HEART RATE: 68 BPM | WEIGHT: 260 LBS | TEMPERATURE: 98.2 F

## 2024-11-14 DIAGNOSIS — J40 BRONCHITIS: Primary | ICD-10-CM

## 2024-11-14 DIAGNOSIS — R05.1 ACUTE COUGH: ICD-10-CM

## 2024-11-14 LAB
INFLUENZA A ANTIGEN, POC: NORMAL
INFLUENZA B ANTIGEN, POC: NORMAL

## 2024-11-14 RX ORDER — DOXYCYCLINE HYCLATE 100 MG
100 TABLET ORAL 2 TIMES DAILY
Qty: 14 TABLET | Refills: 0 | Status: SHIPPED | OUTPATIENT
Start: 2024-11-14 | End: 2024-11-21

## 2024-11-14 RX ORDER — FLUTICASONE PROPIONATE 50 MCG
1 SPRAY, SUSPENSION (ML) NASAL DAILY
Qty: 16 G | Refills: 0 | Status: SHIPPED | OUTPATIENT
Start: 2024-11-14

## 2024-11-14 NOTE — PROGRESS NOTES
use: Never    Sexual activity: Not Currently     Partners: Female   Other Topics Concern    Not on file   Social History Narrative    Not on file     Social Determinants of Health     Financial Resource Strain: Low Risk  (6/17/2024)    Overall Financial Resource Strain (CARDIA)     Difficulty of Paying Living Expenses: Not hard at all   Food Insecurity: No Food Insecurity (6/17/2024)    Hunger Vital Sign     Worried About Running Out of Food in the Last Year: Never true     Ran Out of Food in the Last Year: Never true   Transportation Needs: Unknown (6/17/2024)    PRAPARE - Transportation     Lack of Transportation (Medical): Not on file     Lack of Transportation (Non-Medical): No   Physical Activity: Inactive (8/20/2024)    Exercise Vital Sign     Days of Exercise per Week: 0 days     Minutes of Exercise per Session: 0 min   Stress: Not on file   Social Connections: Not on file   Intimate Partner Violence: Not on file   Housing Stability: Unknown (6/17/2024)    Housing Stability Vital Sign     Unable to Pay for Housing in the Last Year: Not on file     Number of Places Lived in the Last Year: Not on file     Unstable Housing in the Last Year: No       Allergies: Patient  Allergies   Allergen Reactions    Iodine Other (See Comments)     \"it gives me a headache\"    Hydralazine Headaches         OBJECTIVE:   Blood pressure (!) 194/81, pulse 68, temperature 98.2 °F (36.8 °C), weight 117.9 kg (260 lb), SpO2 95%.    All Vitals reviewed by myself    Physical Exam  Vitals and nursing note reviewed.   Constitutional:       General: He is not in acute distress.     Appearance: Normal appearance. He is not ill-appearing, toxic-appearing or diaphoretic.   HENT:      Right Ear: Ear canal and external ear normal.      Left Ear: Ear canal and external ear normal.      Nose: Congestion present. No rhinorrhea.      Mouth/Throat:      Mouth: Mucous membranes are moist.      Pharynx: Oropharynx is clear. Posterior oropharyngeal

## 2024-11-22 ENCOUNTER — OFFICE VISIT (OUTPATIENT)
Age: 89
End: 2024-11-22
Payer: MEDICARE

## 2024-11-22 VITALS
OXYGEN SATURATION: 97 % | BODY MASS INDEX: 40.74 KG/M2 | WEIGHT: 259.6 LBS | HEIGHT: 67 IN | DIASTOLIC BLOOD PRESSURE: 74 MMHG | TEMPERATURE: 97.8 F | SYSTOLIC BLOOD PRESSURE: 177 MMHG | HEART RATE: 70 BPM | RESPIRATION RATE: 18 BRPM

## 2024-11-22 DIAGNOSIS — I10 ESSENTIAL HYPERTENSION: ICD-10-CM

## 2024-11-22 DIAGNOSIS — J18.9 COMMUNITY ACQUIRED PNEUMONIA, UNSPECIFIED LATERALITY: Primary | ICD-10-CM

## 2024-11-22 PROCEDURE — 1123F ACP DISCUSS/DSCN MKR DOCD: CPT | Performed by: FAMILY MEDICINE

## 2024-11-22 PROCEDURE — G8484 FLU IMMUNIZE NO ADMIN: HCPCS | Performed by: FAMILY MEDICINE

## 2024-11-22 PROCEDURE — 1126F AMNT PAIN NOTED NONE PRSNT: CPT | Performed by: FAMILY MEDICINE

## 2024-11-22 PROCEDURE — 1160F RVW MEDS BY RX/DR IN RCRD: CPT | Performed by: FAMILY MEDICINE

## 2024-11-22 PROCEDURE — G8427 DOCREV CUR MEDS BY ELIG CLIN: HCPCS | Performed by: FAMILY MEDICINE

## 2024-11-22 PROCEDURE — 99214 OFFICE O/P EST MOD 30 MIN: CPT | Performed by: FAMILY MEDICINE

## 2024-11-22 PROCEDURE — 1036F TOBACCO NON-USER: CPT | Performed by: FAMILY MEDICINE

## 2024-11-22 PROCEDURE — G8417 CALC BMI ABV UP PARAM F/U: HCPCS | Performed by: FAMILY MEDICINE

## 2024-11-22 PROCEDURE — 1159F MED LIST DOCD IN RCRD: CPT | Performed by: FAMILY MEDICINE

## 2024-11-22 RX ORDER — AMLODIPINE BESYLATE 5 MG/1
7.5 TABLET ORAL DAILY
Qty: 135 TABLET | Refills: 1 | Status: SHIPPED | OUTPATIENT
Start: 2024-11-22 | End: 2025-05-21

## 2024-11-22 RX ORDER — CANDESARTAN 32 MG/1
1 TABLET ORAL
COMMUNITY

## 2024-11-22 RX ORDER — GUAIFENESIN 600 MG/1
600 TABLET, EXTENDED RELEASE ORAL 2 TIMES DAILY
Qty: 30 TABLET | Refills: 0 | Status: SHIPPED | OUTPATIENT
Start: 2024-11-22 | End: 2024-12-07

## 2024-11-22 SDOH — ECONOMIC STABILITY: INCOME INSECURITY: HOW HARD IS IT FOR YOU TO PAY FOR THE VERY BASICS LIKE FOOD, HOUSING, MEDICAL CARE, AND HEATING?: NOT HARD AT ALL

## 2024-11-22 SDOH — ECONOMIC STABILITY: FOOD INSECURITY: WITHIN THE PAST 12 MONTHS, YOU WORRIED THAT YOUR FOOD WOULD RUN OUT BEFORE YOU GOT MONEY TO BUY MORE.: NEVER TRUE

## 2024-11-22 SDOH — ECONOMIC STABILITY: FOOD INSECURITY: WITHIN THE PAST 12 MONTHS, THE FOOD YOU BOUGHT JUST DIDN'T LAST AND YOU DIDN'T HAVE MONEY TO GET MORE.: NEVER TRUE

## 2024-11-22 NOTE — PROGRESS NOTES
Maurice Allison (:  10/30/1930) is a 89 y.o. male,Established patient, here for evaluation of the following chief complaint(s):  Diabetes and Follow-up (2024/[facility] - Bronchitis///10/29/2024/[facility] - COVID//)      Assessment & Plan   ASSESSMENT/PLAN:  Assessment & Plan  1. Community acquired PNA, new acute, in the process of completing po abx. Clinically without hypoxia or abnl lung sounds.  Repeat CXR due to persistence of sx. Will treat with mucinex prn. Deferred inhaler due to lack of wheezing.  Pt with concern for allergy/intolerance/medication interaction with delsym so deferred dextromethorphan medications.   He was diagnosed with Covid on  and was treated with Paxlovid. He later developed persistent bronchitis symptoms, including congestion, cough, chills, mild diarrhea, and shortness of breath. A chest x-ray indicated possible early pneumonia, and he was prescribed doxycycline 100 mg twice a day, which he completed today. He still has a cough with yellow and clear sputum and some shortness of breath. A follow-up chest x-ray is scheduled for 2024. If shortness of breath worsens, he should go to the emergency room for a possible CT scan. His oxygen level is currently 97%. A prescription for medication to manage mucus will be provided.    2. Hypertension. Chronic, at goal per cardiology. Cont current medications.  Co managed with specialists.   He is currently on candesartan 32 mg once a day, carvedilol 3.125 mg twice a day, amlodipine 1-1/2 tablets a day, and furosemide for fluid and blood pressure management. His cardiologist has adjusted his medications, and no further changes are needed. He is not checking his blood pressure at home, but his blood pressure readings at the cardiologist's office are satisfactory.        1. Community acquired pneumonia, unspecified laterality  -     XR CHEST (2 VIEWS); Future  -

## 2024-11-22 NOTE — PROGRESS NOTES
RM : 02    Chief Complaint   Patient presents with    Diabetes    Follow-up     11/14/2024  [facility] - Bronchitis      10/29/2024  [facility] - COVID       Non Fasting     Vitals:    11/22/24 1046   BP: (!) 177/74   Pulse: 70   Resp: 18   Temp: 97.8 °F (36.6 °C)   SpO2: 97%         \"Have you been to the ER, urgent care clinic since your last visit?  Hospitalized since your last visit?\"    10/29/2024  [facility]    11/14/2024  [facility]  “Have you seen or consulted any other health care providers outside of [facility] since your last visit?”    Oncology   Cardiology             Click Here for Release of Records Request      AVS  education, follow up, and recommendations provided and addressed with patient.

## 2024-11-22 NOTE — PATIENT INSTRUCTIONS
Keep up the routinely scheduled appts with the cardiologist.   Have the x ray of the chest done around 12/5 since you are recently completing your antibiotics.  You can have it done sooner depending on your symptoms.   Seek emergency attention if you have any worsening of your cough, congestion, shortness of breath, etc.   Try the medication as needed to help with the congestion.

## 2024-12-02 ENCOUNTER — HOSPITAL ENCOUNTER (OUTPATIENT)
Age: 88
Discharge: HOME | End: 2024-12-05
Payer: MEDICARE

## 2024-12-02 DIAGNOSIS — J18.9 COMMUNITY ACQUIRED PNEUMONIA, UNSPECIFIED LATERALITY: ICD-10-CM

## 2024-12-02 PROCEDURE — 71046 X-RAY EXAM CHEST 2 VIEWS: CPT

## 2024-12-03 ENCOUNTER — TELEPHONE (OUTPATIENT)
Age: 88
End: 2024-12-03

## 2024-12-03 NOTE — RESULT ENCOUNTER NOTE
Call: the x ray of the chest does not show any pneumonia, fluid, or other acute findings.  You can improve some of the findings in the lower lung bases with incentive spirometry or taking deep breaths to help inflate the lower lungs.  Otherwise, return if you do not improve or have any concerning symptoms.  Hope you had a great thanksgiving holiday. Have a great end of the year and upcoming [place] holiday.

## 2024-12-03 NOTE — TELEPHONE ENCOUNTER
----- Message from Dr. Cinthya Nation MD sent at 12/3/2024 12:31 PM EST -----  Call: the x ray of the chest does not show any pneumonia, fluid, or other acute findings.  You can improve some of the findings in the lower lung bases with incentive spirometry or taking deep breaths to help inflate the lower lungs.  Otherwise, return if you do not improve or have any concerning symptoms.  Hope you had a great thanksgiving holiday. Have a great end of the year and upcoming [place] holiday.

## 2024-12-03 NOTE — TELEPHONE ENCOUNTER
I spoke to the patient and went over his most recent labs. Patient voiced an understanding and has no further questions pertaining to his self.

## 2025-01-23 DIAGNOSIS — E78.5 HYPERLIPIDEMIA LDL GOAL <70: ICD-10-CM

## 2025-01-23 RX ORDER — PRAVASTATIN SODIUM 20 MG
20 TABLET ORAL NIGHTLY
Qty: 90 TABLET | Refills: 3 | Status: SHIPPED | OUTPATIENT
Start: 2025-01-23 | End: 2026-01-23

## 2025-01-23 NOTE — TELEPHONE ENCOUNTER
Last appt: 11/22/2024  Next appt: 02/26/2025  Last refill: 05/22/2024  for # of tabs 90  with # of refills 3  Last known prescriber:   Cinthya Nation MD  Last labs: 05/24/2024  Requested prescription: pravastatin (PRAVACHOL) 20 MG tablet   Pharmacy: [facility] [address] [place] [address] [facility] - P [phone] - F [phone]  Best callback number: [phone]

## 2025-01-23 NOTE — TELEPHONE ENCOUNTER
Pt left a voice message requesting a refill.     [phone]    Duplicate request:   05/22/2024 Pravachol 20 mg #90 with 3 refills was sent to [facility] #[id number].     For Pharmacy Admin Tracking Only    Program: Medication Refill  Intervention Detail: Discontinued Rx: 1, reason: Duplicate Therapy  Time Spent (min): 5    Requested Prescriptions     Pending Prescriptions Disp Refills    pravastatin (PRAVACHOL) 20 MG tablet 90 tablet 0     Sig: Take 1 tablet by mouth at bedtime

## 2025-01-23 NOTE — TELEPHONE ENCOUNTER
Patient is requesting a bridge of refill for Pravastatin 20 mg as soon as possible. Patient next visit is schedule for 2/26/2025

## 2025-02-26 ENCOUNTER — OFFICE VISIT (OUTPATIENT)
Age: 89
End: 2025-02-26
Payer: MEDICARE

## 2025-02-26 VITALS
HEIGHT: 67 IN | SYSTOLIC BLOOD PRESSURE: 131 MMHG | WEIGHT: 266 LBS | DIASTOLIC BLOOD PRESSURE: 74 MMHG | RESPIRATION RATE: 16 BRPM | BODY MASS INDEX: 41.75 KG/M2 | TEMPERATURE: 97.7 F | OXYGEN SATURATION: 93 % | HEART RATE: 68 BPM

## 2025-02-26 DIAGNOSIS — I50.42 CHRONIC COMBINED SYSTOLIC AND DIASTOLIC HEART FAILURE (HCC): ICD-10-CM

## 2025-02-26 DIAGNOSIS — K21.9 GASTROESOPHAGEAL REFLUX DISEASE, UNSPECIFIED WHETHER ESOPHAGITIS PRESENT: ICD-10-CM

## 2025-02-26 DIAGNOSIS — E78.5 HYPERLIPIDEMIA LDL GOAL <70: ICD-10-CM

## 2025-02-26 DIAGNOSIS — Z87.01 HISTORY OF PNEUMONIA: ICD-10-CM

## 2025-02-26 DIAGNOSIS — E11.9 TYPE 2 DIABETES MELLITUS WITHOUT COMPLICATION, WITHOUT LONG-TERM CURRENT USE OF INSULIN (HCC): Primary | ICD-10-CM

## 2025-02-26 DIAGNOSIS — E66.813 OBESITY, CLASS 3: ICD-10-CM

## 2025-02-26 DIAGNOSIS — I10 ESSENTIAL HYPERTENSION: ICD-10-CM

## 2025-02-26 DIAGNOSIS — N18.31 CKD STAGE 3A, GFR 45-59 ML/MIN (HCC): ICD-10-CM

## 2025-02-26 PROBLEM — E11.22 TYPE 2 DIABETES MELLITUS WITH CHRONIC KIDNEY DISEASE (HCC): Status: ACTIVE | Noted: 2025-02-26

## 2025-02-26 PROCEDURE — 99213 OFFICE O/P EST LOW 20 MIN: CPT | Performed by: FAMILY MEDICINE

## 2025-02-26 PROCEDURE — 1159F MED LIST DOCD IN RCRD: CPT | Performed by: FAMILY MEDICINE

## 2025-02-26 PROCEDURE — 1123F ACP DISCUSS/DSCN MKR DOCD: CPT | Performed by: FAMILY MEDICINE

## 2025-02-26 PROCEDURE — 1036F TOBACCO NON-USER: CPT | Performed by: FAMILY MEDICINE

## 2025-02-26 PROCEDURE — G8417 CALC BMI ABV UP PARAM F/U: HCPCS | Performed by: FAMILY MEDICINE

## 2025-02-26 PROCEDURE — G8427 DOCREV CUR MEDS BY ELIG CLIN: HCPCS | Performed by: FAMILY MEDICINE

## 2025-02-26 PROCEDURE — 1160F RVW MEDS BY RX/DR IN RCRD: CPT | Performed by: FAMILY MEDICINE

## 2025-02-26 PROCEDURE — 1126F AMNT PAIN NOTED NONE PRSNT: CPT | Performed by: FAMILY MEDICINE

## 2025-02-26 SDOH — ECONOMIC STABILITY: FOOD INSECURITY: WITHIN THE PAST 12 MONTHS, YOU WORRIED THAT YOUR FOOD WOULD RUN OUT BEFORE YOU GOT MONEY TO BUY MORE.: NEVER TRUE

## 2025-02-26 SDOH — ECONOMIC STABILITY: FOOD INSECURITY: WITHIN THE PAST 12 MONTHS, THE FOOD YOU BOUGHT JUST DIDN'T LAST AND YOU DIDN'T HAVE MONEY TO GET MORE.: NEVER TRUE

## 2025-02-26 ASSESSMENT — PATIENT HEALTH QUESTIONNAIRE - PHQ9
SUM OF ALL RESPONSES TO PHQ QUESTIONS 1-9: 0
SUM OF ALL RESPONSES TO PHQ9 QUESTIONS 1 & 2: 0
1. LITTLE INTEREST OR PLEASURE IN DOING THINGS: NOT AT ALL
2. FEELING DOWN, DEPRESSED OR HOPELESS: NOT AT ALL
SUM OF ALL RESPONSES TO PHQ QUESTIONS 1-9: 0

## 2025-02-26 NOTE — PATIENT INSTRUCTIONS
You can try the purewick catheter to help your wife with the urine incontinence.    Keep up the great work! Continue your current medications

## 2025-02-26 NOTE — PROGRESS NOTES
Maurice Allison (:  10/30/1930) is a 89 y.o. male,Established patient, here for evaluation of the following chief complaint(s):  Follow-up      Assessment & Plan   ASSESSMENT/PLAN:  Assessment & Plan  1. Diabetes Mellitus, type 2, diet controlled without known complication.   Chronic, stable, at goal.  His diabetes appears to be well-managed through dietary control. He reports no symptoms such as high or low blood sugar, blurry vision, numbness, tingling, or weakness. An A1c test will be conducted today to monitor his diabetes.    2. Chronic Kidney Disease, stage 3a. Chronic, stable, cont routine surveillance.   He has a history of chronic kidney disease. Kidney function tests will be performed today to assess the current status of his condition.      1. Type 2 diabetes mellitus without complication, without long-term current use of insulin (HCC) - diet controlled, chronic, stable, at goal. Cont current management.  On ARB, ASA, and statin.   -     TSH  -     Microalbumin / Creatinine Urine Ratio  -     Lipid Panel  -     Hemoglobin A1C  -     Comprehensive Metabolic Panel  2. CKD stage 3a, GFR 45-59 ml/min (HCC) - chronic, stable, at goal. Cont routine surveillance.   -     Basic Metabolic Panel; Future  3. Chronic combined systolic and diastolic heart failure (HCC)  Assessment & Plan:   Monitored by specialist- no acute findings meriting change in the plan  Orders:  -     CBC with Auto Differential  4. History of pneumonia - acute, resolved, uncomplicated.    5. Obesity, class 3 (E66.813)  6. Body mass index [BMI] 40.0-44.9, adult (Z68.41)  7. Essential hypertension - chronic, stable, at goal. Cont current medications and co management with cardiology.   -     TSH  -     Microalbumin / Creatinine Urine Ratio  -     Lipid Panel  -     Comprehensive Metabolic Panel  -     CBC with Auto Differential  8. Hyperlipidemia LDL goal <70 - chronic, stable, at goal. Cont current medications.   -     TSH  -     Lipid

## 2025-02-26 NOTE — PROGRESS NOTES
Chief Complaint   Patient presents with    Follow-up     /74   Pulse 68   Temp 97.7 °F (36.5 °C)   Resp 16   Ht 1.702 m (5' 7\")   Wt 120.7 kg (266 lb)   SpO2 93%   BMI 41.66 kg/m²   \"Have you been to the ER, urgent care clinic since your last visit?  Hospitalized since your last visit?\"    NO    “Have you seen or consulted any other health care providers outside our system since your last visit?”    NO

## 2025-02-27 LAB
ALBUMIN SERPL-MCNC: 3.8 G/DL (ref 3.5–5)
ALBUMIN/GLOB SERPL: 1.3 (ref 1.1–2.2)
ALP SERPL-CCNC: 84 U/L (ref 45–117)
ALT SERPL-CCNC: 17 U/L (ref 12–78)
ANION GAP SERPL CALC-SCNC: 6 MMOL/L (ref 2–12)
AST SERPL-CCNC: 15 U/L (ref 15–37)
BASOPHILS # BLD: 0.01 K/UL (ref 0–0.1)
BASOPHILS NFR BLD: 0.3 % (ref 0–1)
BILIRUB SERPL-MCNC: 0.5 MG/DL (ref 0.2–1)
BUN SERPL-MCNC: 34 MG/DL (ref 6–20)
BUN/CREAT SERPL: 19 (ref 12–20)
CALCIUM SERPL-MCNC: 9.7 MG/DL (ref 8.5–10.1)
CHLORIDE SERPL-SCNC: 110 MMOL/L (ref 97–108)
CHOLEST SERPL-MCNC: 149 MG/DL
CO2 SERPL-SCNC: 27 MMOL/L (ref 21–32)
CREAT SERPL-MCNC: 1.75 MG/DL (ref 0.7–1.3)
CREAT UR-MCNC: 167 MG/DL
DIFFERENTIAL METHOD BLD: ABNORMAL
EOSINOPHIL # BLD: 0.19 K/UL (ref 0–0.4)
EOSINOPHIL NFR BLD: 5.9 % (ref 0–7)
ERYTHROCYTE [DISTWIDTH] IN BLOOD BY AUTOMATED COUNT: 15.3 % (ref 11.5–14.5)
EST. AVERAGE GLUCOSE BLD GHB EST-MCNC: 126 MG/DL
GLOBULIN SER CALC-MCNC: 2.9 G/DL (ref 2–4)
GLUCOSE SERPL-MCNC: 96 MG/DL (ref 65–100)
HBA1C MFR BLD: 6 % (ref 4–5.6)
HCT VFR BLD AUTO: 41.4 % (ref 36.6–50.3)
HDLC SERPL-MCNC: 44 MG/DL
HDLC SERPL: 3.4 (ref 0–5)
HGB BLD-MCNC: 12.8 G/DL (ref 12.1–17)
IMM GRANULOCYTES # BLD AUTO: 0.01 K/UL (ref 0–0.04)
IMM GRANULOCYTES NFR BLD AUTO: 0.3 % (ref 0–0.5)
LDLC SERPL CALC-MCNC: 91.4 MG/DL (ref 0–100)
LYMPHOCYTES # BLD: 1.14 K/UL (ref 0.8–3.5)
LYMPHOCYTES NFR BLD: 35.2 % (ref 12–49)
MCH RBC QN AUTO: 27.5 PG (ref 26–34)
MCHC RBC AUTO-ENTMCNC: 30.9 G/DL (ref 30–36.5)
MCV RBC AUTO: 88.8 FL (ref 80–99)
MICROALBUMIN UR-MCNC: 16.3 MG/DL
MICROALBUMIN/CREAT UR-RTO: 98 MG/G (ref 0–30)
MONOCYTES # BLD: 0.39 K/UL (ref 0–1)
MONOCYTES NFR BLD: 12 % (ref 5–13)
NEUTS SEG # BLD: 1.5 K/UL (ref 1.8–8)
NEUTS SEG NFR BLD: 46.3 % (ref 32–75)
NRBC # BLD: 0 K/UL (ref 0–0.01)
NRBC BLD-RTO: 0 PER 100 WBC
PLATELET # BLD AUTO: 173 K/UL (ref 150–400)
PMV BLD AUTO: 11.7 FL (ref 8.9–12.9)
POTASSIUM SERPL-SCNC: 4.3 MMOL/L (ref 3.5–5.1)
PROT SERPL-MCNC: 6.7 G/DL (ref 6.4–8.2)
RBC # BLD AUTO: 4.66 M/UL (ref 4.1–5.7)
SODIUM SERPL-SCNC: 143 MMOL/L (ref 136–145)
TRIGL SERPL-MCNC: 68 MG/DL
TSH SERPL DL<=0.05 MIU/L-ACNC: 1.52 UIU/ML (ref 0.36–3.74)
VLDLC SERPL CALC-MCNC: 13.6 MG/DL
WBC # BLD AUTO: 3.2 K/UL (ref 4.1–11.1)

## 2025-02-28 DIAGNOSIS — D70.8 OTHER NEUTROPENIA: ICD-10-CM

## 2025-02-28 DIAGNOSIS — N18.32 STAGE 3B CHRONIC KIDNEY DISEASE (CKD) (HCC): Primary | ICD-10-CM

## 2025-02-28 PROBLEM — E11.9 TYPE 2 DIABETES MELLITUS WITHOUT COMPLICATION, WITHOUT LONG-TERM CURRENT USE OF INSULIN (HCC): Status: ACTIVE | Noted: 2025-02-26

## 2025-02-28 NOTE — RESULT ENCOUNTER NOTE
Call:  all of your labs are within acceptable limits except:  1. The A1C is at goal of less than 7 at 6.0.  keep up the great work!   2.  Your chronic kidney disease is currently at stage 3b with a filtration of 36.  This is likely causing protein in the urine.  You can follow up with nephrology.    3.  The white blood cell count is slightly low.  This can be repeated in 3 mo.  The kidney function may also be repeated at this time as well.   Keep your routinely scheduled appts. Have a great upcoming weekend.

## 2025-03-17 ENCOUNTER — HOSPITAL ENCOUNTER (OUTPATIENT)
Age: 89
Discharge: HOME | End: 2025-03-20
Payer: MEDICARE

## 2025-03-17 ENCOUNTER — OFFICE VISIT (OUTPATIENT)
Age: 89
End: 2025-03-17
Payer: MEDICARE

## 2025-03-17 VITALS
WEIGHT: 270.8 LBS | HEART RATE: 59 BPM | RESPIRATION RATE: 18 BRPM | TEMPERATURE: 98.8 F | SYSTOLIC BLOOD PRESSURE: 151 MMHG | BODY MASS INDEX: 42.5 KG/M2 | DIASTOLIC BLOOD PRESSURE: 70 MMHG | OXYGEN SATURATION: 96 % | HEIGHT: 67 IN

## 2025-03-17 DIAGNOSIS — J40 BRONCHITIS: ICD-10-CM

## 2025-03-17 DIAGNOSIS — R06.02 SHORTNESS OF BREATH: ICD-10-CM

## 2025-03-17 DIAGNOSIS — I50.43 ACUTE ON CHRONIC COMBINED SYSTOLIC AND DIASTOLIC CONGESTIVE HEART FAILURE (HCC): ICD-10-CM

## 2025-03-17 DIAGNOSIS — I10 ESSENTIAL HYPERTENSION: Primary | ICD-10-CM

## 2025-03-17 PROCEDURE — G8417 CALC BMI ABV UP PARAM F/U: HCPCS | Performed by: FAMILY MEDICINE

## 2025-03-17 PROCEDURE — 1159F MED LIST DOCD IN RCRD: CPT | Performed by: FAMILY MEDICINE

## 2025-03-17 PROCEDURE — 1126F AMNT PAIN NOTED NONE PRSNT: CPT | Performed by: FAMILY MEDICINE

## 2025-03-17 PROCEDURE — 1036F TOBACCO NON-USER: CPT | Performed by: FAMILY MEDICINE

## 2025-03-17 PROCEDURE — 99215 OFFICE O/P EST HI 40 MIN: CPT | Performed by: FAMILY MEDICINE

## 2025-03-17 PROCEDURE — 1123F ACP DISCUSS/DSCN MKR DOCD: CPT | Performed by: FAMILY MEDICINE

## 2025-03-17 PROCEDURE — 71046 X-RAY EXAM CHEST 2 VIEWS: CPT

## 2025-03-17 PROCEDURE — G8427 DOCREV CUR MEDS BY ELIG CLIN: HCPCS | Performed by: FAMILY MEDICINE

## 2025-03-17 PROCEDURE — 1160F RVW MEDS BY RX/DR IN RCRD: CPT | Performed by: FAMILY MEDICINE

## 2025-03-17 RX ORDER — METHYLPREDNISOLONE 4 MG/1
TABLET ORAL
Qty: 21 TABLET | Refills: 0 | Status: SHIPPED | OUTPATIENT
Start: 2025-03-17 | End: 2025-03-23

## 2025-03-17 RX ORDER — ALBUTEROL SULFATE 90 UG/1
2 INHALANT RESPIRATORY (INHALATION) 4 TIMES DAILY PRN
Qty: 18 G | Refills: 0 | Status: SHIPPED | OUTPATIENT
Start: 2025-03-17

## 2025-03-17 SDOH — ECONOMIC STABILITY: FOOD INSECURITY: WITHIN THE PAST 12 MONTHS, THE FOOD YOU BOUGHT JUST DIDN'T LAST AND YOU DIDN'T HAVE MONEY TO GET MORE.: NEVER TRUE

## 2025-03-17 SDOH — ECONOMIC STABILITY: FOOD INSECURITY: WITHIN THE PAST 12 MONTHS, YOU WORRIED THAT YOUR FOOD WOULD RUN OUT BEFORE YOU GOT MONEY TO BUY MORE.: NEVER TRUE

## 2025-03-17 ASSESSMENT — PATIENT HEALTH QUESTIONNAIRE - PHQ9
1. LITTLE INTEREST OR PLEASURE IN DOING THINGS: NOT AT ALL
SUM OF ALL RESPONSES TO PHQ QUESTIONS 1-9: 0
SUM OF ALL RESPONSES TO PHQ QUESTIONS 1-9: 0
2. FEELING DOWN, DEPRESSED OR HOPELESS: NOT AT ALL
SUM OF ALL RESPONSES TO PHQ QUESTIONS 1-9: 0
SUM OF ALL RESPONSES TO PHQ QUESTIONS 1-9: 0

## 2025-03-17 NOTE — PROGRESS NOTES
RM:1  Chief Complaint   Patient presents with    Follow-up     Follow up for cardiology   - patients cardiologist has concerns about a mucous drip  Patient presents with wheezing / labored breathing         Vitals:    03/17/25 1551 03/17/25 1555   BP: (!) 146/89 (!) 151/70   BP Site: Left Upper Arm Right Upper Arm   Patient Position: Sitting Sitting   BP Cuff Size: Large Adult Large Adult   Pulse: 59    Resp: 18    Temp: 98.8 °F (37.1 °C)    TempSrc: Oral    SpO2: 96%    Weight: 122.8 kg (270 lb 12.8 oz)    Height: 1.702 m (5' 7\")         FASTING: No       \"Have you been to the ER, urgent care clinic since your last visit?  Hospitalized since your last visit?\"    NO    “Have you seen or consulted any other health care providers outside our system since your last visit?”    YES - When: approximately 2  weeks ago.  Where and Why: [facility] .

## 2025-03-17 NOTE — PROGRESS NOTES
Maurice Allison (:  10/30/1930) is a 89 y.o. male,Established patient, here for evaluation of the following chief complaint(s):  Follow-up (Follow up for cardiology /- patients cardiologist has concerns about a mucous drip/Patient presents with wheezing / labored breathing /)      Assessment & Plan   ASSESSMENT/PLAN:  Assessment & Plan  1. Bronchitis. New, acute, uncontrolled.  Will treat with steroids, albuterol, and await CXR of which results to determine abx if indicated or ER if with pulmonary edema.  He reports a cough producing clear sputum and difficulty breathing, which has led her to stop using her CPAP machine and sleep sitting up/PND. Wheezing was noted on examination. A chest x-ray will be performed stat to rule out infection. Steroid medication and an inhaler have been prescribed to help with the wheezing. If the chest x-ray shows an infection, an antibiotic will be prescribed.    2. Concern for acute on chronic combined systolic and diastolic HF.  New, acute, uncontrolled uncertain prognosis  Discussed with pt in addition to his daughter, Gill Forbes, who was available via telephone regarding concern for CHF exacerbation with the recommendation for ER evaluation today.  They state that he recently went to the cardiologist with neg EKG evaluation and advised to resume lasix which he is currently taking three times weekly at this time.  Reiterated with patient and daughter concern for CHF exacerbation particularly due to the LE edema, dyspnea, and PND.  He currently declines ER evaluation at this time.  Discussed strict ER warnings particularly if he is unresponsive to current therapy.    He is not currently monitoring his daily weights.  Follow up in 1 wk.   He is experiencing difficulty lying flat, fluid retention, and leg swelling. Despite taking Lasix 20 mg three times a week (Monday, Wednesday, Friday), he continues to have 1-2+ pitting edema on his left leg and 2+ pitting edema on his right

## 2025-03-17 NOTE — PATIENT INSTRUCTIONS
You can do the x ray of the chest as soon as possible.   Take the steroid medication and the inhaler.  The x ray of the chest will determine any antibiotics if there is an infection.

## 2025-03-18 ENCOUNTER — RESULTS FOLLOW-UP (OUTPATIENT)
Age: 89
End: 2025-03-18

## 2025-03-18 NOTE — RESULT ENCOUNTER NOTE
Call:  the x ray is negative for pneumonia and fluid.  Due to these findings, you do not need any antibiotics.  Make sure to take your fluid medication regularly, ideally every day to help with the fluid.  Seek emergency care if you do not improve with the medications.  Follow up in 1 wk. See you soon.

## 2025-03-24 ENCOUNTER — OFFICE VISIT (OUTPATIENT)
Age: 89
End: 2025-03-24
Payer: MEDICARE

## 2025-03-24 VITALS
BODY MASS INDEX: 41.96 KG/M2 | TEMPERATURE: 97.5 F | OXYGEN SATURATION: 95 % | HEART RATE: 60 BPM | WEIGHT: 267.3 LBS | SYSTOLIC BLOOD PRESSURE: 132 MMHG | HEIGHT: 67 IN | RESPIRATION RATE: 16 BRPM | DIASTOLIC BLOOD PRESSURE: 76 MMHG

## 2025-03-24 DIAGNOSIS — J40 BRONCHITIS: Primary | ICD-10-CM

## 2025-03-24 PROCEDURE — 1123F ACP DISCUSS/DSCN MKR DOCD: CPT | Performed by: FAMILY MEDICINE

## 2025-03-24 PROCEDURE — G8417 CALC BMI ABV UP PARAM F/U: HCPCS | Performed by: FAMILY MEDICINE

## 2025-03-24 PROCEDURE — 1126F AMNT PAIN NOTED NONE PRSNT: CPT | Performed by: FAMILY MEDICINE

## 2025-03-24 PROCEDURE — 1159F MED LIST DOCD IN RCRD: CPT | Performed by: FAMILY MEDICINE

## 2025-03-24 PROCEDURE — 99213 OFFICE O/P EST LOW 20 MIN: CPT | Performed by: FAMILY MEDICINE

## 2025-03-24 PROCEDURE — 1036F TOBACCO NON-USER: CPT | Performed by: FAMILY MEDICINE

## 2025-03-24 PROCEDURE — 1160F RVW MEDS BY RX/DR IN RCRD: CPT | Performed by: FAMILY MEDICINE

## 2025-03-24 PROCEDURE — G8427 DOCREV CUR MEDS BY ELIG CLIN: HCPCS | Performed by: FAMILY MEDICINE

## 2025-03-24 ASSESSMENT — PATIENT HEALTH QUESTIONNAIRE - PHQ9
SUM OF ALL RESPONSES TO PHQ QUESTIONS 1-9: 0
2. FEELING DOWN, DEPRESSED OR HOPELESS: NOT AT ALL
1. LITTLE INTEREST OR PLEASURE IN DOING THINGS: NOT AT ALL
SUM OF ALL RESPONSES TO PHQ QUESTIONS 1-9: 0

## 2025-03-24 ASSESSMENT — ENCOUNTER SYMPTOMS
SHORTNESS OF BREATH: 0
WHEEZING: 0
GASTROINTESTINAL NEGATIVE: 1
COUGH: 0

## 2025-03-24 NOTE — PROGRESS NOTES
Maurice Allison (:  10/30/1930) is a 89 y.o. male,Established patient, here for evaluation of the following chief complaint(s):  Follow-up (1 week follow up)      Assessment & Plan   ASSESSMENT/PLAN:  1. Bronchitis  New, acute, stable, resolving. Cont albuterol prn. Routine follow up or sooner if needed.     Return if symptoms worsen or fail to improve.               Subjective   SUBJECTIVE/OBJECTIVE:  Patient is a 89-year-old -American male with significant past medical history of coronary artery disease, congestive heart failure, obstructive sleep apnea on CPAP, chronic kidney disease stage III AA, hypertension, hyperlipidemia, and type 2 diabetes presenting for follow-up of bronchitis.  Reviewed with patient most recent chest x-ray which was negative for infiltrate/pneumonia and negative for pulmonary edema suggestive of congestive heart failure exacerbation.  Patient states he has taken his last dose of the oral steroid medication this morning and is continuing to use albuterol every 4 hours as needed.  He does note significant improvement in his symptoms and is now subsequently able to lay flat without any difficulty.  He also has been taking his Lasix regularly and his lower extremity edema is well-controlled at this time.  Patient currently denies any fever, chills, nausea, vomiting, dyspnea, wheezing, chest pain, dizziness, lightheadedness, lower extremity edema, cough, or wheezing.    Past Medical History:   Diagnosis Date    CAD (coronary artery disease) 2017    s/p CABG in  complicated by early occlusion of the LIMA lead to PCI of native LAD within first year of CABG.  LAD in stent restenosis and KAUSHAL to RCA Aug 2017. nonischemic EF 53% - [facility] Dr Sanchez    CKD stage 3a, GFR 45-59 ml/min (HCC)     Combined systolic and diastolic heart failure (HCC)     Diabetes (HCC)     Hyperlipidemia     Hypertension     Monoclonal gammopathy 2021    has hematology at [facility]

## 2025-03-24 NOTE — PROGRESS NOTES
RM:1    Chief Complaint   Patient presents with    Follow-up     1 week follow up       Vitals:    03/24/25 0910   BP: 132/76   BP Site: Left Upper Arm   Patient Position: Sitting   BP Cuff Size: Large Adult   Pulse: 60   Resp: 16   Temp: 97.5 °F (36.4 °C)   TempSrc: Oral   SpO2: 95%   Weight: 121.2 kg (267 lb 4.8 oz)   Height: 1.702 m (5' 7\")        FASTING: No    \"Have you been to the ER, urgent care clinic since your last visit?  Hospitalized since your last visit?\"    NO    “Have you seen or consulted any other health care providers outside of [facility] since your last visit?”    NO            Click Here for Release of Records Request

## 2025-04-01 ENCOUNTER — TELEPHONE (OUTPATIENT)
Age: 89
End: 2025-04-01

## 2025-04-01 NOTE — TELEPHONE ENCOUNTER
Gill Louisendon left a voice message requesting a new prescription for Carvedilol to be sent to the [facility] located on the corner of [facility] [facility] on behalf of the pt.     [phone]    Last appointment: 03/24/2025 MD Nation   Next appointment: 08/26/2025 MD Nation     For Pharmacy Admin Tracking Only    Program: Medication Refill  Intervention Detail: New Rx: 1, reason: Patient Preference  Time Spent (min): 5    Requested Prescriptions      No prescriptions requested or ordered in this encounter

## 2025-04-02 NOTE — TELEPHONE ENCOUNTER
Please call pt and advise that his blood pressure medications are refilled per the cardiologist since they primarily adjust his medication. If he can reach out to them for the refills and to confirm the appropriate dose.  Thanks.

## 2025-04-02 NOTE — TELEPHONE ENCOUNTER
Gill Alonzo  left a voice message following up on the request for the generic Carvedilol 6.25 mg on behalf of the pt.     [phone]

## 2025-04-04 NOTE — TELEPHONE ENCOUNTER
Called the patient to advise that per Dr. Nation he should be request his carvedilol with his cardiologist. Pt understood and states he has a upcoming appt and will let them know then.     - patient also states he concerned about fluid build up in his ankles. Pt request I tell Dr. Nation that usually after he eats his legs start getting tight and swelling and he has to sit down.

## 2025-04-07 NOTE — TELEPHONE ENCOUNTER
Please call pt and advise to check his weight daily. If he gains more than 5 lbs, then he can take one additional fluid pil. Otherwise, he can try to wear compression socks, elevate the legs, reduce salt, and that will also help.  Continue to watch for any worsening shortness of breath, chest pain, that would warrant emergency/urgent evaluation. Keep his routinely scheduled appts. Hope he has a great start to the week.

## 2025-04-09 DIAGNOSIS — R06.02 SHORTNESS OF BREATH: ICD-10-CM

## 2025-04-09 DIAGNOSIS — J40 BRONCHITIS: ICD-10-CM

## 2025-04-09 RX ORDER — ALBUTEROL SULFATE 90 UG/1
INHALANT RESPIRATORY (INHALATION)
Qty: 8.5 G | Refills: 1 | Status: SHIPPED | OUTPATIENT
Start: 2025-04-09

## 2025-04-09 NOTE — TELEPHONE ENCOUNTER
Future Appointments:  Future Appointments   Date Time Provider Department Center   8/26/2025  9:50 AM Cinthya Nation MD [facility]        Last Appointment With Me:  3/24/2025     Requested Prescriptions     Pending Prescriptions Disp Refills    albuterol sulfate HFA (PROVENTIL;VENTOLIN;PROAIR) 108 (90 Base) MCG/ACT inhaler [Pharmacy Med Name: ALBUTEROL HFA INH (200 PUFFS) 8.5GM] 8.5 g      Sig: INHALE 2 PUFFS BY MOUTH BY MOUTH INTO THE LUNGS FOUR TIMES DAILY AS NEEDED FOR WHEEZING

## 2025-05-30 ENCOUNTER — OFFICE VISIT (OUTPATIENT)
Age: 89
End: 2025-05-30
Payer: MEDICARE

## 2025-05-30 VITALS
BODY MASS INDEX: 42.41 KG/M2 | SYSTOLIC BLOOD PRESSURE: 144 MMHG | DIASTOLIC BLOOD PRESSURE: 71 MMHG | WEIGHT: 270.2 LBS | HEIGHT: 67 IN | RESPIRATION RATE: 20 BRPM | TEMPERATURE: 97.8 F | OXYGEN SATURATION: 93 % | HEART RATE: 60 BPM

## 2025-05-30 DIAGNOSIS — I50.42 CHRONIC COMBINED SYSTOLIC AND DIASTOLIC HEART FAILURE (HCC): ICD-10-CM

## 2025-05-30 DIAGNOSIS — N18.31 CKD STAGE 3A, GFR 45-59 ML/MIN (HCC): ICD-10-CM

## 2025-05-30 DIAGNOSIS — J45.20 MILD INTERMITTENT REACTIVE AIRWAY DISEASE WITHOUT COMPLICATION: Primary | ICD-10-CM

## 2025-05-30 PROCEDURE — G8417 CALC BMI ABV UP PARAM F/U: HCPCS | Performed by: FAMILY MEDICINE

## 2025-05-30 PROCEDURE — 99214 OFFICE O/P EST MOD 30 MIN: CPT | Performed by: FAMILY MEDICINE

## 2025-05-30 PROCEDURE — 1123F ACP DISCUSS/DSCN MKR DOCD: CPT | Performed by: FAMILY MEDICINE

## 2025-05-30 PROCEDURE — 1159F MED LIST DOCD IN RCRD: CPT | Performed by: FAMILY MEDICINE

## 2025-05-30 PROCEDURE — 1036F TOBACCO NON-USER: CPT | Performed by: FAMILY MEDICINE

## 2025-05-30 PROCEDURE — 1126F AMNT PAIN NOTED NONE PRSNT: CPT | Performed by: FAMILY MEDICINE

## 2025-05-30 PROCEDURE — G8427 DOCREV CUR MEDS BY ELIG CLIN: HCPCS | Performed by: FAMILY MEDICINE

## 2025-05-30 PROCEDURE — 1160F RVW MEDS BY RX/DR IN RCRD: CPT | Performed by: FAMILY MEDICINE

## 2025-05-30 RX ORDER — ALBUTEROL SULFATE 90 UG/1
2 INHALANT RESPIRATORY (INHALATION) EVERY 6 HOURS PRN
Qty: 8.5 G | Refills: 1 | Status: SHIPPED | OUTPATIENT
Start: 2025-05-30

## 2025-05-30 RX ORDER — METHYLPREDNISOLONE 4 MG/1
TABLET ORAL
Qty: 21 TABLET | Refills: 0 | Status: SHIPPED | OUTPATIENT
Start: 2025-05-30 | End: 2025-06-05

## 2025-05-30 RX ORDER — MONTELUKAST SODIUM 10 MG/1
10 TABLET ORAL NIGHTLY
Qty: 90 TABLET | Refills: 1 | Status: SHIPPED | OUTPATIENT
Start: 2025-05-30

## 2025-05-30 RX ORDER — FUROSEMIDE 20 MG/1
20 TABLET ORAL DAILY
Qty: 90 TABLET | Refills: 0 | Status: SHIPPED | OUTPATIENT
Start: 2025-05-30 | End: 2025-08-28

## 2025-05-30 SDOH — ECONOMIC STABILITY: FOOD INSECURITY: WITHIN THE PAST 12 MONTHS, YOU WORRIED THAT YOUR FOOD WOULD RUN OUT BEFORE YOU GOT MONEY TO BUY MORE.: NEVER TRUE

## 2025-05-30 SDOH — ECONOMIC STABILITY: FOOD INSECURITY: WITHIN THE PAST 12 MONTHS, THE FOOD YOU BOUGHT JUST DIDN'T LAST AND YOU DIDN'T HAVE MONEY TO GET MORE.: NEVER TRUE

## 2025-05-30 ASSESSMENT — PATIENT HEALTH QUESTIONNAIRE - PHQ9
SUM OF ALL RESPONSES TO PHQ QUESTIONS 1-9: 0
1. LITTLE INTEREST OR PLEASURE IN DOING THINGS: NOT AT ALL
2. FEELING DOWN, DEPRESSED OR HOPELESS: NOT AT ALL
SUM OF ALL RESPONSES TO PHQ QUESTIONS 1-9: 0

## 2025-05-30 NOTE — PATIENT INSTRUCTIONS
Continue taking the lasix daily.   Start the singulair in addition the zyrtec to help with the allergies and bronchitis.   Start the steroid medication as well.

## 2025-05-30 NOTE — PROGRESS NOTES
Chief Complaint   Patient presents with    Shortness of Breath     BP (!) 144/71   Pulse 60   Temp 97.8 °F (36.6 °C)   Resp 20   Ht 1.702 m (5' 7\")   Wt 122.6 kg (270 lb 3.2 oz)   SpO2 93%   BMI 42.32 kg/m²   Have you been to the ER, urgent care clinic since your last visit?  Hospitalized since your last visit?   NO    Have you seen or consulted any other health care providers outside our system since your last visit?   NO    Rm 2           
Years of education: Not on file    Highest education level: Not on file   Occupational History    Not on file   Tobacco Use    Smoking status: Never    Smokeless tobacco: Never   Substance and Sexual Activity    Alcohol use: No    Drug use: Never    Sexual activity: Not Currently     Partners: Female   Other Topics Concern    Not on file   Social History Narrative    Not on file     Social Drivers of Health     Financial Resource Strain: Low Risk  (11/22/2024)    Overall Financial Resource Strain (CARDIA)     Difficulty of Paying Living Expenses: Not hard at all   Food Insecurity: No Food Insecurity (5/30/2025)    Hunger Vital Sign     Worried About Running Out of Food in the Last Year: Never true     Ran Out of Food in the Last Year: Never true   Transportation Needs: No Transportation Needs (5/30/2025)    PRAPARE - Transportation     Lack of Transportation (Medical): No     Lack of Transportation (Non-Medical): No   Physical Activity: Inactive (8/20/2024)    Exercise Vital Sign     Days of Exercise per Week: 0 days     Minutes of Exercise per Session: 0 min   Stress: Not on file   Social Connections: Not on file   Intimate Partner Violence: Not on file   Housing Stability: Low Risk  (5/30/2025)    Housing Stability Vital Sign     Unable to Pay for Housing in the Last Year: No     Number of Times Moved in the Last Year: 0     Homeless in the Last Year: No       Current Outpatient Medications:     furosemide (LASIX) 20 MG tablet, Take 1 tablet by mouth daily 3 times a week, Disp: 90 tablet, Rfl: 0    methylPREDNISolone (MEDROL DOSEPACK) 4 MG tablet, Take by mouth., Disp: 21 tablet, Rfl: 0    montelukast (SINGULAIR) 10 MG tablet, Take 1 tablet by mouth nightly, Disp: 90 tablet, Rfl: 1    albuterol sulfate HFA (PROVENTIL;VENTOLIN;PROAIR) 108 (90 Base) MCG/ACT inhaler, Inhale 2 puffs into the lungs every 6 hours as needed for Wheezing, Disp: 8.5 g, Rfl: 1    pravastatin (PRAVACHOL) 20 MG tablet, Take 1 tablet by mouth

## 2025-05-31 LAB
ANION GAP SERPL CALC-SCNC: 4 MMOL/L (ref 2–12)
BUN SERPL-MCNC: 26 MG/DL (ref 6–20)
BUN/CREAT SERPL: 17 (ref 12–20)
CALCIUM SERPL-MCNC: 9.5 MG/DL (ref 8.5–10.1)
CHLORIDE SERPL-SCNC: 111 MMOL/L (ref 97–108)
CO2 SERPL-SCNC: 29 MMOL/L (ref 21–32)
CREAT SERPL-MCNC: 1.56 MG/DL (ref 0.7–1.3)
ERYTHROCYTE [DISTWIDTH] IN BLOOD BY AUTOMATED COUNT: 15.4 % (ref 11.5–14.5)
GLUCOSE SERPL-MCNC: 113 MG/DL (ref 65–100)
HCT VFR BLD AUTO: 41.7 % (ref 36.6–50.3)
HGB BLD-MCNC: 12.6 G/DL (ref 12.1–17)
MCH RBC QN AUTO: 27.8 PG (ref 26–34)
MCHC RBC AUTO-ENTMCNC: 30.2 G/DL (ref 30–36.5)
MCV RBC AUTO: 91.9 FL (ref 80–99)
NRBC # BLD: 0 K/UL (ref 0–0.01)
NRBC BLD-RTO: 0 PER 100 WBC
PLATELET # BLD AUTO: 161 K/UL (ref 150–400)
PMV BLD AUTO: 12.1 FL (ref 8.9–12.9)
POTASSIUM SERPL-SCNC: 4 MMOL/L (ref 3.5–5.1)
RBC # BLD AUTO: 4.54 M/UL (ref 4.1–5.7)
SODIUM SERPL-SCNC: 144 MMOL/L (ref 136–145)
WBC # BLD AUTO: 4.2 K/UL (ref 4.1–11.1)

## 2025-06-01 ENCOUNTER — RESULTS FOLLOW-UP (OUTPATIENT)
Age: 89
End: 2025-06-01

## 2025-06-02 NOTE — RESULT ENCOUNTER NOTE
Letter:  all of your labs are within acceptable limits.  Your kidney function is at your baseline with a normal potassium.  Continue your current medications. Continue with the recommendations as discussed at your appt.  Keep your routinely scheduled appts. Have a great week and start to the summer.

## 2025-07-22 ENCOUNTER — OFFICE VISIT (OUTPATIENT)
Age: 89
End: 2025-07-22
Payer: MEDICARE

## 2025-07-22 VITALS
SYSTOLIC BLOOD PRESSURE: 139 MMHG | WEIGHT: 264.6 LBS | DIASTOLIC BLOOD PRESSURE: 68 MMHG | BODY MASS INDEX: 41.53 KG/M2 | OXYGEN SATURATION: 97 % | RESPIRATION RATE: 18 BRPM | HEIGHT: 67 IN | HEART RATE: 51 BPM | TEMPERATURE: 97.5 F

## 2025-07-22 DIAGNOSIS — N18.31 CKD STAGE 3A, GFR 45-59 ML/MIN (HCC): ICD-10-CM

## 2025-07-22 DIAGNOSIS — E11.9 TYPE 2 DIABETES MELLITUS WITHOUT COMPLICATION, WITHOUT LONG-TERM CURRENT USE OF INSULIN (HCC): Primary | ICD-10-CM

## 2025-07-22 PROCEDURE — G8417 CALC BMI ABV UP PARAM F/U: HCPCS | Performed by: FAMILY MEDICINE

## 2025-07-22 PROCEDURE — 1036F TOBACCO NON-USER: CPT | Performed by: FAMILY MEDICINE

## 2025-07-22 PROCEDURE — 1123F ACP DISCUSS/DSCN MKR DOCD: CPT | Performed by: FAMILY MEDICINE

## 2025-07-22 PROCEDURE — G8427 DOCREV CUR MEDS BY ELIG CLIN: HCPCS | Performed by: FAMILY MEDICINE

## 2025-07-22 PROCEDURE — 99213 OFFICE O/P EST LOW 20 MIN: CPT | Performed by: FAMILY MEDICINE

## 2025-07-22 PROCEDURE — 1159F MED LIST DOCD IN RCRD: CPT | Performed by: FAMILY MEDICINE

## 2025-07-22 PROCEDURE — 3044F HG A1C LEVEL LT 7.0%: CPT | Performed by: FAMILY MEDICINE

## 2025-07-22 PROCEDURE — 1126F AMNT PAIN NOTED NONE PRSNT: CPT | Performed by: FAMILY MEDICINE

## 2025-07-22 PROCEDURE — 1160F RVW MEDS BY RX/DR IN RCRD: CPT | Performed by: FAMILY MEDICINE

## 2025-07-22 SDOH — ECONOMIC STABILITY: FOOD INSECURITY: WITHIN THE PAST 12 MONTHS, YOU WORRIED THAT YOUR FOOD WOULD RUN OUT BEFORE YOU GOT MONEY TO BUY MORE.: NEVER TRUE

## 2025-07-22 SDOH — ECONOMIC STABILITY: FOOD INSECURITY: WITHIN THE PAST 12 MONTHS, THE FOOD YOU BOUGHT JUST DIDN'T LAST AND YOU DIDN'T HAVE MONEY TO GET MORE.: NEVER TRUE

## 2025-07-22 ASSESSMENT — PATIENT HEALTH QUESTIONNAIRE - PHQ9
SUM OF ALL RESPONSES TO PHQ QUESTIONS 1-9: 0
SUM OF ALL RESPONSES TO PHQ QUESTIONS 1-9: 0
1. LITTLE INTEREST OR PLEASURE IN DOING THINGS: NOT AT ALL
SUM OF ALL RESPONSES TO PHQ QUESTIONS 1-9: 0
SUM OF ALL RESPONSES TO PHQ QUESTIONS 1-9: 0
2. FEELING DOWN, DEPRESSED OR HOPELESS: NOT AT ALL

## 2025-07-22 NOTE — PROGRESS NOTES
RM:1    Chief Complaint   Patient presents with    6 Month Follow-Up     For DM with fasting labs        Vitals:    07/22/25 0827 07/22/25 0841   BP: 139/68    BP Site: Right Upper Arm    Patient Position: Sitting    BP Cuff Size: Large Adult    Pulse: (!) 49 51   Resp: 18    Temp: 97.5 °F (36.4 °C)    TempSrc: Oral    SpO2: 97%    Weight: 120 kg (264 lb 9.6 oz)    Height: 1.702 m (5' 7\")         FASTING: No    \"Have you been to the ER, urgent care clinic since your last visit?  Hospitalized since your last visit?\"    NO    “Have you seen or consulted any other health care providers outside of [facility] since your last visit?”    NO            Click Here for Release of Records Request   
Disp: 60 tablet, Rfl: 3    docusate calcium (SURFAK) 240 MG capsule, Take by mouth (Patient not taking: Reported on 7/22/2025), Disp: , Rfl:     clopidogrel (PLAVIX) 75 MG tablet, Take 1 tablet by mouth daily (Patient not taking: Reported on 7/22/2025), Disp: , Rfl:   Allergies   Allergen Reactions    Iodine Other (See Comments)     \"it gives me a headache\"    Hydralazine Headaches            Objective   Vitals:    07/22/25 0827 07/22/25 0841   BP: 139/68    BP Site: Right Upper Arm    Patient Position: Sitting    BP Cuff Size: Large Adult    Pulse: (!) 49 51   Resp: 18    Temp: 97.5 °F (36.4 °C)    TempSrc: Oral    SpO2: 97%    Weight: 120 kg (264 lb 9.6 oz)    Height: 1.702 m (5' 7\")       Physical Exam  Clear to auscultation, no wheezing, rales or rhonchi  Regular rate and rhythm, no murmurs, rubs, or gallops  No edema or abnormalities noted in the lower extremities  Physical Exam  Vitals and nursing note reviewed.   Constitutional:       General: He is not in acute distress.     Appearance: Normal appearance. He is obese. He is not ill-appearing or toxic-appearing.   HENT:      Head: Normocephalic and atraumatic.   Neck:      Vascular: No carotid bruit.   Cardiovascular:      Rate and Rhythm: Normal rate and regular rhythm.      Pulses: Normal pulses.      Heart sounds: S1 normal and S2 normal. Murmur (murmur 2-3/6 TANNER) heard.      No friction rub. No gallop.      Comments: No abdominal bruit  Pulmonary:      Effort: Pulmonary effort is normal. No respiratory distress.      Breath sounds: Normal breath sounds. No wheezing, rhonchi or rales.   Abdominal:      General: Abdomen is flat. Bowel sounds are normal. There is no distension.      Palpations: Abdomen is soft.      Tenderness: There is no abdominal tenderness. There is no guarding or rebound.   Musculoskeletal:      Right lower leg: Edema (1+ pitting edema) present.      Left lower leg: Edema (1+ pittng edema) present.   Skin:     General: Skin is warm and

## 2025-07-23 LAB
EST. AVERAGE GLUCOSE BLD GHB EST-MCNC: 126 MG/DL
HBA1C MFR BLD: 6 % (ref 4–5.6)

## 2025-07-26 ENCOUNTER — RESULTS FOLLOW-UP (OUTPATIENT)
Age: 89
End: 2025-07-26

## 2025-07-27 NOTE — RESULT ENCOUNTER NOTE
Letter:  your A1C is stable and at goal.  Keep up the great work! Keep your routinely scheduled appts.  Have a great rest of the summer.